# Patient Record
Sex: MALE | Race: WHITE | Employment: FULL TIME | URBAN - METROPOLITAN AREA
[De-identification: names, ages, dates, MRNs, and addresses within clinical notes are randomized per-mention and may not be internally consistent; named-entity substitution may affect disease eponyms.]

---

## 2018-03-22 ENCOUNTER — OFFICE VISIT (OUTPATIENT)
Dept: FAMILY MEDICINE CLINIC | Age: 58
End: 2018-03-22
Payer: MEDICARE

## 2018-03-22 ENCOUNTER — HOSPITAL ENCOUNTER (OUTPATIENT)
Age: 58
Discharge: HOME | End: 2018-03-24
Payer: MEDICARE

## 2018-03-22 VITALS
SYSTOLIC BLOOD PRESSURE: 160 MMHG | TEMPERATURE: 98.7 F | WEIGHT: 186.8 LBS | BODY MASS INDEX: 33.1 KG/M2 | OXYGEN SATURATION: 97 % | HEIGHT: 63 IN | DIASTOLIC BLOOD PRESSURE: 100 MMHG | HEART RATE: 68 BPM

## 2018-03-22 DIAGNOSIS — Z11.4 ENCOUNTER FOR SCREENING FOR HIV: ICD-10-CM

## 2018-03-22 DIAGNOSIS — Z11.59 NEED FOR HEPATITIS C SCREENING TEST: ICD-10-CM

## 2018-03-22 DIAGNOSIS — E66.9 CLASS 2 OBESITY IN ADULT, UNSPECIFIED BMI, UNSPECIFIED OBESITY TYPE, UNSPECIFIED WHETHER SERIOUS COMORBIDITY PRESENT: ICD-10-CM

## 2018-03-22 DIAGNOSIS — R51.9 GENERALIZED HEADACHES: ICD-10-CM

## 2018-03-22 DIAGNOSIS — I10 ESSENTIAL HYPERTENSION: Primary | ICD-10-CM

## 2018-03-22 LAB — HBA1C MFR BLD: 5.5 % (ref 4.8–5.9)

## 2018-03-22 PROCEDURE — 36415 COLL VENOUS BLD VENIPUNCTURE: CPT | Performed by: COUNSELOR

## 2018-03-22 PROCEDURE — G8482 FLU IMMUNIZE ORDER/ADMIN: HCPCS | Performed by: FAMILY MEDICINE

## 2018-03-22 PROCEDURE — G8427 DOCREV CUR MEDS BY ELIG CLIN: HCPCS | Performed by: FAMILY MEDICINE

## 2018-03-22 PROCEDURE — 99213 OFFICE O/P EST LOW 20 MIN: CPT | Performed by: FAMILY MEDICINE

## 2018-03-22 PROCEDURE — 86803 HEPATITIS C AB TEST: CPT

## 2018-03-22 PROCEDURE — 83036 HEMOGLOBIN GLYCOSYLATED A1C: CPT

## 2018-03-22 PROCEDURE — 3017F COLORECTAL CA SCREEN DOC REV: CPT | Performed by: FAMILY MEDICINE

## 2018-03-22 PROCEDURE — G8417 CALC BMI ABV UP PARAM F/U: HCPCS | Performed by: FAMILY MEDICINE

## 2018-03-22 PROCEDURE — 1036F TOBACCO NON-USER: CPT | Performed by: FAMILY MEDICINE

## 2018-03-22 PROCEDURE — 99212 OFFICE O/P EST SF 10 MIN: CPT | Performed by: FAMILY MEDICINE

## 2018-03-22 PROCEDURE — 86703 HIV-1/HIV-2 1 RESULT ANTBDY: CPT

## 2018-03-22 RX ORDER — CARVEDILOL 25 MG/1
25 TABLET ORAL 2 TIMES DAILY
Qty: 60 TABLET | Refills: 1 | Status: SHIPPED | OUTPATIENT
Start: 2018-03-22 | End: 2018-07-20 | Stop reason: SDUPTHER

## 2018-03-22 ASSESSMENT — PATIENT HEALTH QUESTIONNAIRE - PHQ9
SUM OF ALL RESPONSES TO PHQ9 QUESTIONS 1 & 2: 0
SUM OF ALL RESPONSES TO PHQ QUESTIONS 1-9: 0
2. FEELING DOWN, DEPRESSED OR HOPELESS: 0
1. LITTLE INTEREST OR PLEASURE IN DOING THINGS: 0

## 2018-03-22 ASSESSMENT — ENCOUNTER SYMPTOMS
BACK PAIN: 0
VOMITING: 0
CONSTIPATION: 0
NAUSEA: 0
SHORTNESS OF BREATH: 0
ABDOMINAL PAIN: 0
BLURRED VISION: 0
DOUBLE VISION: 0
DIARRHEA: 0
SORE THROAT: 0
COUGH: 0

## 2018-03-22 NOTE — PATIENT INSTRUCTIONS
Patient Education        DASH Diet: Care Instructions  Your Care Instructions    The DASH diet is an eating plan that can help lower your blood pressure. DASH stands for Dietary Approaches to Stop Hypertension. Hypertension is high blood pressure. The DASH diet focuses on eating foods that are high in calcium, potassium, and magnesium. These nutrients can lower blood pressure. The foods that are highest in these nutrients are fruits, vegetables, low-fat dairy products, nuts, seeds, and legumes. But taking calcium, potassium, and magnesium supplements instead of eating foods that are high in those nutrients does not have the same effect. The DASH diet also includes whole grains, fish, and poultry. The DASH diet is one of several lifestyle changes your doctor may recommend to lower your high blood pressure. Your doctor may also want you to decrease the amount of sodium in your diet. Lowering sodium while following the DASH diet can lower blood pressure even further than just the DASH diet alone. Follow-up care is a key part of your treatment and safety. Be sure to make and go to all appointments, and call your doctor if you are having problems. It's also a good idea to know your test results and keep a list of the medicines you take. How can you care for yourself at home? Following the DASH diet  · Eat 4 to 5 servings of fruit each day. A serving is 1 medium-sized piece of fruit, ½ cup chopped or canned fruit, 1/4 cup dried fruit, or 4 ounces (½ cup) of fruit juice. Choose fruit more often than fruit juice. · Eat 4 to 5 servings of vegetables each day. A serving is 1 cup of lettuce or raw leafy vegetables, ½ cup of chopped or cooked vegetables, or 4 ounces (½ cup) of vegetable juice. Choose vegetables more often than vegetable juice. · Get 2 to 3 servings of low-fat and fat-free dairy each day. A serving is 8 ounces of milk, 1 cup of yogurt, or 1 ½ ounces of cheese. · Eat 6 to 8 servings of grains each day.  A using beans and peas. Add garbanzo or kidney beans to salads. Make burritos and tacos with mashed gonzalez beans or black beans. Where can you learn more? Go to https://Tapjoylillianeb.[email or web address]. org and sign in to your [employer] account. Enter S631 in the [place] box to learn more about \"DASH Diet: Care Instructions. \"     If you do not have an account, please click on the \"Sign Up Now\" link. Current as of: September 21, 2016  Content Version: 11.5  © 2068-7344 Healthwise, Incorporated. Care instructions adapted under license by [facility] ([facility]). If you have questions about a medical condition or this instruction, always ask your healthcare professional. [address] any warranty or liability for your use of this information.

## 2018-03-22 NOTE — PROGRESS NOTES
1960    HIV screen  08/14/1976    DTaP/Tdap/Td vaccine (1 - Tdap) 08/14/1980    Shingles Vaccine (1 of 2 - 2 Dose Series) 08/14/2011    A1C test (Diabetic or Prediabetic)  08/15/2017     Reviewed age and gender appropriate health screening exams and vaccinations. Advised patient regarding importance of keeping up with recommended health maintenance and to schedule as soon as possible if overdue, as this is important in assessing for undiagnosed pathology, especially cancer, as well as protecting against potentially harmful/life threatening disease. RTO in 1 week for BP and HA's. No future appointments.          Signed by : Ailyn Joseph M.D., [place] PGY-3    This case was discussed with (s) Katharine Singleton

## 2018-03-23 LAB
HEPATITIS C ANTIBODY INTERPRETATION: NORMAL
HIV-1 AND HIV-2 ANTIBODIES: NORMAL

## 2018-03-29 ENCOUNTER — APPOINTMENT (OUTPATIENT)
Dept: GENERAL RADIOLOGY | Age: 58
End: 2018-03-29
Payer: MEDICARE

## 2018-03-29 ENCOUNTER — HOSPITAL ENCOUNTER (OUTPATIENT)
Age: 58
Discharge: HOME | End: 2018-03-31
Payer: MEDICARE

## 2018-03-29 ENCOUNTER — APPOINTMENT (OUTPATIENT)
Dept: CT IMAGING | Age: 58
End: 2018-03-29
Payer: MEDICARE

## 2018-03-29 ENCOUNTER — NURSE ONLY (OUTPATIENT)
Dept: FAMILY MEDICINE CLINIC | Age: 58
End: 2018-03-29
Payer: MEDICARE

## 2018-03-29 ENCOUNTER — HOSPITAL ENCOUNTER (EMERGENCY)
Age: 58
Discharge: HOME | End: 2018-03-29
Attending: EMERGENCY MEDICINE
Payer: MEDICARE

## 2018-03-29 VITALS
BODY MASS INDEX: 32.96 KG/M2 | RESPIRATION RATE: 19 BRPM | WEIGHT: 186 LBS | HEART RATE: 60 BPM | HEIGHT: 63 IN | SYSTOLIC BLOOD PRESSURE: 176 MMHG | TEMPERATURE: 97.3 F | DIASTOLIC BLOOD PRESSURE: 97 MMHG | OXYGEN SATURATION: 98 %

## 2018-03-29 VITALS — DIASTOLIC BLOOD PRESSURE: 106 MMHG | SYSTOLIC BLOOD PRESSURE: 168 MMHG | HEART RATE: 64 BPM

## 2018-03-29 DIAGNOSIS — I10 ESSENTIAL HYPERTENSION: ICD-10-CM

## 2018-03-29 DIAGNOSIS — I15.9 SECONDARY HYPERTENSION: Primary | ICD-10-CM

## 2018-03-29 DIAGNOSIS — R51.9 NONINTRACTABLE HEADACHE, UNSPECIFIED CHRONICITY PATTERN, UNSPECIFIED HEADACHE TYPE: ICD-10-CM

## 2018-03-29 LAB
ALBUMIN SERPL-MCNC: 4.2 G/DL (ref 3.5–5.2)
ALP BLD-CCNC: 58 U/L (ref 40–129)
ALT SERPL-CCNC: 28 U/L (ref 0–40)
ANION GAP SERPL CALCULATED.3IONS-SCNC: 12 MMOL/L (ref 7–16)
AST SERPL-CCNC: 26 U/L (ref 0–39)
BASOPHILS ABSOLUTE: 0.06 E9/L (ref 0–0.2)
BASOPHILS RELATIVE PERCENT: 0.8 % (ref 0–2)
BILIRUB SERPL-MCNC: 0.4 MG/DL (ref 0–1.2)
BILIRUBIN URINE: NEGATIVE
BLOOD, URINE: NEGATIVE
BUN BLDV-MCNC: 18 MG/DL (ref 6–20)
CALCIUM SERPL-MCNC: 9.1 MG/DL (ref 8.6–10.2)
CHLORIDE BLD-SCNC: 104 MMOL/L (ref 98–107)
CLARITY: CLEAR
CO2: 26 MMOL/L (ref 22–29)
COLOR: YELLOW
CREAT SERPL-MCNC: 0.8 MG/DL (ref 0.7–1.2)
EOSINOPHILS ABSOLUTE: 0.65 E9/L (ref 0.05–0.5)
EOSINOPHILS RELATIVE PERCENT: 8.4 % (ref 0–6)
GFR AFRICAN AMERICAN: >60
GFR NON-AFRICAN AMERICAN: >60 ML/MIN/1.73
GLUCOSE BLD-MCNC: 94 MG/DL (ref 74–109)
GLUCOSE URINE: NEGATIVE MG/DL
HCT VFR BLD CALC: 41.2 % (ref 37–54)
HEMOGLOBIN: 14 G/DL (ref 12.5–16.5)
IMMATURE GRANULOCYTES #: 0.03 E9/L
IMMATURE GRANULOCYTES %: 0.4 % (ref 0–5)
KETONES, URINE: NEGATIVE MG/DL
LEUKOCYTE ESTERASE, URINE: NEGATIVE
LYMPHOCYTES ABSOLUTE: 1.83 E9/L (ref 1.5–4)
LYMPHOCYTES RELATIVE PERCENT: 23.7 % (ref 20–42)
MCH RBC QN AUTO: 28.2 PG (ref 26–35)
MCHC RBC AUTO-ENTMCNC: 34 % (ref 32–34.5)
MCV RBC AUTO: 83.1 FL (ref 80–99.9)
MONOCYTES ABSOLUTE: 0.65 E9/L (ref 0.1–0.95)
MONOCYTES RELATIVE PERCENT: 8.4 % (ref 2–12)
NEUTROPHILS ABSOLUTE: 4.5 E9/L (ref 1.8–7.3)
NEUTROPHILS RELATIVE PERCENT: 58.3 % (ref 43–80)
NITRITE, URINE: NEGATIVE
PDW BLD-RTO: 12.6 FL (ref 11.5–15)
PH UA: 6.5 (ref 5–9)
PLATELET # BLD: 249 E9/L (ref 130–450)
PMV BLD AUTO: 10.2 FL (ref 7–12)
POTASSIUM SERPL-SCNC: 4.1 MMOL/L (ref 3.5–5)
PROTEIN UA: NEGATIVE MG/DL
RBC # BLD: 4.96 E12/L (ref 3.8–5.8)
SODIUM BLD-SCNC: 142 MMOL/L (ref 132–146)
SPECIFIC GRAVITY UA: 1.02 (ref 1–1.03)
TOTAL PROTEIN: 7.1 G/DL (ref 6.4–8.3)
TROPONIN: <0.01 NG/ML (ref 0–0.03)
UROBILINOGEN, URINE: 1 E.U./DL
WBC # BLD: 7.7 E9/L (ref 4.5–11.5)

## 2018-03-29 PROCEDURE — 6360000002 HC RX W HCPCS: Performed by: EMERGENCY MEDICINE

## 2018-03-29 PROCEDURE — 81003 URINALYSIS AUTO W/O SCOPE: CPT

## 2018-03-29 PROCEDURE — 71046 X-RAY EXAM CHEST 2 VIEWS: CPT

## 2018-03-29 PROCEDURE — 99211 OFF/OP EST MAY X REQ PHY/QHP: CPT

## 2018-03-29 PROCEDURE — 94760 N-INVAS EAR/PLS OXIMETRY 1: CPT

## 2018-03-29 PROCEDURE — 80053 COMPREHEN METABOLIC PANEL: CPT

## 2018-03-29 PROCEDURE — 96374 THER/PROPH/DIAG INJ IV PUSH: CPT

## 2018-03-29 PROCEDURE — 70450 CT HEAD/BRAIN W/O DYE: CPT

## 2018-03-29 PROCEDURE — 6370000000 HC RX 637 (ALT 250 FOR IP): Performed by: EMERGENCY MEDICINE

## 2018-03-29 PROCEDURE — 36415 COLL VENOUS BLD VENIPUNCTURE: CPT

## 2018-03-29 PROCEDURE — 84484 ASSAY OF TROPONIN QUANT: CPT

## 2018-03-29 PROCEDURE — 99284 EMERGENCY DEPT VISIT MOD MDM: CPT

## 2018-03-29 PROCEDURE — 85025 COMPLETE CBC W/AUTO DIFF WBC: CPT

## 2018-03-29 PROCEDURE — 93005 ELECTROCARDIOGRAM TRACING: CPT | Performed by: EMERGENCY MEDICINE

## 2018-03-29 RX ORDER — AMLODIPINE BESYLATE 5 MG/1
5 TABLET ORAL DAILY
Qty: 30 TABLET | Refills: 0 | Status: SHIPPED | OUTPATIENT
Start: 2018-03-29 | End: 2018-07-25 | Stop reason: SDUPTHER

## 2018-03-29 RX ORDER — ACETAMINOPHEN 325 MG/1
650 TABLET ORAL ONCE
Status: COMPLETED | OUTPATIENT
Start: 2018-03-29 | End: 2018-03-29

## 2018-03-29 RX ORDER — HYDRALAZINE HYDROCHLORIDE 20 MG/ML
10 INJECTION INTRAMUSCULAR; INTRAVENOUS ONCE
Status: COMPLETED | OUTPATIENT
Start: 2018-03-29 | End: 2018-03-29

## 2018-03-29 RX ORDER — AMLODIPINE BESYLATE 5 MG/1
5 TABLET ORAL ONCE
Status: COMPLETED | OUTPATIENT
Start: 2018-03-29 | End: 2018-03-29

## 2018-03-29 RX ADMIN — AMLODIPINE BESYLATE 5 MG: 5 TABLET ORAL at 17:46

## 2018-03-29 RX ADMIN — HYDRALAZINE HYDROCHLORIDE 10 MG: 20 INJECTION INTRAMUSCULAR; INTRAVENOUS at 15:14

## 2018-03-29 RX ADMIN — ACETAMINOPHEN 650 MG: 325 TABLET, FILM COATED ORAL at 15:11

## 2018-03-29 ASSESSMENT — PAIN SCALES - GENERAL: PAINLEVEL_OUTOF10: 8

## 2018-03-29 ASSESSMENT — PAIN DESCRIPTION - FREQUENCY: FREQUENCY: CONTINUOUS

## 2018-03-29 ASSESSMENT — PAIN DESCRIPTION - DESCRIPTORS: DESCRIPTORS: HEADACHE

## 2018-03-29 ASSESSMENT — PAIN DESCRIPTION - LOCATION: LOCATION: HEAD

## 2018-03-29 NOTE — PROGRESS NOTES
Patient here with continued worsening headaches despite addition of blood pressure meds that have not improved his BP's. He continues in 180's SBP and 's with headaches and numbness feeling bilaterally of his face. He states headaches worsening and he's never had these before. States they are 9/10 and nothing helps them. Says also issues with blurry vision, but no weakness in extremities. Advised he go to ED for CT of Head and r/o intracranial pathology. Spoke to ED attending and they are aware of situation. Spoke to rounding team to keep an eye on patient and possible admit if needed. Exam    Neuro: WNL stroke scale 0  Cardiac: RRR no murmurs  Lungs: CTAB    Hypertensive urgency - send to ED for work up r/o intracranial pathology.     Discussed with     Electronically signed by Barbie Charlton MD on 3/29/2018 at 12:45 PM

## 2018-04-02 LAB
EKG ATRIAL RATE: 60 BPM
EKG P AXIS: 17 DEGREES
EKG P-R INTERVAL: 132 MS
EKG Q-T INTERVAL: 390 MS
EKG QRS DURATION: 90 MS
EKG QTC CALCULATION (BAZETT): 390 MS
EKG R AXIS: 63 DEGREES
EKG T AXIS: 2 DEGREES
EKG VENTRICULAR RATE: 60 BPM

## 2018-07-20 ENCOUNTER — OFFICE VISIT (OUTPATIENT)
Dept: FAMILY MEDICINE CLINIC | Age: 58
End: 2018-07-20
Payer: MEDICARE

## 2018-07-20 VITALS
RESPIRATION RATE: 18 BRPM | HEART RATE: 94 BPM | BODY MASS INDEX: 31.62 KG/M2 | DIASTOLIC BLOOD PRESSURE: 108 MMHG | HEIGHT: 64 IN | TEMPERATURE: 98.4 F | WEIGHT: 185.2 LBS | OXYGEN SATURATION: 99 % | SYSTOLIC BLOOD PRESSURE: 192 MMHG

## 2018-07-20 DIAGNOSIS — M1A.9XX0 CHRONIC GOUT INVOLVING TOE OF LEFT FOOT, UNSPECIFIED CAUSE: ICD-10-CM

## 2018-07-20 DIAGNOSIS — J45.40 MODERATE PERSISTENT ASTHMA WITHOUT COMPLICATION: Primary | ICD-10-CM

## 2018-07-20 DIAGNOSIS — I10 ESSENTIAL HYPERTENSION: ICD-10-CM

## 2018-07-20 PROCEDURE — G8417 CALC BMI ABV UP PARAM F/U: HCPCS | Performed by: STUDENT IN AN ORGANIZED HEALTH CARE EDUCATION/TRAINING PROGRAM

## 2018-07-20 PROCEDURE — G8427 DOCREV CUR MEDS BY ELIG CLIN: HCPCS | Performed by: STUDENT IN AN ORGANIZED HEALTH CARE EDUCATION/TRAINING PROGRAM

## 2018-07-20 PROCEDURE — 3017F COLORECTAL CA SCREEN DOC REV: CPT | Performed by: STUDENT IN AN ORGANIZED HEALTH CARE EDUCATION/TRAINING PROGRAM

## 2018-07-20 PROCEDURE — 94640 AIRWAY INHALATION TREATMENT: CPT | Performed by: STUDENT IN AN ORGANIZED HEALTH CARE EDUCATION/TRAINING PROGRAM

## 2018-07-20 PROCEDURE — 1036F TOBACCO NON-USER: CPT | Performed by: STUDENT IN AN ORGANIZED HEALTH CARE EDUCATION/TRAINING PROGRAM

## 2018-07-20 PROCEDURE — 99213 OFFICE O/P EST LOW 20 MIN: CPT | Performed by: STUDENT IN AN ORGANIZED HEALTH CARE EDUCATION/TRAINING PROGRAM

## 2018-07-20 PROCEDURE — 6360000002 HC RX W HCPCS

## 2018-07-20 RX ORDER — ALBUTEROL SULFATE 90 UG/1
2 AEROSOL, METERED RESPIRATORY (INHALATION) EVERY 6 HOURS PRN
Qty: 1 INHALER | Refills: 2 | Status: SHIPPED | OUTPATIENT
Start: 2018-07-20 | End: 2018-09-26 | Stop reason: SDUPTHER

## 2018-07-20 RX ORDER — ALLOPURINOL 100 MG/1
200 TABLET ORAL 2 TIMES DAILY
Qty: 120 TABLET | Refills: 0 | Status: SHIPPED | OUTPATIENT
Start: 2018-07-20 | End: 2018-09-26 | Stop reason: SDUPTHER

## 2018-07-20 RX ORDER — AMLODIPINE BESYLATE 5 MG/1
5 TABLET ORAL DAILY
Qty: 30 TABLET | Refills: 1 | Status: CANCELLED | OUTPATIENT
Start: 2018-07-20

## 2018-07-20 RX ORDER — LISINOPRIL 40 MG/1
40 TABLET ORAL DAILY
Qty: 30 TABLET | Refills: 2 | Status: SHIPPED | OUTPATIENT
Start: 2018-07-20 | End: 2018-09-26 | Stop reason: SDUPTHER

## 2018-07-20 RX ORDER — ALBUTEROL SULFATE 2.5 MG/3ML
2.5 SOLUTION RESPIRATORY (INHALATION) ONCE
Status: COMPLETED | OUTPATIENT
Start: 2018-07-20 | End: 2018-07-20

## 2018-07-20 RX ORDER — CARVEDILOL 25 MG/1
25 TABLET ORAL 2 TIMES DAILY
Qty: 60 TABLET | Refills: 2 | Status: SHIPPED | OUTPATIENT
Start: 2018-07-20 | End: 2018-09-26 | Stop reason: SDUPTHER

## 2018-07-20 RX ADMIN — Medication 0.5 MG: at 14:55

## 2018-07-20 RX ADMIN — ALBUTEROL SULFATE 2.5 MG: 2.5 SOLUTION RESPIRATORY (INHALATION) at 14:38

## 2018-07-20 NOTE — PROGRESS NOTES
DATE OF VISIT : 2018    Patient : Anai Holloway   Sex : male   Age : 65 y.o.  : 1960   MRN : <[id number]>       Chief Complaint :   Chief Complaint   Patient presents with    Hypertension       Present Illness : Here for med refills. Blood pressure is elevated. Hasn't taken them in a few days. Blood pressure earlier today was 163 over something. No symptoms at this time. Denies headaches, vision changes, chest pain. Not sure about his HTN regimen except for coreg being increased to 25 and lisinopril being increased to 40mg. Doesn't remember if he takes norvasc/amlodipine per patient. SOB also due to not taking his asthma inhalers due to running out of them. No cough or sputum however. Didn't remember to contact us to refill his medications.        Past Medical History:   Diagnosis Date    Asthma     Chronic back pain     COPD (chronic obstructive pulmonary disease) (HCC)     Depression     Gout     Headache     Hypertension     Osteoarthritis        Review of Systems :  Review of Systems   General ROS: negative  Hematological and Lymphatic ROS: negative  Respiratory ROS: positive for - shortness of breath  Cardiovascular ROS: no chest pain or dyspnea on exertion  Gastrointestinal ROS: no abdominal pain, change in bowel habits, or black or bloody stools  Genito-Urinary ROS: no dysuria, trouble voiding, or hematuria  Musculoskeletal ROS: negative    Rest of ROS as per HPI     PMHx: reviewed     Physical Exam :    VITAL SIGNS :   Vitals:    18 1331 18 1333   BP: (!) 189/118 (!) 192/108   Site: Right Arm Right Arm   Position: Sitting Sitting   Cuff Size: Medium Adult Medium Adult   Pulse: 94    Resp: 18    Temp: 98.4 °F (36.9 °C)    TempSrc: Oral    SpO2: 99%    Weight: 185 lb 3.2 oz (84 kg)    Height: 5' 4\" (1.626 m)        General Appearance:  awake, alert, oriented, in no acute distress  Head/face:  NCAT  Lungs:  Breath sounds: bilaterally wheezing and rhonchi Heart: Heart sounds are normal.  Regular rate and rhythm without murmur, gallop or rub. Abdomen:  Soft, non-tender, normal bowel sounds. No bruits, organomegaly or masses. Extremities: Extremities warm to touch, pink, with no edema. Assessment & Plan :    Suze Mckeon was seen today for hypertension and asthma. Gave him a dose of duonebs to give him time to get to the pharmacy for his inhalers. Sounded better after. Instructed to continue his medications as directed. Will follow up next week due to being in hypertensive urgency and likely needing a third agent. Diagnoses and all orders for this visit:    Moderate persistent asthma without complication  -     albuterol sulfate HFA (VENTOLIN HFA) 108 (90 Base) MCG/ACT inhaler; Inhale 2 puffs into the lungs every 6 hours as needed for Wheezing  -     fluticasone propionate (FLOVENT DISKUS) 100 MCG/BLIST AEPB inhaler; Inhale 1 puff into the lungs daily  -     albuterol (PROVENTIL) nebulizer solution 2.5 mg; Take 3 mLs by nebulization once  -     ipratropium (ATROVENT) 0.02 % nebulizer solution 0.5 mg; Take 2.5 mLs by nebulization once    Essential hypertension- hypertensive urgency   -     carvedilol (COREG) 25 MG tablet; Take 1 tablet by mouth 2 times daily  -     lisinopril (PRINIVIL;ZESTRIL) 40 MG tablet; Take 1 tablet by mouth daily    Chronic gout involving toe of left foot, unspecified cause  -     allopurinol (ZYLOPRIM) 100 MG tablet; Take 2 tablets by mouth 2 times daily          Counseled regarding the possible side effects, risks, benefits and alternatives to treatment; patient and/or guardian verbalizes understanding, agrees, feels comfortable with and wishes to proceed with above treatment plan. Advised patient to call with any new medication issues, and read all Rx info from pharmacy to assure aware of all possible risks and side effects of medication before taking. Reviewed age and gender appropriate health screening exams and vaccinations.

## 2018-07-25 ENCOUNTER — OFFICE VISIT (OUTPATIENT)
Dept: FAMILY MEDICINE CLINIC | Age: 58
End: 2018-07-25
Payer: MEDICARE

## 2018-07-25 VITALS
TEMPERATURE: 98.4 F | HEIGHT: 63 IN | HEART RATE: 58 BPM | DIASTOLIC BLOOD PRESSURE: 96 MMHG | RESPIRATION RATE: 18 BRPM | WEIGHT: 185 LBS | SYSTOLIC BLOOD PRESSURE: 157 MMHG | OXYGEN SATURATION: 96 % | BODY MASS INDEX: 32.78 KG/M2

## 2018-07-25 DIAGNOSIS — I10 ESSENTIAL HYPERTENSION: Primary | ICD-10-CM

## 2018-07-25 DIAGNOSIS — J45.40 MODERATE PERSISTENT ASTHMA WITHOUT COMPLICATION: ICD-10-CM

## 2018-07-25 PROCEDURE — G8417 CALC BMI ABV UP PARAM F/U: HCPCS | Performed by: STUDENT IN AN ORGANIZED HEALTH CARE EDUCATION/TRAINING PROGRAM

## 2018-07-25 PROCEDURE — 99212 OFFICE O/P EST SF 10 MIN: CPT | Performed by: STUDENT IN AN ORGANIZED HEALTH CARE EDUCATION/TRAINING PROGRAM

## 2018-07-25 PROCEDURE — G8427 DOCREV CUR MEDS BY ELIG CLIN: HCPCS | Performed by: STUDENT IN AN ORGANIZED HEALTH CARE EDUCATION/TRAINING PROGRAM

## 2018-07-25 PROCEDURE — 99213 OFFICE O/P EST LOW 20 MIN: CPT | Performed by: STUDENT IN AN ORGANIZED HEALTH CARE EDUCATION/TRAINING PROGRAM

## 2018-07-25 PROCEDURE — 1036F TOBACCO NON-USER: CPT | Performed by: STUDENT IN AN ORGANIZED HEALTH CARE EDUCATION/TRAINING PROGRAM

## 2018-07-25 PROCEDURE — 3017F COLORECTAL CA SCREEN DOC REV: CPT | Performed by: STUDENT IN AN ORGANIZED HEALTH CARE EDUCATION/TRAINING PROGRAM

## 2018-07-25 RX ORDER — PREDNISONE 20 MG/1
20 TABLET ORAL 2 TIMES DAILY
Qty: 10 TABLET | Refills: 0 | Status: CANCELLED | OUTPATIENT
Start: 2018-07-25

## 2018-07-25 RX ORDER — AMLODIPINE BESYLATE 5 MG/1
5 TABLET ORAL DAILY
Qty: 30 TABLET | Refills: 0 | Status: SHIPPED | OUTPATIENT
Start: 2018-07-25 | End: 2018-09-26 | Stop reason: SDUPTHER

## 2018-07-25 NOTE — PROGRESS NOTES
normal.  Regular rate and rhythm without murmur, gallop or rub. Assessment & Plan :    Marilee De Anda was seen today for asthma. Diagnoses and all orders for this visit:    Essential hypertension       - restarted his bp meds at last visit, he was unsure of what he takes. Will add on norvasc today as he was previously on it and monitor his bp in a month or two. -     Discussed the risks and complications of hypertension especially if uncontrolled including heart attack and stroke. Recommended close monitoring and lifestyle modification including low sodium diet. Also discussed signs and symptoms of hypertensive urgency or emergency and advised ED immediatly via ambulance if this happens   -     amLODIPine (NORVASC) 5 MG tablet; Take 1 tablet by mouth daily    Moderate persistent asthma without complication       - called pharmacy, flovent is ready for , a mistake happened when picking up his medications, instructed patient to immediately pick this up and use, clinically breath sounds were clear so there was not a concern for an asthma exacerbation, understands symptoms and when to present to ED.   -     fluticasone propionate (FLOVENT DISKUS) 100 MCG/BLIST AEPB inhaler; Inhale 1 puff into the lungs daily      Counseled regarding the possible side effects, risks, benefits and alternatives to treatment; patient and/or guardian verbalizes understanding, agrees, feels comfortable with and wishes to proceed with above treatment plan. Advised patient to call with any new medication issues, and read all Rx info from pharmacy to assure aware of all possible risks and side effects of medication before taking. Reviewed age and gender appropriate health screening exams and vaccinations.   Advised patient regarding importance of keeping up with recommended health maintenance and to schedule as soon as possible if overdue, as this is important in assessing for undiagnosed pathology, especially cancer, as well as protecting against potentially harmful/life threatening disease. Patient and/or guardian verbalizes understanding and agrees with above counseling, assessment and plan. All questions answered. RTO in 1-2 months   ----------------------------------------------------------------  Signed by : Mei Cohen M.D.      Discussed with: Dr. Kailyn Yuan

## 2018-09-25 NOTE — PROGRESS NOTES
DATE OF VISIT : 2018    Patient : Bel Ewing   Sex : male   Age : 63 y.o.  : 1960   MRN : <[id number]>       Chief Complaint   Patient presents with    Other     two month f/u   [facility] Maintenance     declined flu vaccine today     Present Illness : Asthma:  Patient is here to address chronic asthma. This is  controlled. Current medications include flovent for control. He uses a rescue inhaler due to uncontrolled symptoms such as wheezing and shortness of breath. He is  on a controller medication. He is  using the medication as prescribed and tolerating well. Exacerbating factors include weather, and viral illness. Patient does not see a Pulmonologist.  Last PFTs done in [place] at some point and scheduling it has been difficult. He does not have a history of an asthma exacerbation requiring hospitalization and/or ED visits here in the [address]. Patient does not smoke. Hypertension: Patient here for follow-up of elevated blood pressure. He is not exercising and is not adherent to low salt diet. Blood pressure is well controlled at home. Cardiac symptoms none. Patient denies chest pain, chest pressure/discomfort, claudication, dyspnea and exertional chest pressure/discomfort. Cardiovascular risk factors: advanced age (older than 54 for men, 72 for women) and hypertension. Use of agents associated with hypertension: none. History of target organ damage: none. R knee Pain- hx of OA, states it flares up when Fall comes around. It does not feel like gout. It is not warm or swollen. Denies any fevers. States it hurts to ambulate and extend his knee. The naproxen he has been taken has not helped.      Past Medical History:   Diagnosis Date    Asthma     Chronic back pain     COPD (chronic obstructive pulmonary disease) (HCC)     Depression     Gout     Headache     Hypertension     Osteoarthritis      Review of Systems :  Review of Systems   General ROS: negative  Respiratory

## 2018-09-26 ENCOUNTER — OFFICE VISIT (OUTPATIENT)
Dept: FAMILY MEDICINE CLINIC | Age: 58
End: 2018-09-26
Payer: MEDICARE

## 2018-09-26 VITALS
TEMPERATURE: 98.2 F | DIASTOLIC BLOOD PRESSURE: 100 MMHG | HEIGHT: 63 IN | SYSTOLIC BLOOD PRESSURE: 176 MMHG | BODY MASS INDEX: 33.13 KG/M2 | OXYGEN SATURATION: 97 % | HEART RATE: 67 BPM | WEIGHT: 187 LBS

## 2018-09-26 DIAGNOSIS — M17.11 PRIMARY OSTEOARTHRITIS OF RIGHT KNEE: Primary | ICD-10-CM

## 2018-09-26 DIAGNOSIS — J45.40 MODERATE PERSISTENT ASTHMA WITHOUT COMPLICATION: ICD-10-CM

## 2018-09-26 DIAGNOSIS — I10 ESSENTIAL HYPERTENSION: ICD-10-CM

## 2018-09-26 DIAGNOSIS — M1A.9XX0 CHRONIC GOUT INVOLVING TOE OF LEFT FOOT, UNSPECIFIED CAUSE: ICD-10-CM

## 2018-09-26 PROCEDURE — 99212 OFFICE O/P EST SF 10 MIN: CPT | Performed by: STUDENT IN AN ORGANIZED HEALTH CARE EDUCATION/TRAINING PROGRAM

## 2018-09-26 PROCEDURE — 99213 OFFICE O/P EST LOW 20 MIN: CPT | Performed by: STUDENT IN AN ORGANIZED HEALTH CARE EDUCATION/TRAINING PROGRAM

## 2018-09-26 PROCEDURE — G8417 CALC BMI ABV UP PARAM F/U: HCPCS | Performed by: STUDENT IN AN ORGANIZED HEALTH CARE EDUCATION/TRAINING PROGRAM

## 2018-09-26 PROCEDURE — 3017F COLORECTAL CA SCREEN DOC REV: CPT | Performed by: STUDENT IN AN ORGANIZED HEALTH CARE EDUCATION/TRAINING PROGRAM

## 2018-09-26 PROCEDURE — 1036F TOBACCO NON-USER: CPT | Performed by: STUDENT IN AN ORGANIZED HEALTH CARE EDUCATION/TRAINING PROGRAM

## 2018-09-26 PROCEDURE — G8427 DOCREV CUR MEDS BY ELIG CLIN: HCPCS | Performed by: STUDENT IN AN ORGANIZED HEALTH CARE EDUCATION/TRAINING PROGRAM

## 2018-09-26 RX ORDER — CARVEDILOL 25 MG/1
25 TABLET ORAL 2 TIMES DAILY
Qty: 60 TABLET | Refills: 2 | Status: SHIPPED | OUTPATIENT
Start: 2018-09-26 | End: 2018-12-13 | Stop reason: SDUPTHER

## 2018-09-26 RX ORDER — LISINOPRIL 40 MG/1
40 TABLET ORAL DAILY
Qty: 30 TABLET | Refills: 2 | Status: SHIPPED | OUTPATIENT
Start: 2018-09-26 | End: 2018-12-13 | Stop reason: SDUPTHER

## 2018-09-26 RX ORDER — MELOXICAM 7.5 MG/1
7.5 TABLET ORAL DAILY
Qty: 30 TABLET | Refills: 3 | Status: SHIPPED | OUTPATIENT
Start: 2018-09-26 | End: 2019-04-09 | Stop reason: ALTCHOICE

## 2018-09-26 RX ORDER — ALBUTEROL SULFATE 90 UG/1
2 AEROSOL, METERED RESPIRATORY (INHALATION) EVERY 6 HOURS PRN
Qty: 1 INHALER | Refills: 2 | Status: SHIPPED | OUTPATIENT
Start: 2018-09-26 | End: 2018-12-13 | Stop reason: SDUPTHER

## 2018-09-26 RX ORDER — HYDROCHLOROTHIAZIDE 25 MG/1
25 TABLET ORAL DAILY
Qty: 30 TABLET | Refills: 3 | Status: SHIPPED | OUTPATIENT
Start: 2018-09-26 | End: 2018-12-13 | Stop reason: SDUPTHER

## 2018-09-26 RX ORDER — ALLOPURINOL 100 MG/1
200 TABLET ORAL 2 TIMES DAILY
Qty: 120 TABLET | Refills: 0 | Status: SHIPPED | OUTPATIENT
Start: 2018-09-26 | End: 2019-03-13 | Stop reason: SDUPTHER

## 2018-09-26 RX ORDER — AMLODIPINE BESYLATE 10 MG/1
10 TABLET ORAL DAILY
Qty: 30 TABLET | Refills: 1 | Status: SHIPPED | OUTPATIENT
Start: 2018-09-26 | End: 2018-12-13 | Stop reason: SDUPTHER

## 2018-09-26 NOTE — PROGRESS NOTES
S: 63 y.o. male presents today for   Right knee pain, history OA. Pain with season changes. On Naproxen BID. No edema, erythema. XRay 12/17 OA. BP is elevated. No CP, diaphoresis, SOB, palp, HA, visual issues. On norvasc 5 mg, Carvedilol, lisinopril/HCTZ. Asthma, well controlled. Flovent and albuterol. O: VS: BP (!) 176/100   Pulse 67   Temp 98.2 °F (36.8 °C) (Oral)   Ht 5' 3\" (1.6 m)   Wt 187 lb (84.8 kg)   SpO2 97%   BMI 33.13 kg/m²   AAO/NAD, appropriate affect for mood  CV:  RRR, no murmur  Resp: CTAB  Ext- DPP-B, no clubbing, cyanosis, edema, right knee painful extension    Impression/Plan:   1) Knee OA- stop naproxen, use caution with NSAIDs, APAP arthritis  2) HTN- increase amlodipine  3) asthma-  Stable, continue medications as prescribed. Attending Physician Statement  I have discussed the case, including pertinent history and exam findings with the resident. I agree with the documented assessment and plan.       Mireille Glass, DO

## 2018-12-10 ENCOUNTER — TELEPHONE (OUTPATIENT)
Dept: ADMINISTRATIVE | Age: 58
End: 2018-12-10

## 2018-12-13 ENCOUNTER — OFFICE VISIT (OUTPATIENT)
Dept: FAMILY MEDICINE CLINIC | Age: 58
End: 2018-12-13
Payer: MEDICARE

## 2018-12-13 ENCOUNTER — HOSPITAL ENCOUNTER (OUTPATIENT)
Age: 58
Discharge: HOME | End: 2018-12-15
Payer: MEDICARE

## 2018-12-13 VITALS
OXYGEN SATURATION: 96 % | BODY MASS INDEX: 34.02 KG/M2 | TEMPERATURE: 98.2 F | DIASTOLIC BLOOD PRESSURE: 107 MMHG | SYSTOLIC BLOOD PRESSURE: 161 MMHG | WEIGHT: 192 LBS | HEIGHT: 63 IN | HEART RATE: 62 BPM | RESPIRATION RATE: 18 BRPM

## 2018-12-13 DIAGNOSIS — I10 HYPERTENSION, UNSPECIFIED TYPE: Primary | ICD-10-CM

## 2018-12-13 DIAGNOSIS — Z76.0 MEDICATION REFILL: ICD-10-CM

## 2018-12-13 DIAGNOSIS — I10 HYPERTENSION, UNSPECIFIED TYPE: ICD-10-CM

## 2018-12-13 LAB
ALBUMIN SERPL-MCNC: 4.5 G/DL (ref 3.5–5.2)
ALP BLD-CCNC: 57 U/L (ref 40–129)
ALT SERPL-CCNC: 49 U/L (ref 0–40)
ANION GAP SERPL CALCULATED.3IONS-SCNC: 13 MMOL/L (ref 7–16)
AST SERPL-CCNC: 33 U/L (ref 0–39)
BILIRUB SERPL-MCNC: 0.4 MG/DL (ref 0–1.2)
BILIRUBIN URINE: NEGATIVE
BLOOD, URINE: NEGATIVE
BUN BLDV-MCNC: 14 MG/DL (ref 6–20)
CALCIUM SERPL-MCNC: 9.3 MG/DL (ref 8.6–10.2)
CHLORIDE BLD-SCNC: 105 MMOL/L (ref 98–107)
CLARITY: CLEAR
CO2: 22 MMOL/L (ref 22–29)
COLOR: YELLOW
CREAT SERPL-MCNC: 0.9 MG/DL (ref 0.7–1.2)
GFR AFRICAN AMERICAN: >60
GFR NON-AFRICAN AMERICAN: >60 ML/MIN/1.73
GLUCOSE BLD-MCNC: 128 MG/DL (ref 74–99)
GLUCOSE URINE: NEGATIVE MG/DL
KETONES, URINE: NEGATIVE MG/DL
LEUKOCYTE ESTERASE, URINE: NEGATIVE
NITRITE, URINE: NEGATIVE
PH UA: 5.5 (ref 5–9)
POTASSIUM SERPL-SCNC: 4.7 MMOL/L (ref 3.5–5)
PROTEIN UA: NEGATIVE MG/DL
SODIUM BLD-SCNC: 140 MMOL/L (ref 132–146)
SPECIFIC GRAVITY UA: 1.02 (ref 1–1.03)
TOTAL PROTEIN: 7.4 G/DL (ref 6.4–8.3)
TSH SERPL DL<=0.05 MIU/L-ACNC: 1.95 UIU/ML (ref 0.27–4.2)
UROBILINOGEN, URINE: 0.2 E.U./DL

## 2018-12-13 PROCEDURE — 3017F COLORECTAL CA SCREEN DOC REV: CPT | Performed by: STUDENT IN AN ORGANIZED HEALTH CARE EDUCATION/TRAINING PROGRAM

## 2018-12-13 PROCEDURE — 82088 ASSAY OF ALDOSTERONE: CPT

## 2018-12-13 PROCEDURE — 84443 ASSAY THYROID STIM HORMONE: CPT

## 2018-12-13 PROCEDURE — 80053 COMPREHEN METABOLIC PANEL: CPT

## 2018-12-13 PROCEDURE — G8417 CALC BMI ABV UP PARAM F/U: HCPCS | Performed by: STUDENT IN AN ORGANIZED HEALTH CARE EDUCATION/TRAINING PROGRAM

## 2018-12-13 PROCEDURE — 84244 ASSAY OF RENIN: CPT

## 2018-12-13 PROCEDURE — 99212 OFFICE O/P EST SF 10 MIN: CPT | Performed by: STUDENT IN AN ORGANIZED HEALTH CARE EDUCATION/TRAINING PROGRAM

## 2018-12-13 PROCEDURE — G8484 FLU IMMUNIZE NO ADMIN: HCPCS | Performed by: STUDENT IN AN ORGANIZED HEALTH CARE EDUCATION/TRAINING PROGRAM

## 2018-12-13 PROCEDURE — 36415 COLL VENOUS BLD VENIPUNCTURE: CPT | Performed by: FAMILY MEDICINE

## 2018-12-13 PROCEDURE — 36415 COLL VENOUS BLD VENIPUNCTURE: CPT

## 2018-12-13 PROCEDURE — G8427 DOCREV CUR MEDS BY ELIG CLIN: HCPCS | Performed by: STUDENT IN AN ORGANIZED HEALTH CARE EDUCATION/TRAINING PROGRAM

## 2018-12-13 PROCEDURE — 1036F TOBACCO NON-USER: CPT | Performed by: STUDENT IN AN ORGANIZED HEALTH CARE EDUCATION/TRAINING PROGRAM

## 2018-12-13 PROCEDURE — 99213 OFFICE O/P EST LOW 20 MIN: CPT | Performed by: STUDENT IN AN ORGANIZED HEALTH CARE EDUCATION/TRAINING PROGRAM

## 2018-12-13 PROCEDURE — 81003 URINALYSIS AUTO W/O SCOPE: CPT

## 2018-12-13 RX ORDER — CARVEDILOL 25 MG/1
25 TABLET ORAL 2 TIMES DAILY
Qty: 60 TABLET | Refills: 2 | Status: SHIPPED | OUTPATIENT
Start: 2018-12-13 | End: 2019-03-13 | Stop reason: SDUPTHER

## 2018-12-13 RX ORDER — LISINOPRIL 40 MG/1
40 TABLET ORAL DAILY
Qty: 30 TABLET | Refills: 2 | Status: SHIPPED | OUTPATIENT
Start: 2018-12-13 | End: 2019-03-13 | Stop reason: SDUPTHER

## 2018-12-13 RX ORDER — ALBUTEROL SULFATE 90 UG/1
2 AEROSOL, METERED RESPIRATORY (INHALATION) EVERY 6 HOURS PRN
Qty: 1 INHALER | Refills: 2 | Status: SHIPPED | OUTPATIENT
Start: 2018-12-13 | End: 2019-03-13 | Stop reason: SDUPTHER

## 2018-12-13 RX ORDER — SPIRONOLACTONE 25 MG/1
25 TABLET ORAL DAILY
Qty: 30 TABLET | Refills: 3 | Status: SHIPPED | OUTPATIENT
Start: 2018-12-13 | End: 2019-03-13 | Stop reason: SDUPTHER

## 2018-12-13 RX ORDER — AMLODIPINE BESYLATE 10 MG/1
10 TABLET ORAL DAILY
Qty: 30 TABLET | Refills: 1 | Status: SHIPPED | OUTPATIENT
Start: 2018-12-13 | End: 2019-03-13 | Stop reason: SDUPTHER

## 2018-12-13 RX ORDER — MELOXICAM 7.5 MG/1
7.5 TABLET ORAL DAILY
Qty: 30 TABLET | Refills: 3 | Status: CANCELLED | OUTPATIENT
Start: 2018-12-13

## 2018-12-13 RX ORDER — HYDROCHLOROTHIAZIDE 25 MG/1
25 TABLET ORAL DAILY
Qty: 30 TABLET | Refills: 3 | Status: SHIPPED | OUTPATIENT
Start: 2018-12-13 | End: 2019-03-13 | Stop reason: SDUPTHER

## 2018-12-15 LAB — ALDOSTERONE: 4.7 NG/DL

## 2018-12-16 LAB — RENIN ACTIVITY: 0.5 NG/ML/HR

## 2018-12-16 ASSESSMENT — ENCOUNTER SYMPTOMS: SHORTNESS OF BREATH: 0

## 2019-03-13 ENCOUNTER — OFFICE VISIT (OUTPATIENT)
Dept: FAMILY MEDICINE CLINIC | Age: 59
End: 2019-03-13
Payer: MEDICARE

## 2019-03-13 VITALS
WEIGHT: 192 LBS | OXYGEN SATURATION: 97 % | HEIGHT: 63 IN | HEART RATE: 92 BPM | SYSTOLIC BLOOD PRESSURE: 184 MMHG | BODY MASS INDEX: 34.02 KG/M2 | TEMPERATURE: 98.4 F | DIASTOLIC BLOOD PRESSURE: 113 MMHG

## 2019-03-13 DIAGNOSIS — Z76.0 MEDICATION REFILL: ICD-10-CM

## 2019-03-13 DIAGNOSIS — M10.471 ACUTE GOUT DUE TO OTHER SECONDARY CAUSE INVOLVING TOE OF RIGHT FOOT: ICD-10-CM

## 2019-03-13 PROCEDURE — G8427 DOCREV CUR MEDS BY ELIG CLIN: HCPCS | Performed by: STUDENT IN AN ORGANIZED HEALTH CARE EDUCATION/TRAINING PROGRAM

## 2019-03-13 PROCEDURE — 1036F TOBACCO NON-USER: CPT | Performed by: STUDENT IN AN ORGANIZED HEALTH CARE EDUCATION/TRAINING PROGRAM

## 2019-03-13 PROCEDURE — G8484 FLU IMMUNIZE NO ADMIN: HCPCS | Performed by: STUDENT IN AN ORGANIZED HEALTH CARE EDUCATION/TRAINING PROGRAM

## 2019-03-13 PROCEDURE — 3017F COLORECTAL CA SCREEN DOC REV: CPT | Performed by: STUDENT IN AN ORGANIZED HEALTH CARE EDUCATION/TRAINING PROGRAM

## 2019-03-13 PROCEDURE — 99213 OFFICE O/P EST LOW 20 MIN: CPT | Performed by: STUDENT IN AN ORGANIZED HEALTH CARE EDUCATION/TRAINING PROGRAM

## 2019-03-13 PROCEDURE — 99212 OFFICE O/P EST SF 10 MIN: CPT | Performed by: STUDENT IN AN ORGANIZED HEALTH CARE EDUCATION/TRAINING PROGRAM

## 2019-03-13 PROCEDURE — G8417 CALC BMI ABV UP PARAM F/U: HCPCS | Performed by: STUDENT IN AN ORGANIZED HEALTH CARE EDUCATION/TRAINING PROGRAM

## 2019-03-13 RX ORDER — AMLODIPINE BESYLATE 10 MG/1
10 TABLET ORAL DAILY
Qty: 30 TABLET | Refills: 1 | Status: SHIPPED | OUTPATIENT
Start: 2019-03-13 | End: 2019-11-04 | Stop reason: SDUPTHER

## 2019-03-13 RX ORDER — CARVEDILOL 25 MG/1
25 TABLET ORAL 2 TIMES DAILY
Qty: 60 TABLET | Refills: 2 | Status: SHIPPED | OUTPATIENT
Start: 2019-03-13 | End: 2019-08-12 | Stop reason: SDUPTHER

## 2019-03-13 RX ORDER — HYDROCHLOROTHIAZIDE 25 MG/1
25 TABLET ORAL DAILY
Qty: 30 TABLET | Refills: 3 | Status: SHIPPED | OUTPATIENT
Start: 2019-03-13 | End: 2019-09-25 | Stop reason: SDUPTHER

## 2019-03-13 RX ORDER — ALBUTEROL SULFATE 90 UG/1
2 AEROSOL, METERED RESPIRATORY (INHALATION) EVERY 6 HOURS PRN
Qty: 1 INHALER | Refills: 2 | Status: SHIPPED | OUTPATIENT
Start: 2019-03-13 | End: 2019-11-04 | Stop reason: SDUPTHER

## 2019-03-13 RX ORDER — SPIRONOLACTONE 25 MG/1
25 TABLET ORAL DAILY
Qty: 30 TABLET | Refills: 3 | Status: SHIPPED | OUTPATIENT
Start: 2019-03-13 | End: 2019-11-04 | Stop reason: SDUPTHER

## 2019-03-13 RX ORDER — NAPROXEN 375 MG/1
375 TABLET ORAL 2 TIMES DAILY WITH MEALS
Qty: 60 TABLET | Refills: 0 | Status: SHIPPED | OUTPATIENT
Start: 2019-03-13 | End: 2019-04-09 | Stop reason: ALTCHOICE

## 2019-03-13 RX ORDER — LISINOPRIL 40 MG/1
40 TABLET ORAL DAILY
Qty: 30 TABLET | Refills: 2 | Status: SHIPPED | OUTPATIENT
Start: 2019-03-13 | End: 2019-08-12 | Stop reason: SDUPTHER

## 2019-03-13 RX ORDER — ALLOPURINOL 100 MG/1
200 TABLET ORAL 2 TIMES DAILY
Qty: 120 TABLET | Refills: 0 | Status: SHIPPED | OUTPATIENT
Start: 2019-03-13 | End: 2019-04-09 | Stop reason: SDUPTHER

## 2019-03-13 ASSESSMENT — PATIENT HEALTH QUESTIONNAIRE - PHQ9
SUM OF ALL RESPONSES TO PHQ QUESTIONS 1-9: 0
2. FEELING DOWN, DEPRESSED OR HOPELESS: 0
SUM OF ALL RESPONSES TO PHQ9 QUESTIONS 1 & 2: 0
1. LITTLE INTEREST OR PLEASURE IN DOING THINGS: 0
SUM OF ALL RESPONSES TO PHQ QUESTIONS 1-9: 0

## 2019-03-19 ASSESSMENT — ENCOUNTER SYMPTOMS
BACK PAIN: 0
COLOR CHANGE: 1

## 2019-04-09 ENCOUNTER — HOSPITAL ENCOUNTER (OUTPATIENT)
Age: 59
Discharge: HOME | End: 2019-04-11
Payer: MEDICARE

## 2019-04-09 ENCOUNTER — OFFICE VISIT (OUTPATIENT)
Dept: FAMILY MEDICINE CLINIC | Age: 59
End: 2019-04-09
Payer: MEDICARE

## 2019-04-09 VITALS
WEIGHT: 191 LBS | TEMPERATURE: 98.3 F | HEART RATE: 91 BPM | OXYGEN SATURATION: 94 % | BODY MASS INDEX: 33.84 KG/M2 | HEIGHT: 63 IN | DIASTOLIC BLOOD PRESSURE: 92 MMHG | SYSTOLIC BLOOD PRESSURE: 159 MMHG

## 2019-04-09 DIAGNOSIS — M1A.0710 CHRONIC GOUT OF RIGHT FOOT, UNSPECIFIED CAUSE: ICD-10-CM

## 2019-04-09 DIAGNOSIS — J45.40 MODERATE PERSISTENT ASTHMA WITHOUT COMPLICATION: ICD-10-CM

## 2019-04-09 DIAGNOSIS — M1A.0710 CHRONIC GOUT OF RIGHT FOOT, UNSPECIFIED CAUSE: Primary | ICD-10-CM

## 2019-04-09 DIAGNOSIS — I10 ESSENTIAL HYPERTENSION: ICD-10-CM

## 2019-04-09 DIAGNOSIS — M17.11 OSTEOARTHRITIS OF RIGHT KNEE, UNSPECIFIED OSTEOARTHRITIS TYPE: ICD-10-CM

## 2019-04-09 LAB
ANION GAP SERPL CALCULATED.3IONS-SCNC: 12 MMOL/L (ref 7–16)
BUN BLDV-MCNC: 15 MG/DL (ref 6–20)
CALCIUM SERPL-MCNC: 9.6 MG/DL (ref 8.6–10.2)
CHLORIDE BLD-SCNC: 104 MMOL/L (ref 98–107)
CO2: 24 MMOL/L (ref 22–29)
CREAT SERPL-MCNC: 0.7 MG/DL (ref 0.7–1.2)
GFR AFRICAN AMERICAN: >60
GFR NON-AFRICAN AMERICAN: >60 ML/MIN/1.73
GLUCOSE BLD-MCNC: 104 MG/DL (ref 74–99)
POTASSIUM SERPL-SCNC: 4 MMOL/L (ref 3.5–5)
SODIUM BLD-SCNC: 140 MMOL/L (ref 132–146)
URIC ACID, SERUM: 6.7 MG/DL (ref 3.4–7)

## 2019-04-09 PROCEDURE — G8417 CALC BMI ABV UP PARAM F/U: HCPCS | Performed by: STUDENT IN AN ORGANIZED HEALTH CARE EDUCATION/TRAINING PROGRAM

## 2019-04-09 PROCEDURE — 1036F TOBACCO NON-USER: CPT | Performed by: STUDENT IN AN ORGANIZED HEALTH CARE EDUCATION/TRAINING PROGRAM

## 2019-04-09 PROCEDURE — 84550 ASSAY OF BLOOD/URIC ACID: CPT

## 2019-04-09 PROCEDURE — 36415 COLL VENOUS BLD VENIPUNCTURE: CPT

## 2019-04-09 PROCEDURE — 99212 OFFICE O/P EST SF 10 MIN: CPT | Performed by: STUDENT IN AN ORGANIZED HEALTH CARE EDUCATION/TRAINING PROGRAM

## 2019-04-09 PROCEDURE — 80048 BASIC METABOLIC PNL TOTAL CA: CPT

## 2019-04-09 PROCEDURE — 3017F COLORECTAL CA SCREEN DOC REV: CPT | Performed by: STUDENT IN AN ORGANIZED HEALTH CARE EDUCATION/TRAINING PROGRAM

## 2019-04-09 PROCEDURE — 36415 COLL VENOUS BLD VENIPUNCTURE: CPT | Performed by: FAMILY MEDICINE

## 2019-04-09 PROCEDURE — 99213 OFFICE O/P EST LOW 20 MIN: CPT | Performed by: STUDENT IN AN ORGANIZED HEALTH CARE EDUCATION/TRAINING PROGRAM

## 2019-04-09 PROCEDURE — G8427 DOCREV CUR MEDS BY ELIG CLIN: HCPCS | Performed by: STUDENT IN AN ORGANIZED HEALTH CARE EDUCATION/TRAINING PROGRAM

## 2019-04-09 RX ORDER — ALLOPURINOL 300 MG/1
300 TABLET ORAL 2 TIMES DAILY
Qty: 60 TABLET | Refills: 0 | Status: SHIPPED | OUTPATIENT
Start: 2019-04-09 | End: 2019-11-04 | Stop reason: SDUPTHER

## 2019-04-09 NOTE — PROGRESS NOTES
Attending Physician Statement    S:   Chief Complaint   Patient presents with    Knee Pain     right, swelling    1 Month Follow-Up      Gout improved on Naprosyn. Has chronic right knee pain   Asthma stable and controlled. BP w/u not revealing. O: Blood pressure (!) 159/92, pulse 91, temperature 98.3 °F (36.8 °C), temperature source Oral, height 5' 3\" (1.6 m), weight 191 lb (86.6 kg), SpO2 94 %. Exam:   Heart - RRR   Lungs - clear   Knee - no erythema or swelling. Diffuse tenderness, but worse over lateral patella  A: Knee pain, HTN, asthma  P:  Will refer to nephrology and obtain uric acid. Consider increasing allopurinol   Follow-up as ordered    I have discussed the case, including pertinent history and exam findings with the resident. I agree with the documented assessment and plan.
Pulmonary/Chest: Effort normal and breath sounds normal. No respiratory distress. Musculoskeletal: He exhibits tenderness (L outer knee ttp, some crepitus but full active and passive ROM, no joint line tenderness). He exhibits no edema. Nursing note and vitals reviewed. Assessment & Plan :    Bashir Mckeon was seen today for knee pain and 1 month follow-up. Diagnoses and all orders for this visit:    Chronic gout of right foot, unspecified cause  -uric acid level today still >5, will increase to 300mg BID, the R knee pain might be a tophi or gout as well but for now will retry to get voltaren gel after failing multiple NSAIDs   -     URIC ACID; Future  -     BASIC METABOLIC PANEL; Future  -     allopurinol (ZYLOPRIM) 300 MG tablet; Take 1 tablet by mouth 2 times daily    Moderate persistent asthma without complication  -stable, continue current regimen, no changes     Osteoarthritis of right knee, unspecified osteoarthritis type  -will schedule a knee injection   -     BASIC METABOLIC PANEL; Future  -     diclofenac sodium 1 % GEL; Apply 2 g topically 4 times daily    Essential hypertension  -uncontrolled, pt has had issues in the past with compliance and becoming confused so will defer starting hydralazine to nephrology and further workup is necessary, pt again denies any noncompliance   -     [facility] - Sarita Peoples MD, Nephrology, [facility] regarding the possible side effects, risks, benefits and alternatives to treatment; patient and/or guardian verbalizes understanding, agrees, feels comfortable with and wishes to proceed with above treatment plan. Advised patient to call with any new medication issues, and read all Rx info from pharmacy to assure aware of all possible risks and side effects of medication before taking. Patient and/or guardian verbalizes understanding and agrees with above counseling, assessment and plan. All questions answered.     Call or go to ED immediately if symptoms

## 2019-04-10 ASSESSMENT — ENCOUNTER SYMPTOMS
SHORTNESS OF BREATH: 0
COUGH: 0

## 2019-05-06 RX ORDER — ALBUTEROL SULFATE 90 UG/1
AEROSOL, METERED RESPIRATORY (INHALATION)
Qty: 18 G | Refills: 0 | Status: SHIPPED | OUTPATIENT
Start: 2019-05-06 | End: 2019-10-28 | Stop reason: SDUPTHER

## 2019-08-12 DIAGNOSIS — Z76.0 MEDICATION REFILL: ICD-10-CM

## 2019-08-12 RX ORDER — LISINOPRIL 40 MG/1
40 TABLET ORAL DAILY
Qty: 30 TABLET | Refills: 2 | Status: SHIPPED | OUTPATIENT
Start: 2019-08-12 | End: 2019-09-25 | Stop reason: SDUPTHER

## 2019-08-12 RX ORDER — CARVEDILOL 25 MG/1
25 TABLET ORAL 2 TIMES DAILY
Qty: 60 TABLET | Refills: 2 | Status: SHIPPED | OUTPATIENT
Start: 2019-08-12 | End: 2019-11-04 | Stop reason: SDUPTHER

## 2019-09-25 DIAGNOSIS — Z76.0 MEDICATION REFILL: ICD-10-CM

## 2019-09-25 RX ORDER — HYDROCHLOROTHIAZIDE 25 MG/1
25 TABLET ORAL DAILY
Qty: 30 TABLET | Refills: 3 | Status: SHIPPED | OUTPATIENT
Start: 2019-09-25 | End: 2019-12-03 | Stop reason: SDUPTHER

## 2019-09-25 RX ORDER — LISINOPRIL 40 MG/1
40 TABLET ORAL DAILY
Qty: 30 TABLET | Refills: 2 | Status: SHIPPED | OUTPATIENT
Start: 2019-09-25 | End: 2019-11-04 | Stop reason: SDUPTHER

## 2019-10-29 RX ORDER — ALBUTEROL SULFATE 90 UG/1
AEROSOL, METERED RESPIRATORY (INHALATION)
Qty: 18 G | Refills: 0 | Status: SHIPPED | OUTPATIENT
Start: 2019-10-29 | End: 2019-11-04 | Stop reason: SDUPTHER

## 2019-11-04 ENCOUNTER — OFFICE VISIT (OUTPATIENT)
Dept: FAMILY MEDICINE CLINIC | Age: 59
End: 2019-11-04
Payer: MEDICARE

## 2019-11-04 VITALS
BODY MASS INDEX: 33.66 KG/M2 | TEMPERATURE: 98.3 F | HEIGHT: 63 IN | OXYGEN SATURATION: 96 % | SYSTOLIC BLOOD PRESSURE: 161 MMHG | WEIGHT: 190 LBS | HEART RATE: 76 BPM | DIASTOLIC BLOOD PRESSURE: 99 MMHG

## 2019-11-04 DIAGNOSIS — M17.11 OSTEOARTHRITIS OF RIGHT KNEE, UNSPECIFIED OSTEOARTHRITIS TYPE: ICD-10-CM

## 2019-11-04 DIAGNOSIS — J45.40 MODERATE PERSISTENT ASTHMA WITHOUT COMPLICATION: ICD-10-CM

## 2019-11-04 DIAGNOSIS — Z76.0 MEDICATION REFILL: ICD-10-CM

## 2019-11-04 DIAGNOSIS — M1A.0710 CHRONIC GOUT OF RIGHT FOOT, UNSPECIFIED CAUSE: ICD-10-CM

## 2019-11-04 DIAGNOSIS — I10 ESSENTIAL HYPERTENSION: Primary | ICD-10-CM

## 2019-11-04 PROCEDURE — G8427 DOCREV CUR MEDS BY ELIG CLIN: HCPCS | Performed by: STUDENT IN AN ORGANIZED HEALTH CARE EDUCATION/TRAINING PROGRAM

## 2019-11-04 PROCEDURE — G8417 CALC BMI ABV UP PARAM F/U: HCPCS | Performed by: STUDENT IN AN ORGANIZED HEALTH CARE EDUCATION/TRAINING PROGRAM

## 2019-11-04 PROCEDURE — 99213 OFFICE O/P EST LOW 20 MIN: CPT | Performed by: STUDENT IN AN ORGANIZED HEALTH CARE EDUCATION/TRAINING PROGRAM

## 2019-11-04 PROCEDURE — 1036F TOBACCO NON-USER: CPT | Performed by: STUDENT IN AN ORGANIZED HEALTH CARE EDUCATION/TRAINING PROGRAM

## 2019-11-04 PROCEDURE — 99212 OFFICE O/P EST SF 10 MIN: CPT | Performed by: STUDENT IN AN ORGANIZED HEALTH CARE EDUCATION/TRAINING PROGRAM

## 2019-11-04 PROCEDURE — G8484 FLU IMMUNIZE NO ADMIN: HCPCS | Performed by: STUDENT IN AN ORGANIZED HEALTH CARE EDUCATION/TRAINING PROGRAM

## 2019-11-04 PROCEDURE — 3017F COLORECTAL CA SCREEN DOC REV: CPT | Performed by: STUDENT IN AN ORGANIZED HEALTH CARE EDUCATION/TRAINING PROGRAM

## 2019-11-04 RX ORDER — CARVEDILOL 25 MG/1
25 TABLET ORAL 2 TIMES DAILY
Qty: 60 TABLET | Refills: 2 | Status: SHIPPED
Start: 2019-11-04 | End: 2020-03-18 | Stop reason: SDUPTHER

## 2019-11-04 RX ORDER — ALBUTEROL SULFATE 90 UG/1
2 AEROSOL, METERED RESPIRATORY (INHALATION) EVERY 6 HOURS PRN
Qty: 1 INHALER | Refills: 2 | Status: SHIPPED
Start: 2019-11-04 | End: 2020-03-18 | Stop reason: SDUPTHER

## 2019-11-04 RX ORDER — HYDRALAZINE HYDROCHLORIDE 25 MG/1
25 TABLET, FILM COATED ORAL 3 TIMES DAILY
Qty: 90 TABLET | Refills: 3 | Status: SHIPPED
Start: 2019-11-04 | End: 2020-03-18 | Stop reason: SDUPTHER

## 2019-11-04 RX ORDER — LISINOPRIL 40 MG/1
40 TABLET ORAL DAILY
Qty: 30 TABLET | Refills: 2 | Status: SHIPPED
Start: 2019-11-04 | End: 2020-03-18 | Stop reason: SDUPTHER

## 2019-11-04 RX ORDER — ALLOPURINOL 300 MG/1
300 TABLET ORAL 2 TIMES DAILY
Qty: 60 TABLET | Refills: 0 | Status: SHIPPED
Start: 2019-11-04 | End: 2020-10-20 | Stop reason: SDUPTHER

## 2019-11-04 RX ORDER — HYDRALAZINE HYDROCHLORIDE 25 MG/1
25 TABLET, FILM COATED ORAL 3 TIMES DAILY
COMMUNITY
End: 2019-11-04 | Stop reason: ALTCHOICE

## 2019-11-04 RX ORDER — ALBUTEROL SULFATE 90 UG/1
AEROSOL, METERED RESPIRATORY (INHALATION)
Qty: 18 G | Refills: 2 | Status: SHIPPED | OUTPATIENT
Start: 2019-11-04 | End: 2019-12-03 | Stop reason: SDUPTHER

## 2019-11-04 RX ORDER — DOXAZOSIN MESYLATE 4 MG/1
4 TABLET ORAL NIGHTLY
Refills: 3 | COMMUNITY
Start: 2019-10-23 | End: 2019-12-03 | Stop reason: SDUPTHER

## 2019-11-04 RX ORDER — SPIRONOLACTONE 25 MG/1
25 TABLET ORAL DAILY
Qty: 30 TABLET | Refills: 1 | Status: SHIPPED | OUTPATIENT
Start: 2019-11-04 | End: 2019-12-03

## 2019-11-04 RX ORDER — AMLODIPINE BESYLATE 10 MG/1
10 TABLET ORAL DAILY
Qty: 30 TABLET | Refills: 1 | Status: SHIPPED
Start: 2019-11-04 | End: 2020-03-18 | Stop reason: SDUPTHER

## 2019-11-04 ASSESSMENT — ENCOUNTER SYMPTOMS
ABDOMINAL PAIN: 0
ABDOMINAL DISTENTION: 0
DIARRHEA: 0
SHORTNESS OF BREATH: 0
VOMITING: 0
COUGH: 0
NAUSEA: 0

## 2019-11-14 ENCOUNTER — OFFICE VISIT (OUTPATIENT)
Dept: FAMILY MEDICINE CLINIC | Age: 59
End: 2019-11-14
Payer: MEDICARE

## 2019-11-14 VITALS
WEIGHT: 194 LBS | SYSTOLIC BLOOD PRESSURE: 136 MMHG | HEIGHT: 63 IN | BODY MASS INDEX: 34.38 KG/M2 | DIASTOLIC BLOOD PRESSURE: 72 MMHG | OXYGEN SATURATION: 98 % | HEART RATE: 71 BPM | TEMPERATURE: 98.7 F | RESPIRATION RATE: 18 BRPM

## 2019-11-14 DIAGNOSIS — Z23 NEEDS FLU SHOT: ICD-10-CM

## 2019-11-14 DIAGNOSIS — I10 ESSENTIAL HYPERTENSION: Primary | ICD-10-CM

## 2019-11-14 PROCEDURE — 90686 IIV4 VACC NO PRSV 0.5 ML IM: CPT

## 2019-11-14 PROCEDURE — G0008 ADMIN INFLUENZA VIRUS VAC: HCPCS

## 2019-11-14 PROCEDURE — G8417 CALC BMI ABV UP PARAM F/U: HCPCS | Performed by: STUDENT IN AN ORGANIZED HEALTH CARE EDUCATION/TRAINING PROGRAM

## 2019-11-14 PROCEDURE — 99213 OFFICE O/P EST LOW 20 MIN: CPT | Performed by: STUDENT IN AN ORGANIZED HEALTH CARE EDUCATION/TRAINING PROGRAM

## 2019-11-14 PROCEDURE — G8427 DOCREV CUR MEDS BY ELIG CLIN: HCPCS | Performed by: STUDENT IN AN ORGANIZED HEALTH CARE EDUCATION/TRAINING PROGRAM

## 2019-11-14 PROCEDURE — 6360000002 HC RX W HCPCS

## 2019-11-14 PROCEDURE — 99212 OFFICE O/P EST SF 10 MIN: CPT | Performed by: STUDENT IN AN ORGANIZED HEALTH CARE EDUCATION/TRAINING PROGRAM

## 2019-11-14 PROCEDURE — 1036F TOBACCO NON-USER: CPT | Performed by: STUDENT IN AN ORGANIZED HEALTH CARE EDUCATION/TRAINING PROGRAM

## 2019-11-14 PROCEDURE — G8482 FLU IMMUNIZE ORDER/ADMIN: HCPCS | Performed by: STUDENT IN AN ORGANIZED HEALTH CARE EDUCATION/TRAINING PROGRAM

## 2019-11-14 PROCEDURE — 3017F COLORECTAL CA SCREEN DOC REV: CPT | Performed by: STUDENT IN AN ORGANIZED HEALTH CARE EDUCATION/TRAINING PROGRAM

## 2019-11-14 ASSESSMENT — ENCOUNTER SYMPTOMS
DIARRHEA: 0
ANAL BLEEDING: 0
COUGH: 0
NAUSEA: 0
VOMITING: 0
SHORTNESS OF BREATH: 0
ABDOMINAL PAIN: 0

## 2019-12-03 ENCOUNTER — HOSPITAL ENCOUNTER (OUTPATIENT)
Age: 59
Discharge: HOME | End: 2019-12-05
Payer: MEDICARE

## 2019-12-03 ENCOUNTER — OFFICE VISIT (OUTPATIENT)
Dept: FAMILY MEDICINE CLINIC | Age: 59
End: 2019-12-03
Payer: MEDICARE

## 2019-12-03 VITALS
BODY MASS INDEX: 33.84 KG/M2 | HEART RATE: 72 BPM | HEIGHT: 63 IN | OXYGEN SATURATION: 96 % | DIASTOLIC BLOOD PRESSURE: 81 MMHG | RESPIRATION RATE: 16 BRPM | SYSTOLIC BLOOD PRESSURE: 137 MMHG | TEMPERATURE: 98.7 F | WEIGHT: 191 LBS

## 2019-12-03 DIAGNOSIS — J45.40 MODERATE PERSISTENT ASTHMA WITHOUT COMPLICATION: ICD-10-CM

## 2019-12-03 DIAGNOSIS — I10 ESSENTIAL HYPERTENSION: ICD-10-CM

## 2019-12-03 DIAGNOSIS — I10 ESSENTIAL HYPERTENSION: Primary | ICD-10-CM

## 2019-12-03 DIAGNOSIS — Z76.0 MEDICATION REFILL: ICD-10-CM

## 2019-12-03 LAB
ANION GAP SERPL CALCULATED.3IONS-SCNC: 13 MMOL/L (ref 7–16)
BUN BLDV-MCNC: 17 MG/DL (ref 6–20)
CALCIUM SERPL-MCNC: 9.5 MG/DL (ref 8.6–10.2)
CHLORIDE BLD-SCNC: 105 MMOL/L (ref 98–107)
CO2: 23 MMOL/L (ref 22–29)
CREAT SERPL-MCNC: 0.7 MG/DL (ref 0.7–1.2)
GFR AFRICAN AMERICAN: >60
GFR NON-AFRICAN AMERICAN: >60 ML/MIN/1.73
GLUCOSE BLD-MCNC: 140 MG/DL (ref 74–99)
POTASSIUM SERPL-SCNC: 4.4 MMOL/L (ref 3.5–5)
SODIUM BLD-SCNC: 141 MMOL/L (ref 132–146)

## 2019-12-03 PROCEDURE — 1036F TOBACCO NON-USER: CPT | Performed by: STUDENT IN AN ORGANIZED HEALTH CARE EDUCATION/TRAINING PROGRAM

## 2019-12-03 PROCEDURE — G8482 FLU IMMUNIZE ORDER/ADMIN: HCPCS | Performed by: STUDENT IN AN ORGANIZED HEALTH CARE EDUCATION/TRAINING PROGRAM

## 2019-12-03 PROCEDURE — 99212 OFFICE O/P EST SF 10 MIN: CPT | Performed by: STUDENT IN AN ORGANIZED HEALTH CARE EDUCATION/TRAINING PROGRAM

## 2019-12-03 PROCEDURE — 36415 COLL VENOUS BLD VENIPUNCTURE: CPT | Performed by: FAMILY MEDICINE

## 2019-12-03 PROCEDURE — 36415 COLL VENOUS BLD VENIPUNCTURE: CPT

## 2019-12-03 PROCEDURE — 99213 OFFICE O/P EST LOW 20 MIN: CPT | Performed by: STUDENT IN AN ORGANIZED HEALTH CARE EDUCATION/TRAINING PROGRAM

## 2019-12-03 PROCEDURE — 80048 BASIC METABOLIC PNL TOTAL CA: CPT

## 2019-12-03 PROCEDURE — 3017F COLORECTAL CA SCREEN DOC REV: CPT | Performed by: STUDENT IN AN ORGANIZED HEALTH CARE EDUCATION/TRAINING PROGRAM

## 2019-12-03 PROCEDURE — G8417 CALC BMI ABV UP PARAM F/U: HCPCS | Performed by: STUDENT IN AN ORGANIZED HEALTH CARE EDUCATION/TRAINING PROGRAM

## 2019-12-03 PROCEDURE — G8427 DOCREV CUR MEDS BY ELIG CLIN: HCPCS | Performed by: STUDENT IN AN ORGANIZED HEALTH CARE EDUCATION/TRAINING PROGRAM

## 2019-12-03 RX ORDER — HYDROCHLOROTHIAZIDE 25 MG/1
25 TABLET ORAL DAILY
Qty: 30 TABLET | Refills: 0 | Status: SHIPPED
Start: 2019-12-03 | End: 2020-03-18 | Stop reason: SDUPTHER

## 2019-12-03 RX ORDER — ALBUTEROL SULFATE 90 UG/1
2 AEROSOL, METERED RESPIRATORY (INHALATION) EVERY 6 HOURS PRN
Qty: 1 INHALER | Refills: 2 | Status: CANCELLED | OUTPATIENT
Start: 2019-12-03

## 2019-12-03 RX ORDER — DOXAZOSIN MESYLATE 4 MG/1
4 TABLET ORAL NIGHTLY
Qty: 30 TABLET | Refills: 3 | Status: SHIPPED
Start: 2019-12-03 | End: 2020-03-18 | Stop reason: SDUPTHER

## 2019-12-03 RX ORDER — SPIRONOLACTONE 25 MG/1
25 TABLET ORAL DAILY
Qty: 30 TABLET | Refills: 1 | Status: CANCELLED | OUTPATIENT
Start: 2019-12-03

## 2019-12-03 RX ORDER — ALBUTEROL SULFATE 90 UG/1
2 AEROSOL, METERED RESPIRATORY (INHALATION) EVERY 6 HOURS PRN
Qty: 1 INHALER | Refills: 3 | Status: SHIPPED
Start: 2019-12-03 | End: 2020-04-21 | Stop reason: SDUPTHER

## 2019-12-03 ASSESSMENT — ENCOUNTER SYMPTOMS
VOMITING: 0
NAUSEA: 0
COUGH: 0
SHORTNESS OF BREATH: 0
CONSTIPATION: 0
DIARRHEA: 0
ABDOMINAL PAIN: 0
ABDOMINAL DISTENTION: 0

## 2019-12-14 ENCOUNTER — HOSPITAL ENCOUNTER (OUTPATIENT)
Dept: SLEEP CENTER | Age: 59
Discharge: HOME | End: 2019-12-14
Payer: MEDICARE

## 2019-12-14 DIAGNOSIS — J45.40 MODERATE PERSISTENT ASTHMA WITHOUT COMPLICATION: ICD-10-CM

## 2019-12-14 DIAGNOSIS — I10 ESSENTIAL HYPERTENSION: ICD-10-CM

## 2019-12-14 PROCEDURE — 95810 POLYSOM 6/> YRS 4/> PARAM: CPT

## 2019-12-15 VITALS
WEIGHT: 190 LBS | SYSTOLIC BLOOD PRESSURE: 155 MMHG | OXYGEN SATURATION: 94 % | BODY MASS INDEX: 33.66 KG/M2 | HEIGHT: 63 IN | DIASTOLIC BLOOD PRESSURE: 98 MMHG | HEART RATE: 66 BPM

## 2019-12-15 ASSESSMENT — SLEEP AND FATIGUE QUESTIONNAIRES
HOW LIKELY ARE YOU TO NOD OFF OR FALL ASLEEP IN A CAR, WHILE STOPPED FOR A FEW MINUTES IN TRAFFIC: 2
HOW LIKELY ARE YOU TO NOD OFF OR FALL ASLEEP WHILE SITTING AND READING: 1
HOW LIKELY ARE YOU TO NOD OFF OR FALL ASLEEP WHILE LYING DOWN TO REST IN THE AFTERNOON WHEN CIRCUMSTANCES PERMIT: 3
HOW LIKELY ARE YOU TO NOD OFF OR FALL ASLEEP WHILE WATCHING TV: 3
HOW LIKELY ARE YOU TO NOD OFF OR FALL ASLEEP WHILE SITTING INACTIVE IN A PUBLIC PLACE: 0
HOW LIKELY ARE YOU TO NOD OFF OR FALL ASLEEP WHILE SITTING AND TALKING TO SOMEONE: 0
ESS TOTAL SCORE: 13
HOW LIKELY ARE YOU TO NOD OFF OR FALL ASLEEP WHEN YOU ARE A PASSENGER IN A CAR FOR AN HOUR WITHOUT A BREAK: 2
HOW LIKELY ARE YOU TO NOD OFF OR FALL ASLEEP WHILE SITTING QUIETLY AFTER LUNCH WITHOUT ALCOHOL: 2

## 2019-12-17 ENCOUNTER — HOSPITAL ENCOUNTER (OUTPATIENT)
Age: 59
Discharge: HOME | End: 2019-12-19
Payer: MEDICARE

## 2019-12-17 ENCOUNTER — OFFICE VISIT (OUTPATIENT)
Dept: FAMILY MEDICINE CLINIC | Age: 59
End: 2019-12-17
Payer: MEDICARE

## 2019-12-17 VITALS
WEIGHT: 190 LBS | RESPIRATION RATE: 18 BRPM | SYSTOLIC BLOOD PRESSURE: 128 MMHG | TEMPERATURE: 98.3 F | HEIGHT: 63 IN | DIASTOLIC BLOOD PRESSURE: 73 MMHG | OXYGEN SATURATION: 95 % | HEART RATE: 65 BPM | BODY MASS INDEX: 33.66 KG/M2

## 2019-12-17 DIAGNOSIS — I10 ESSENTIAL HYPERTENSION: Primary | ICD-10-CM

## 2019-12-17 DIAGNOSIS — G47.33 OSA (OBSTRUCTIVE SLEEP APNEA): ICD-10-CM

## 2019-12-17 LAB
ANION GAP SERPL CALCULATED.3IONS-SCNC: 10 MMOL/L (ref 7–16)
BUN BLDV-MCNC: 15 MG/DL (ref 6–20)
CALCIUM SERPL-MCNC: 9.4 MG/DL (ref 8.6–10.2)
CHLORIDE BLD-SCNC: 103 MMOL/L (ref 98–107)
CO2: 25 MMOL/L (ref 22–29)
CREAT SERPL-MCNC: 0.8 MG/DL (ref 0.7–1.2)
GFR AFRICAN AMERICAN: >60
GFR NON-AFRICAN AMERICAN: >60 ML/MIN/1.73
GLUCOSE BLD-MCNC: 146 MG/DL (ref 74–99)
POTASSIUM SERPL-SCNC: 4.4 MMOL/L (ref 3.5–5)
SODIUM BLD-SCNC: 138 MMOL/L (ref 132–146)

## 2019-12-17 PROCEDURE — 99212 OFFICE O/P EST SF 10 MIN: CPT | Performed by: STUDENT IN AN ORGANIZED HEALTH CARE EDUCATION/TRAINING PROGRAM

## 2019-12-17 PROCEDURE — 1036F TOBACCO NON-USER: CPT | Performed by: FAMILY MEDICINE

## 2019-12-17 PROCEDURE — G8482 FLU IMMUNIZE ORDER/ADMIN: HCPCS | Performed by: FAMILY MEDICINE

## 2019-12-17 PROCEDURE — 36415 COLL VENOUS BLD VENIPUNCTURE: CPT

## 2019-12-17 PROCEDURE — G8417 CALC BMI ABV UP PARAM F/U: HCPCS | Performed by: FAMILY MEDICINE

## 2019-12-17 PROCEDURE — 99213 OFFICE O/P EST LOW 20 MIN: CPT | Performed by: STUDENT IN AN ORGANIZED HEALTH CARE EDUCATION/TRAINING PROGRAM

## 2019-12-17 PROCEDURE — 3017F COLORECTAL CA SCREEN DOC REV: CPT | Performed by: FAMILY MEDICINE

## 2019-12-17 PROCEDURE — 80048 BASIC METABOLIC PNL TOTAL CA: CPT

## 2019-12-17 PROCEDURE — G8427 DOCREV CUR MEDS BY ELIG CLIN: HCPCS | Performed by: FAMILY MEDICINE

## 2019-12-17 ASSESSMENT — ENCOUNTER SYMPTOMS
ABDOMINAL PAIN: 0
VOMITING: 0
NAUSEA: 0
ABDOMINAL DISTENTION: 0
COUGH: 0
CONSTIPATION: 0
STRIDOR: 0
SHORTNESS OF BREATH: 0
WHEEZING: 0
DIARRHEA: 0

## 2020-03-11 ENCOUNTER — HOSPITAL ENCOUNTER (OUTPATIENT)
Dept: SLEEP CENTER | Age: 60
Discharge: HOME | End: 2020-03-11
Payer: MEDICARE

## 2020-03-11 PROCEDURE — 95811 POLYSOM 6/>YRS CPAP 4/> PARM: CPT

## 2020-03-12 VITALS
SYSTOLIC BLOOD PRESSURE: 188 MMHG | WEIGHT: 190 LBS | HEART RATE: 56 BPM | BODY MASS INDEX: 33.66 KG/M2 | HEIGHT: 63 IN | OXYGEN SATURATION: 98 % | DIASTOLIC BLOOD PRESSURE: 101 MMHG

## 2020-03-18 ENCOUNTER — TELEPHONE (OUTPATIENT)
Dept: FAMILY MEDICINE CLINIC | Age: 60
End: 2020-03-18

## 2020-03-18 ENCOUNTER — CLINICAL DOCUMENTATION (OUTPATIENT)
Dept: FAMILY MEDICINE CLINIC | Age: 60
End: 2020-03-18

## 2020-03-18 RX ORDER — FLUTICASONE PROPIONATE 100 MCG
1 BLISTER, WITH INHALATION DEVICE INHALATION DAILY
Qty: 1 EACH | Refills: 3 | Status: SHIPPED
Start: 2020-03-18 | End: 2020-07-14

## 2020-03-18 RX ORDER — LISINOPRIL 40 MG/1
40 TABLET ORAL DAILY
Qty: 30 TABLET | Refills: 2 | Status: SHIPPED
Start: 2020-03-18 | End: 2020-10-20 | Stop reason: SDUPTHER

## 2020-03-18 RX ORDER — ALBUTEROL SULFATE 90 UG/1
2 AEROSOL, METERED RESPIRATORY (INHALATION) EVERY 6 HOURS PRN
Qty: 1 INHALER | Refills: 2 | Status: SHIPPED
Start: 2020-03-18 | End: 2020-10-20 | Stop reason: SDUPTHER

## 2020-03-18 RX ORDER — HYDROCHLOROTHIAZIDE 25 MG/1
25 TABLET ORAL DAILY
Qty: 30 TABLET | Refills: 0 | Status: SHIPPED
Start: 2020-03-18 | End: 2020-04-22

## 2020-03-18 RX ORDER — AMLODIPINE BESYLATE 10 MG/1
10 TABLET ORAL DAILY
Qty: 30 TABLET | Refills: 1 | Status: SHIPPED
Start: 2020-03-18 | End: 2020-05-19

## 2020-03-18 RX ORDER — HYDRALAZINE HYDROCHLORIDE 25 MG/1
25 TABLET, FILM COATED ORAL 3 TIMES DAILY
Qty: 90 TABLET | Refills: 3 | Status: SHIPPED
Start: 2020-03-18 | End: 2020-07-14

## 2020-03-18 RX ORDER — DOXAZOSIN MESYLATE 4 MG/1
4 TABLET ORAL NIGHTLY
Qty: 30 TABLET | Refills: 3 | Status: SHIPPED
Start: 2020-03-18 | End: 2020-07-14

## 2020-03-18 RX ORDER — CARVEDILOL 25 MG/1
25 TABLET ORAL 2 TIMES DAILY
Qty: 60 TABLET | Refills: 2 | Status: SHIPPED
Start: 2020-03-18 | End: 2020-06-15

## 2020-03-18 NOTE — PROGRESS NOTES
Reviewed/discussed plan w/ Resident.      Electronically signed by Edgardo Bettencourt MD on 3/18/2020 at 2:14 PM

## 2020-04-21 RX ORDER — ALBUTEROL SULFATE 90 UG/1
2 AEROSOL, METERED RESPIRATORY (INHALATION) EVERY 6 HOURS PRN
Qty: 1 INHALER | Refills: 3 | Status: SHIPPED
Start: 2020-04-21 | End: 2020-10-08

## 2020-04-22 RX ORDER — HYDROCHLOROTHIAZIDE 25 MG/1
TABLET ORAL
Qty: 30 TABLET | Refills: 0 | Status: SHIPPED
Start: 2020-04-22 | End: 2020-05-19

## 2020-05-19 RX ORDER — AMLODIPINE BESYLATE 10 MG/1
TABLET ORAL
Qty: 30 TABLET | Refills: 2 | Status: SHIPPED
Start: 2020-05-19 | End: 2020-07-16

## 2020-05-19 RX ORDER — HYDROCHLOROTHIAZIDE 25 MG/1
TABLET ORAL
Qty: 30 TABLET | Refills: 3 | Status: SHIPPED
Start: 2020-05-19 | End: 2020-09-10

## 2020-06-15 RX ORDER — CARVEDILOL 25 MG/1
TABLET ORAL
Qty: 60 TABLET | Refills: 2 | Status: SHIPPED
Start: 2020-06-15 | End: 2020-09-10

## 2020-08-12 RX ORDER — HYDRALAZINE HYDROCHLORIDE 25 MG/1
TABLET, FILM COATED ORAL
Qty: 90 TABLET | Refills: 0 | Status: SHIPPED
Start: 2020-08-12 | End: 2020-09-08

## 2020-08-12 RX ORDER — DOXAZOSIN MESYLATE 4 MG/1
TABLET ORAL
Qty: 30 TABLET | Refills: 0 | Status: SHIPPED
Start: 2020-08-12 | End: 2020-09-08

## 2020-09-08 RX ORDER — DOXAZOSIN MESYLATE 4 MG/1
TABLET ORAL
Qty: 30 TABLET | Refills: 0 | Status: SHIPPED
Start: 2020-09-08 | End: 2020-10-05

## 2020-09-08 RX ORDER — HYDRALAZINE HYDROCHLORIDE 25 MG/1
TABLET, FILM COATED ORAL
Qty: 90 TABLET | Refills: 0 | Status: SHIPPED
Start: 2020-09-08 | End: 2020-10-05

## 2020-09-10 RX ORDER — HYDROCHLOROTHIAZIDE 25 MG/1
TABLET ORAL
Qty: 30 TABLET | Refills: 0 | Status: SHIPPED
Start: 2020-09-10 | End: 2020-10-20 | Stop reason: SDUPTHER

## 2020-09-10 RX ORDER — CARVEDILOL 25 MG/1
TABLET ORAL
Qty: 60 TABLET | Refills: 0 | Status: SHIPPED
Start: 2020-09-10 | End: 2020-10-20 | Stop reason: SDUPTHER

## 2020-09-11 RX ORDER — FLUTICASONE PROPIONATE 100 MCG
BLISTER, WITH INHALATION DEVICE INHALATION
Qty: 60 EACH | Refills: 0 | Status: SHIPPED
Start: 2020-09-11 | End: 2020-10-20 | Stop reason: SDUPTHER

## 2020-09-11 NOTE — TELEPHONE ENCOUNTER
Last Appointment:  Visit date not found  Future Appointments   Date Time Provider Oma Vizcaino   10/20/2020  9:00 AM MD Kylie Conway [facility]

## 2020-10-08 RX ORDER — ALBUTEROL SULFATE 90 UG/1
2 AEROSOL, METERED RESPIRATORY (INHALATION) EVERY 6 HOURS PRN
Qty: 18 G | Refills: 3 | Status: SHIPPED
Start: 2020-10-08 | End: 2021-01-19 | Stop reason: ALTCHOICE

## 2020-10-20 ENCOUNTER — OFFICE VISIT (OUTPATIENT)
Dept: FAMILY MEDICINE CLINIC | Age: 60
End: 2020-10-20
Payer: MEDICARE

## 2020-10-20 VITALS
DIASTOLIC BLOOD PRESSURE: 95 MMHG | WEIGHT: 187 LBS | SYSTOLIC BLOOD PRESSURE: 155 MMHG | HEIGHT: 63 IN | HEART RATE: 81 BPM | OXYGEN SATURATION: 97 % | BODY MASS INDEX: 33.13 KG/M2 | TEMPERATURE: 97.5 F

## 2020-10-20 PROBLEM — I1A.0 RESISTANT HYPERTENSION: Status: ACTIVE | Noted: 2020-10-20

## 2020-10-20 PROBLEM — I10 RESISTANT HYPERTENSION: Status: ACTIVE | Noted: 2020-10-20

## 2020-10-20 PROBLEM — G47.33 OBSTRUCTIVE SLEEP APNEA SYNDROME: Status: ACTIVE | Noted: 2020-10-20

## 2020-10-20 PROCEDURE — G8417 CALC BMI ABV UP PARAM F/U: HCPCS | Performed by: FAMILY MEDICINE

## 2020-10-20 PROCEDURE — 99212 OFFICE O/P EST SF 10 MIN: CPT | Performed by: FAMILY MEDICINE

## 2020-10-20 PROCEDURE — G8427 DOCREV CUR MEDS BY ELIG CLIN: HCPCS | Performed by: FAMILY MEDICINE

## 2020-10-20 PROCEDURE — 90686 IIV4 VACC NO PRSV 0.5 ML IM: CPT

## 2020-10-20 PROCEDURE — G0008 ADMIN INFLUENZA VIRUS VAC: HCPCS

## 2020-10-20 PROCEDURE — 90715 TDAP VACCINE 7 YRS/> IM: CPT

## 2020-10-20 PROCEDURE — 99213 OFFICE O/P EST LOW 20 MIN: CPT | Performed by: FAMILY MEDICINE

## 2020-10-20 PROCEDURE — 3017F COLORECTAL CA SCREEN DOC REV: CPT | Performed by: FAMILY MEDICINE

## 2020-10-20 PROCEDURE — 1036F TOBACCO NON-USER: CPT | Performed by: FAMILY MEDICINE

## 2020-10-20 PROCEDURE — 6360000002 HC RX W HCPCS

## 2020-10-20 PROCEDURE — 90471 IMMUNIZATION ADMIN: CPT

## 2020-10-20 PROCEDURE — G8482 FLU IMMUNIZE ORDER/ADMIN: HCPCS | Performed by: FAMILY MEDICINE

## 2020-10-20 RX ORDER — LISINOPRIL 40 MG/1
40 TABLET ORAL DAILY
Qty: 30 TABLET | Refills: 2 | Status: SHIPPED
Start: 2020-10-20 | End: 2021-01-11

## 2020-10-20 RX ORDER — CARVEDILOL 25 MG/1
TABLET ORAL
Qty: 60 TABLET | Refills: 3 | Status: SHIPPED
Start: 2020-10-20 | End: 2021-02-01 | Stop reason: SDUPTHER

## 2020-10-20 RX ORDER — ALLOPURINOL 300 MG/1
300 TABLET ORAL 2 TIMES DAILY
Qty: 60 TABLET | Refills: 0 | Status: SHIPPED
Start: 2020-10-20 | End: 2020-11-16

## 2020-10-20 RX ORDER — HYDRALAZINE HYDROCHLORIDE 25 MG/1
TABLET, FILM COATED ORAL
Qty: 90 TABLET | Refills: 3 | Status: SHIPPED
Start: 2020-10-20 | End: 2021-02-01 | Stop reason: SDUPTHER

## 2020-10-20 RX ORDER — FLUTICASONE PROPIONATE 100 MCG
BLISTER, WITH INHALATION DEVICE INHALATION
Qty: 60 EACH | Refills: 3 | Status: SHIPPED
Start: 2020-10-20 | End: 2021-02-11

## 2020-10-20 RX ORDER — AMLODIPINE BESYLATE 10 MG/1
TABLET ORAL
Qty: 30 TABLET | Refills: 3 | Status: SHIPPED
Start: 2020-10-20 | End: 2021-02-11

## 2020-10-20 RX ORDER — DOXAZOSIN MESYLATE 4 MG/1
TABLET ORAL
Qty: 30 TABLET | Refills: 3 | Status: SHIPPED
Start: 2020-10-20 | End: 2021-02-22

## 2020-10-20 RX ORDER — ALBUTEROL SULFATE 90 UG/1
2 AEROSOL, METERED RESPIRATORY (INHALATION) EVERY 6 HOURS PRN
Qty: 1 INHALER | Refills: 2 | Status: SHIPPED
Start: 2020-10-20 | End: 2021-01-04

## 2020-10-20 RX ORDER — HYDROCHLOROTHIAZIDE 25 MG/1
TABLET ORAL
Qty: 30 TABLET | Refills: 3 | Status: SHIPPED
Start: 2020-10-20 | End: 2021-02-11

## 2020-10-20 ASSESSMENT — ENCOUNTER SYMPTOMS
DIARRHEA: 0
SHORTNESS OF BREATH: 0
NAUSEA: 0
CONSTIPATION: 0
WHEEZING: 0
ABDOMINAL PAIN: 0
COUGH: 0

## 2020-10-20 NOTE — PROGRESS NOTES
Attending Physician Statement    S:   Chief Complaint   Patient presents with    Check-Up    Hypertension     F/U      Patient is a 62year old male with COPD, shilo, htn . Hypertension - not controlled. Frequently in 040 systolic. Takes coreg, cardura, hydralazine, hctz, amlodipine, lisinopril. Only takes hydralazine and coreg once a day. Was seeing Dr. Stalin Arteaga. Had sleep study that showed sleep apnea. Had titration. Was never able to get CPAP machine. Has asthma . Uses flovent when he has it. Uses albuterol daily but does not always need to use it. O: Blood pressure (!) 155/95, pulse 81, temperature 97.5 °F (36.4 °C), temperature source Temporal, height 5' 3\" (1.6 m), weight 187 lb (84.8 kg), SpO2 97 %. Exam:   Heart - RRR   Lungs - clear   Ext- no peripheral edema   A: htn, shilo . Asthma   P:  Work on getting CPAP    Educated on appropriate blood pressure meds use   Continue current meds and titrate as needed once cpap has been ordered    [employer]    Follow-up as ordered    Attending Attestation   I have discussed the case, including pertinent history and exam findings with the resident. I agree with the documented assessment and plan.
allopurinol (ZYLOPRIM) 300 MG tablet; Take 1 tablet by mouth 2 times daily  Dispense: 60 tablet; Refill: 0    4. Obstructive sleep apnea syndrome  -Patient has sleep study done as well as CPAP titration exam.  -Was told to wait for his machine to be sent to his house but never received it.  -Per patient start pulmonary rehab tried to contact patient multiple times in order to explain how to use a CPAP machine that would be delivered but did not get a hold of him therefore machine was not delivered.  -Case discussed with the pulmonary rehab sleep nurse who will set the patient up with a CPAP machine. Additional plan and future considerations:   RTO in 1 month for BP follow-up and asthma follow-up    Return to Office: Return in about 4 weeks (around 11/17/2020) for BP and asthma.     Donta Jalloh MD   Case discussed with Dr. Rodrick Perez

## 2020-11-16 RX ORDER — ALLOPURINOL 300 MG/1
TABLET ORAL
Qty: 60 TABLET | Refills: 3 | Status: SHIPPED
Start: 2020-11-16 | End: 2021-03-01 | Stop reason: SDUPTHER

## 2020-11-16 NOTE — TELEPHONE ENCOUNTER
Last Appointment:  10/20/2020  Future Appointments   Date Time Provider Oma Vizcaino   11/25/2020  1:40 PM MD Sandra Fry [facility]

## 2021-01-04 RX ORDER — ALBUTEROL SULFATE 90 UG/1
2 AEROSOL, METERED RESPIRATORY (INHALATION) EVERY 6 HOURS PRN
Qty: 18 G | Refills: 2 | Status: SHIPPED
Start: 2021-01-04 | End: 2021-03-01 | Stop reason: SDUPTHER

## 2021-01-11 RX ORDER — LISINOPRIL 40 MG/1
40 TABLET ORAL DAILY
Qty: 30 TABLET | Refills: 2 | Status: SHIPPED
Start: 2021-01-11 | End: 2021-03-01 | Stop reason: SDUPTHER

## 2021-01-29 NOTE — PROGRESS NOTES
[facility]  FAMILY MEDICINE RESIDENCY PROGRAM  DATE OF VISIT : 2021    Patient : Landen Laureano   Age : 62 y.o.  : 1960   MRN : [id number]   ______________________________________________________________________    Chief Complaint :   Chief Complaint   Patient presents with    Hypertension     elevated bp today        HPI : Landen Laureano is 62 y.o. male who presented to the clinic today for office visit. HTN: on [id number] TID, Cardura 4mg Daily, HCTZ 25mg, Coreg 25mg, lisinopril 40mg and [id number]. Denies CP, SOB, palpitations, headache or dizziness. I reviewed the patient's past medications, allergies and past medical history during this visit.     Past Medical History :        Past Medical History:   Diagnosis Date    Asthma     Chronic back pain     COPD (chronic obstructive pulmonary disease) (HCC)     Depression     Gout     Headache     Hypertension     Osteoarthritis      Past Surgical History:   Procedure Laterality Date    COLONOSCOPY      [place] for BRBPR    COLONOSCOPY  2017       Social History :  Social History     Tobacco History     Smoking Status  Never Smoker    Smokeless Tobacco Use  Never Used          Alcohol History     Alcohol Use Status  No          Drug Use     Drug Use Status  No          Sexual Activity     Sexually Active  Yes                 Allergies :   No Known Allergies    Medication List :    Current Outpatient Medications   Medication Sig Dispense Refill    hydrALAZINE (APRESOLINE) 25 MG tablet Take 1 tablet by mouth 3 times daily TAKE 1 TABLET BY MOUTH THREE TIMES DAILY 150 tablet 3    carvedilol (COREG) 25 MG tablet TAKE 1 TABLET BY MOUTH TWICE DAILY 60 tablet 3    lisinopril (PRINIVIL;ZESTRIL) 40 MG tablet TAKE 1 TABLET BY MOUTH DAILY 30 tablet 2    albuterol sulfate  (90 Base) MCG/ACT inhaler INHALE 2 PUFFS INTO THE LUNGS EVERY 6 HOURS AS NEEDED FOR WHEEZING 18 g 2  allopurinol (ZYLOPRIM) 300 MG tablet TAKE 1 TABLET BY MOUTH TWICE DAILY 60 tablet 3    hydroCHLOROthiazide (HYDRODIURIL) 25 MG tablet TAKE 1 TABLET BY MOUTH EVERY DAY 30 tablet 3    amLODIPine (NORVASC) 10 MG tablet TAKE 1 TABLET BY MOUTH EVERY DAY 30 tablet 3    fluticasone propionate (FLOVENT DISKUS) 100 MCG/BLIST AEPB inhaler INHALE 1 PUFF BY MOUTH INTO THE LUNGS EVERY DAY 60 each 3    doxazosin (CARDURA) 4 MG tablet TAKE 1 TABLET BY MOUTH EVERY DAY 30 tablet 3    Magic Mouthwash (MIRACLE MOUTHWASH) Swish and spit 5 mLs 4 times daily as needed for Irritation 240 mL 0     No current facility-administered medications for this visit. Review of Systems :  Review of Systems   Constitutional: Negative for chills, diaphoresis and fever. Respiratory: Negative for cough, shortness of breath and wheezing. Cardiovascular: Negative for chest pain, palpitations and leg swelling. Gastrointestinal: Negative for abdominal pain, nausea and vomiting. Neurological: Negative for dizziness, weakness, light-headedness, numbness and headaches.     ______________________________________________________________________    Physical Exam :    Vitals: BP (!) 142/82   Pulse 83   Temp 98.8 °F (37.1 °C) (Temporal)   Resp 16   Ht 5' 3\" (1.6 m)   Wt 198 lb (89.8 kg)   SpO2 98%   BMI 35.07 kg/m²   Physical Exam  Constitutional:       Appearance: Normal appearance. Cardiovascular:      Rate and Rhythm: Normal rate and regular rhythm. Heart sounds: Normal heart sounds. No murmur. Pulmonary:      Effort: Pulmonary effort is normal. No respiratory distress. Breath sounds: Normal breath sounds. No wheezing. Abdominal:      General: Bowel sounds are normal. There is no distension. Palpations: Abdomen is soft. Tenderness: There is no abdominal tenderness. Musculoskeletal:      Right lower leg: No edema. Left lower leg: No edema. Neurological:      Mental Status: He is alert. ___________________    Assessment & Plan :    1. Resistant hypertension  - Increase Coreg to 25mg BID as originally prescribed  - COMPREHENSIVE METABOLIC PANEL; Future  - LIPID PANEL; Future  - carvedilol (COREG) 25 MG tablet; TAKE 1 TABLET BY MOUTH TWICE DAILY  Dispense: 60 tablet; Refill: 3  - hydrALAZINE (APRESOLINE) 25 MG tablet; Take 1 tablet by mouth 3 times daily TAKE 1 TABLET BY MOUTH THREE TIMES DAILY  Dispense: 150 tablet; Refill: 3        Additional plan and future considerations:   RTO in 1mos for HTN FU    Return to Office: Return in about 4 weeks (around 3/1/2021) for HTN FU.     Venkatesh Cardenas MD   Case discussed with Dr. Fei Goldstein

## 2021-02-01 ENCOUNTER — OFFICE VISIT (OUTPATIENT)
Dept: FAMILY MEDICINE CLINIC | Age: 61
End: 2021-02-01
Payer: MEDICARE

## 2021-02-01 VITALS
RESPIRATION RATE: 16 BRPM | HEART RATE: 83 BPM | TEMPERATURE: 98.8 F | DIASTOLIC BLOOD PRESSURE: 82 MMHG | BODY MASS INDEX: 35.08 KG/M2 | SYSTOLIC BLOOD PRESSURE: 142 MMHG | OXYGEN SATURATION: 98 % | WEIGHT: 198 LBS | HEIGHT: 63 IN

## 2021-02-01 DIAGNOSIS — I10 ESSENTIAL HYPERTENSION: ICD-10-CM

## 2021-02-01 DIAGNOSIS — I10 RESISTANT HYPERTENSION: Primary | ICD-10-CM

## 2021-02-01 DIAGNOSIS — I1A.0 RESISTANT HYPERTENSION: ICD-10-CM

## 2021-02-01 PROCEDURE — 1036F TOBACCO NON-USER: CPT | Performed by: FAMILY MEDICINE

## 2021-02-01 PROCEDURE — G8482 FLU IMMUNIZE ORDER/ADMIN: HCPCS | Performed by: FAMILY MEDICINE

## 2021-02-01 PROCEDURE — 3017F COLORECTAL CA SCREEN DOC REV: CPT | Performed by: FAMILY MEDICINE

## 2021-02-01 PROCEDURE — 99212 OFFICE O/P EST SF 10 MIN: CPT | Performed by: FAMILY MEDICINE

## 2021-02-01 PROCEDURE — 99213 OFFICE O/P EST LOW 20 MIN: CPT | Performed by: FAMILY MEDICINE

## 2021-02-01 PROCEDURE — G8427 DOCREV CUR MEDS BY ELIG CLIN: HCPCS | Performed by: FAMILY MEDICINE

## 2021-02-01 PROCEDURE — G8417 CALC BMI ABV UP PARAM F/U: HCPCS | Performed by: FAMILY MEDICINE

## 2021-02-01 RX ORDER — HYDRALAZINE HYDROCHLORIDE 10 MG/1
30 TABLET, FILM COATED ORAL 3 TIMES DAILY
Qty: 270 TABLET | Refills: 3 | Status: SHIPPED
Start: 2021-02-01 | End: 2021-02-01

## 2021-02-01 RX ORDER — CARVEDILOL 25 MG/1
TABLET ORAL
Qty: 60 TABLET | Refills: 3 | Status: SHIPPED
Start: 2021-02-01 | End: 2021-03-01 | Stop reason: SDUPTHER

## 2021-02-01 RX ORDER — HYDRALAZINE HYDROCHLORIDE 25 MG/1
25 TABLET, FILM COATED ORAL 3 TIMES DAILY
Qty: 150 TABLET | Refills: 3 | Status: SHIPPED
Start: 2021-02-01 | End: 2021-02-22

## 2021-02-01 ASSESSMENT — PATIENT HEALTH QUESTIONNAIRE - PHQ9
2. FEELING DOWN, DEPRESSED OR HOPELESS: 0
SUM OF ALL RESPONSES TO PHQ QUESTIONS 1-9: 0

## 2021-02-01 NOTE — PROGRESS NOTES
S: 62 y.o. male presents today for Hypertension (elevated bp today )    HTN f/u: on lisinopril and coreg (only taking this daily) and cardura, norvasc, HCTZ, hydralazine; follows with nephro as well    KAUSHIK: on cpap; BP improved with cpap usage    O: VS: BP (!) 142/82   Pulse 83   Temp 98.8 °F (37.1 °C) (Temporal)   Resp 16   Ht 5' 3\" (1.6 m)   Wt 198 lb (89.8 kg)   SpO2 98%   BMI 35.07 kg/m²   AAO/NAD, appropriate affect for mood  CV:  RRR, no murmur  Resp: CTAB  Abdomen: SNTND  Ext: no edema    Assessment/Plan:   1) HTN - coreg 25mg BID  2) KAUSHIK - continue CPAP  3) HM - labs as ordered  RTO: 4 weeks BP check    Attending Physician Statement  I have discussed the case, including pertinent history and exam findings with the resident. I agree with the documented assessment and plan.       Electronically signed by Angela Yu MD on 2/2/2021 at 5:21 PM

## 2021-02-02 LAB
ALBUMIN SERPL-MCNC: 4.4 G/DL (ref 3.5–5.2)
ALP BLD-CCNC: 58 U/L (ref 40–129)
ALT SERPL-CCNC: 36 U/L (ref 0–40)
ANION GAP SERPL CALCULATED.3IONS-SCNC: 16 MMOL/L (ref 7–16)
AST SERPL-CCNC: 27 U/L (ref 0–39)
BILIRUB SERPL-MCNC: 0.3 MG/DL (ref 0–1.2)
BUN BLDV-MCNC: 14 MG/DL (ref 6–20)
CALCIUM SERPL-MCNC: 9.3 MG/DL (ref 8.6–10.2)
CHLORIDE BLD-SCNC: 102 MMOL/L (ref 98–107)
CHOLESTEROL, TOTAL: 148 MG/DL (ref 0–199)
CO2: 21 MMOL/L (ref 22–29)
CREAT SERPL-MCNC: 0.7 MG/DL (ref 0.7–1.2)
GFR AFRICAN AMERICAN: >60
GFR NON-AFRICAN AMERICAN: >60 ML/MIN/1.73
GLUCOSE BLD-MCNC: 188 MG/DL (ref 74–99)
HDLC SERPL-MCNC: 34 MG/DL
LDL CHOLESTEROL CALCULATED: 38 MG/DL (ref 0–99)
POTASSIUM SERPL-SCNC: 4.3 MMOL/L (ref 3.5–5)
SODIUM BLD-SCNC: 139 MMOL/L (ref 132–146)
TOTAL PROTEIN: 7.3 G/DL (ref 6.4–8.3)
TRIGL SERPL-MCNC: 382 MG/DL (ref 0–149)
VLDLC SERPL CALC-MCNC: 76 MG/DL

## 2021-02-02 ASSESSMENT — ENCOUNTER SYMPTOMS
SHORTNESS OF BREATH: 0
VOMITING: 0
COUGH: 0
WHEEZING: 0
NAUSEA: 0
ABDOMINAL PAIN: 0

## 2021-02-09 NOTE — TELEPHONE ENCOUNTER
Last Appointment:  2/1/2021  Future Appointments   Date Time Provider Oma Vizcaino   3/1/2021 10:40 AM Tom Sour, MD [facility]

## 2021-02-11 RX ORDER — HYDROCHLOROTHIAZIDE 25 MG/1
TABLET ORAL
Qty: 30 TABLET | Refills: 3 | Status: SHIPPED
Start: 2021-02-11 | End: 2021-03-01 | Stop reason: SDUPTHER

## 2021-02-11 RX ORDER — AMLODIPINE BESYLATE 10 MG/1
TABLET ORAL
Qty: 30 TABLET | Refills: 3 | Status: SHIPPED
Start: 2021-02-11 | End: 2021-03-01 | Stop reason: SDUPTHER

## 2021-02-11 RX ORDER — FLUTICASONE PROPIONATE 100 MCG
BLISTER, WITH INHALATION DEVICE INHALATION
Qty: 60 EACH | Refills: 3 | Status: SHIPPED
Start: 2021-02-11 | End: 2021-03-01 | Stop reason: SDUPTHER

## 2021-02-11 NOTE — TELEPHONE ENCOUNTER
Last Appointment:  2/1/2021  Future Appointments   Date Time Provider Oma Vizcaino   3/1/2021 10:40 AM Reji Reyes MD [facility]

## 2021-02-21 DIAGNOSIS — I10 RESISTANT HYPERTENSION: ICD-10-CM

## 2021-02-22 DIAGNOSIS — I10 ESSENTIAL HYPERTENSION: ICD-10-CM

## 2021-02-22 RX ORDER — HYDRALAZINE HYDROCHLORIDE 25 MG/1
TABLET, FILM COATED ORAL
Qty: 90 TABLET | Refills: 3 | Status: SHIPPED
Start: 2021-02-22 | End: 2021-03-01 | Stop reason: SDUPTHER

## 2021-02-22 RX ORDER — DOXAZOSIN MESYLATE 4 MG/1
TABLET ORAL
Qty: 30 TABLET | Refills: 3 | Status: SHIPPED
Start: 2021-02-22 | End: 2021-03-01 | Stop reason: SDUPTHER

## 2021-03-01 ENCOUNTER — OFFICE VISIT (OUTPATIENT)
Dept: FAMILY MEDICINE CLINIC | Age: 61
End: 2021-03-01
Payer: MEDICARE

## 2021-03-01 VITALS
RESPIRATION RATE: 16 BRPM | DIASTOLIC BLOOD PRESSURE: 76 MMHG | WEIGHT: 191 LBS | HEIGHT: 63 IN | TEMPERATURE: 98.9 F | SYSTOLIC BLOOD PRESSURE: 132 MMHG | HEART RATE: 72 BPM | OXYGEN SATURATION: 97 % | BODY MASS INDEX: 33.84 KG/M2

## 2021-03-01 DIAGNOSIS — I10 RESISTANT HYPERTENSION: Primary | ICD-10-CM

## 2021-03-01 DIAGNOSIS — R73.03 PREDIABETES: ICD-10-CM

## 2021-03-01 DIAGNOSIS — J45.40 MODERATE PERSISTENT ASTHMA WITHOUT COMPLICATION: ICD-10-CM

## 2021-03-01 DIAGNOSIS — M1A.0710 CHRONIC GOUT OF RIGHT FOOT, UNSPECIFIED CAUSE: ICD-10-CM

## 2021-03-01 LAB — HBA1C MFR BLD: 603 %

## 2021-03-01 PROCEDURE — 99213 OFFICE O/P EST LOW 20 MIN: CPT | Performed by: FAMILY MEDICINE

## 2021-03-01 PROCEDURE — 83036 HEMOGLOBIN GLYCOSYLATED A1C: CPT | Performed by: FAMILY MEDICINE

## 2021-03-01 PROCEDURE — 1036F TOBACCO NON-USER: CPT | Performed by: FAMILY MEDICINE

## 2021-03-01 PROCEDURE — G8427 DOCREV CUR MEDS BY ELIG CLIN: HCPCS | Performed by: FAMILY MEDICINE

## 2021-03-01 PROCEDURE — G8482 FLU IMMUNIZE ORDER/ADMIN: HCPCS | Performed by: FAMILY MEDICINE

## 2021-03-01 PROCEDURE — 3017F COLORECTAL CA SCREEN DOC REV: CPT | Performed by: FAMILY MEDICINE

## 2021-03-01 PROCEDURE — G8417 CALC BMI ABV UP PARAM F/U: HCPCS | Performed by: FAMILY MEDICINE

## 2021-03-01 RX ORDER — DOXAZOSIN MESYLATE 4 MG/1
4 TABLET ORAL NIGHTLY
Qty: 30 TABLET | Refills: 3 | Status: SHIPPED
Start: 2021-03-01 | End: 2021-05-24

## 2021-03-01 RX ORDER — CARVEDILOL 25 MG/1
TABLET ORAL
Qty: 60 TABLET | Refills: 3 | Status: SHIPPED
Start: 2021-03-01 | End: 2021-06-22 | Stop reason: SDUPTHER

## 2021-03-01 RX ORDER — ALBUTEROL SULFATE 90 UG/1
2 AEROSOL, METERED RESPIRATORY (INHALATION) EVERY 6 HOURS PRN
Qty: 18 G | Refills: 2 | Status: SHIPPED
Start: 2021-03-01 | End: 2021-06-22 | Stop reason: SDUPTHER

## 2021-03-01 RX ORDER — LISINOPRIL 40 MG/1
40 TABLET ORAL DAILY
Qty: 30 TABLET | Refills: 2 | Status: SHIPPED
Start: 2021-03-01 | End: 2021-06-02 | Stop reason: SDUPTHER

## 2021-03-01 RX ORDER — HYDROCHLOROTHIAZIDE 25 MG/1
25 TABLET ORAL DAILY
Qty: 30 TABLET | Refills: 3 | Status: SHIPPED
Start: 2021-03-01 | End: 2021-06-22 | Stop reason: SDUPTHER

## 2021-03-01 RX ORDER — FLUTICASONE PROPIONATE 100 MCG
BLISTER, WITH INHALATION DEVICE INHALATION
Qty: 60 EACH | Refills: 3 | Status: SHIPPED
Start: 2021-03-01 | End: 2021-06-22 | Stop reason: SDUPTHER

## 2021-03-01 RX ORDER — ALLOPURINOL 100 MG/1
TABLET ORAL
Qty: 30 TABLET | Refills: 3 | Status: SHIPPED
Start: 2021-03-01 | End: 2021-04-23

## 2021-03-01 RX ORDER — HYDRALAZINE HYDROCHLORIDE 25 MG/1
TABLET, FILM COATED ORAL
Qty: 90 TABLET | Refills: 3 | Status: SHIPPED
Start: 2021-03-01 | End: 2021-09-07

## 2021-03-01 RX ORDER — AMLODIPINE BESYLATE 10 MG/1
10 TABLET ORAL DAILY
Qty: 30 TABLET | Refills: 3 | Status: SHIPPED
Start: 2021-03-01 | End: 2021-06-22 | Stop reason: SDUPTHER

## 2021-03-01 ASSESSMENT — ENCOUNTER SYMPTOMS
DIARRHEA: 0
WHEEZING: 0
VOMITING: 0
CONSTIPATION: 0
COUGH: 0
NAUSEA: 0
ABDOMINAL PAIN: 0
SHORTNESS OF BREATH: 0

## 2021-03-01 NOTE — PROGRESS NOTES
S: 62 y.o. male here for follow up of htn, gout, asthma, and questionable abnormal blood glucose. Recently bp was elevated and he was only taking coreg daily, now taking bid. He stopped drinking soda and reduced sugar intake. He decreased his allopurinol to 100 mg. He has not had any gout flare-ups since. Asthma is well-controlled. Uses albuterol less than 1x per week. O: VS: /76 (Site: Left Upper Arm, Position: Sitting, Cuff Size: Large Adult)   Pulse 72   Temp 98.9 °F (37.2 °C)   Resp 16   Ht 5' 3\" (1.6 m)   Wt 191 lb (86.6 kg)   SpO2 97%   BMI 33.83 kg/m²    General: NAD   CV:  RRR, no gallops, rubs, or murmurs   Resp: CTAB no R/R/W   Abd:  Soft, nontender, no masses    Ext:  no C/C/E  Impression/Plan:   1. HTN-controlled today, had secondary htn work up  2. Gout-controlled  3. Asthma-controlled  4. Abnormal glucose-a1c today, weightloss, dietary modification      rto in 1-3 months    Attending Physician Statement  I have discussed the case, including pertinent history and exam findings with the resident. I agree with the documented assessment and plan.         Derick Escalona MD

## 2021-03-01 NOTE — PATIENT INSTRUCTIONS
Patient Education        Aprenda acerca de las pautas alimentarias para diabetes  Learning About Diabetes Food Guidelines  Instrucciones de cuidado    La planificación de las comidas es importante para el manejo de la diabetes. Ayuda a mantener el azúcar en la amador en el nivel ideal (que usted fija con ayers médico). Usted no tiene que comer alimentos especiales. Puede comer lo mismo que ayers rere, incluso dulces de vez en cuando. Mary debe prestar atención a la cantidad y la frecuencia con que come ciertos alimentos. Todd vez desee colaborar con un dietista o educador de diabetes certificado (CDE, por thanh siglas en inglés) para que le ayuden a planificar las comidas y los refrigerios. Un dietista o CDE también puede ayudarle a bajar de peso si star es jaimee de thanh objetivos. ¿Qué debería saber acerca de ingerir carbohidratos? Manejar la cantidad de carbohidratos que ingiere es lety parte importante de la alimentación saludable cuando tiene diabetes. Los carbohidratos se encuentran en muchos alimentos. · Sepa qué alimentos contienen carbohidratos. Y aprenda qué cantidad de carbohidratos contienen los diferentes alimentos. ? El pan, los cereales, la pasta y el arroz tienen aproximadamente 15 gramos de carbohidratos por porción. Lety porción equivale a 1 rebanada de pan (1 onza o 28 g), ½ taza de cereal cocido o 1/3 de taza de pasta o arroz cocidos. ? Las frutas tienen 15 gramos de carbohidratos por porción. Mertie Grate porción es 1 fruta fresca pequeña, ashanti lety [place] o lety naranja; ½ banana (plátano); ½ taza de fruta cocida o enlatada; ½ taza de jugo de fruta; 1 taza de melón o frambuesas; o 2 cucharadas de frutas secas. ? Claribel Birkenhead y el yogur sin azúcar agregado tienen 15 gramos de carbohidratos por porción. Mertie Grate porción es 1 taza de [place] o 2/3 taza de yogur sin azúcar agregado. ? Las verduras con almidón tienen 15 gramos de carbohidratos por porción. Lety porción es ½ taza de puré de papa o camote (batata, boniato); 1 taza de calabacín; ½ papa horneada pequeña; ½ taza de frijoles cocidos; o ½ taza de maíz (elote) o arvejas (chícharos) cocidos. · Aprenda cuántos carbohidratos debe consumir cada día y en cada comida. Un dietista o CDE le puede enseñar cómo llevar la cuenta de los carbohidratos que consume. A esto se le llama recuento de carbohidratos. · Si no está seguro de cómo [place] gramos de carbohidratos, utilice el Método [place] para planificar las comidas. Es lety Michelle Apo y rápida de asegurarse de que consuma comidas equilibradas. También le ayuda a distribuir los carbohidratos du el día. ? Divida el plato por tipo de alimento. Llene medio plato con verduras sin almidón, ponga carne u otras proteínas en lety cuarta parte del plato y granos o verduras con almidón en el último cuarto del plato. A esto puede agregarle un pequeño pedazo de fruta y 3 taza de [place] o yogur, según la cantidad de carbohidratos que deba consumir en lety comida. · Trate de comer aproximadamente la misma cantidad de carbohidratos en cada comida. No \"reserve\" ayers cantidad diaria de carbohidratos para consumirlos en lety steven comida. · Las proteínas contienen muy pocos o nada de carbohidratos por porción. Los ejemplos de proteínas incluyen carne de res, [place] geronimo, [place], [place], [place], tofu, Barbara-barre, requesón (\"cottage cheese\") y la mantequilla de cacahuate T[place]). Lety porción de carne son 3 onzas (85 g), lo cual es aproximadamente del tamaño de lety baraja de naipes. Los ejemplos de porciones de sustitutos de la carne (equivalente a 1 onza o 28 g de carne) son 1/4 de taza de requesón, 1 huevo, 1 cucharada de [place] de cacahuate y ½ taza de tofu. ¿Cómo puede comer fuera y aún así comer de modo saludable? · Aprenda a calcular los tamaños de las porciones de alimentos que contienen carbohidratos. Si mide la comida en casa, será más fácil calcular la cantidad en lety porción de comida de restaurante. · Si el platillo que pide contiene demasiados carbohidratos (ashanti mitchell, maíz o frijoles al horno), pida un alimento bajo en carbohidratos en haque lugar. Pida lety ensalada o verduras. · Si [place] insulina, revise haque azúcar en la amador antes y después de comer fuera para ayudarle a planear cuánto comer en el futuro. · Si usted come más carbohidratos de lo planeado en lety comida, dé un paseo o minnie otro tipo de ejercicio. [place] ayudará a reducir el azúcar en la amador. ¿Qué más debería saber? · Limite las grasas saturadas, ashanti la grasa de la carne y productos lácteos. Esta es lety opción saludable, porque las personas que tienen diabetes tienen un mayor riesgo de enfermedades del corazón. Así que elija castillo magros de carne y productos lácteos descremados o semidescremados. Utilice aceite de almendarez o de canola en lugar de mantequilla o manteca al cocinar. · No se salte comidas. Haque nivel de azúcar en la amador puede bajar demasiado si usted se salta comidas y kait insulina o ciertos medicamentos para la diabetes. · Consulte con haque médico antes de beber alcohol. El alcohol puede hacer que haque azúcar en la amador baje demasiado. El alcohol también puede causar lety reacción adversa si usted kait ciertos medicamentos para la diabetes. La atención de seguimiento es lety parte clave de haque tratamiento y seguridad. Asegúrese de hacer y acudir a todas las citas, y llame a haque médico si está teniendo problemas. También es lety buena idea saber los resultados de thanh exámenes y mantener lety lista de los medicamentos que kait. ¿Dónde puede encontrar más información en inglés? Devorah Sprague a https://chpepiceweb.healthData Driven Delivery System. org e ingrese a ayers cuenta de MyChart. Michael Linda R159 en el Carlee [employer]ighbours \"Search Health Information\" para más información (en inglés) sobre \"Aprenda acerca de las pautas alimentarias para diabetes. \"     Si no tiene lety cuenta, minnie clic en el enlace \"Sign Up Now\". Revisado: 20 javan, 9455               ROEPBNJ del contenido: 12.6  © 9137-9838 Healthwise, Incorporated. Las instrucciones de cuidado fueron adaptadas bajo licencia por [facility] ([facility]). Si usted tiene [place] afección médica o sobre estas instrucciones, siempre pregunte a ayers profesional de ruma. Healthwise, Incorporated niega toda garantía o responsabilidad por ayers uso de esta información.

## 2021-03-01 NOTE — PROGRESS NOTES
[facility]  FAMILY MEDICINE RESIDENCY PROGRAM  DATE OF VISIT : 3/1/2021    Patient : José Contreras   Age : 62 y.o.  : 1960   MRN : [id number]   ______________________________________________________________________    Chief Complaint :   Chief Complaint   Patient presents with    1 Month Follow-Up    Diabetes    Hyperlipidemia       HPI : José Contreras is 62 y.o. male who presented to the clinic today for HTN follow-up. Patient seen 21 at the time BP was uncontrolled. Patient had been takign Coreg 25mg Daily instead of BID as prescribed. Discuss with patient appropriate dosing. Patient has been taking Coreg BID now and BP is improved. Asymptomatic. DM vs Pre-DM?: CMP noted a BG of 188. Previous BMPs show elevated BG as well, last A1C 3/22/18 5.5. During visit Lipid panel was ordered with showed mildly elevated triglycerides. Lab results discussed with patient. Gout: controlled. Taking 100mg instead of 300mg for over a month and has not noticed any difference, would like to continue lower dose if possible. Asthma: Controlled     I reviewed the patient's past medications, allergies and past medical history during this visit.     Past Medical History :        Past Medical History:   Diagnosis Date    Asthma     Chronic back pain     COPD (chronic obstructive pulmonary disease) (HCC)     Depression     Gout     Headache     Hypertension     Osteoarthritis      Past Surgical History:   Procedure Laterality Date    COLONOSCOPY      [place] for BRBPR    COLONOSCOPY  2017       Social History :  Social History     Tobacco History     Smoking Status  Never Smoker    Smokeless Tobacco Use  Never Used          Alcohol History     Alcohol Use Status  No          Drug Use     Drug Use Status  No          Sexual Activity     Sexually Active  Yes                 Allergies :   No Known Allergies    Medication List : Current Outpatient Medications   Medication Sig Dispense Refill    allopurinol (ZYLOPRIM) 100 MG tablet TAKE 1 TABLET BY MOUTH TWICE DAILY 30 tablet 3    amLODIPine (NORVASC) 10 MG tablet Take 1 tablet by mouth daily 30 tablet 3    carvedilol (COREG) 25 MG tablet TAKE 1 TABLET BY MOUTH TWICE DAILY 60 tablet 3    doxazosin (CARDURA) 4 MG tablet Take 1 tablet by mouth nightly 30 tablet 3    fluticasone propionate (FLOVENT DISKUS) 100 MCG/BLIST AEPB inhaler INHALE 1 PUFF INTO THE LUNGS EVERY DAY 60 each 3    hydrALAZINE (APRESOLINE) 25 MG tablet TAKE 1 TABLET BY MOUTH THREE TIMES DAILY 90 tablet 3    hydroCHLOROthiazide (HYDRODIURIL) 25 MG tablet Take 1 tablet by mouth daily 30 tablet 3    albuterol sulfate  (90 Base) MCG/ACT inhaler Inhale 2 puffs into the lungs every 6 hours as needed for Wheezing 18 g 2    lisinopril (PRINIVIL;ZESTRIL) 40 MG tablet Take 1 tablet by mouth daily 30 tablet 2    Magic Mouthwash (MIRACLE MOUTHWASH) Swish and spit 5 mLs 4 times daily as needed for Irritation 240 mL 0     No current facility-administered medications for this visit. Review of Systems :  Review of Systems   Constitutional: Negative for fatigue and fever. Respiratory: Negative for cough, shortness of breath and wheezing. Cardiovascular: Negative for chest pain, palpitations and leg swelling. Gastrointestinal: Negative for abdominal pain, constipation, diarrhea, nausea and vomiting. Endocrine: Negative for polydipsia and polyuria. Neurological: Negative for dizziness, weakness, light-headedness and numbness. ______________________________________________________________________    Physical Exam :    Vitals: /76 (Site: Left Upper Arm, Position: Sitting, Cuff Size: Large Adult)   Pulse 72   Temp 98.9 °F (37.2 °C)   Resp 16   Ht 5' 3\" (1.6 m)   Wt 191 lb (86.6 kg)   SpO2 97%   BMI 33.83 kg/m²   Physical Exam  Constitutional:       General: He is not in acute distress. - albuterol sulfate  (90 Base) MCG/ACT inhaler; Inhale 2 puffs into the lungs every 6 hours as needed for Wheezing  Dispense: 18 g; Refill: 2    4. Chronic gout of right foot, unspecified cause  -stable  - allopurinol (ZYLOPRIM) 100 MG tablet; TAKE 1 TABLET BY MOUTH TWICE DAILY  Dispense: 30 tablet;  Refill: 3        Additional plan and future considerations:   RTO in 3mos for prediabetes fu    Return to Office: Return in about 3 months (around 6/1/2021) for PDM fu.    Nesha Palacio MD   Case discussed with Dr. Weber Daily

## 2021-03-09 ENCOUNTER — TELEPHONE (OUTPATIENT)
Dept: FAMILY MEDICINE CLINIC | Age: 61
End: 2021-03-09

## 2021-03-09 NOTE — TELEPHONE ENCOUNTER
Pt pharmacy called in requesting what kind of combination would be good for pt for the magic mouthwash? They recommend maalox, lidocaine and benadryl. If that is ok please advise and thank you. If not please specify with new script or contact pt pharmacy.

## 2021-03-31 ENCOUNTER — HOSPITAL ENCOUNTER (EMERGENCY)
Age: 61
Discharge: HOME | End: 2021-03-31
Payer: MEDICARE

## 2021-03-31 VITALS
HEIGHT: 63 IN | DIASTOLIC BLOOD PRESSURE: 107 MMHG | OXYGEN SATURATION: 96 % | WEIGHT: 191 LBS | SYSTOLIC BLOOD PRESSURE: 197 MMHG | HEART RATE: 60 BPM | TEMPERATURE: 98 F | BODY MASS INDEX: 33.84 KG/M2 | RESPIRATION RATE: 16 BRPM

## 2021-03-31 DIAGNOSIS — I10 HYPERTENSION, UNSPECIFIED TYPE: ICD-10-CM

## 2021-03-31 DIAGNOSIS — R22.0 FACIAL SWELLING: ICD-10-CM

## 2021-03-31 DIAGNOSIS — K08.89 PAIN, DENTAL: Primary | ICD-10-CM

## 2021-03-31 PROCEDURE — 99283 EMERGENCY DEPT VISIT LOW MDM: CPT

## 2021-03-31 PROCEDURE — 6370000000 HC RX 637 (ALT 250 FOR IP): Performed by: PHYSICIAN ASSISTANT

## 2021-03-31 RX ORDER — AMOXICILLIN AND CLAVULANATE POTASSIUM 500; 125 MG/1; MG/1
1 TABLET, FILM COATED ORAL EVERY 8 HOURS SCHEDULED
Status: DISCONTINUED | OUTPATIENT
Start: 2021-03-31 | End: 2021-03-31

## 2021-03-31 RX ORDER — CLONIDINE HYDROCHLORIDE 0.1 MG/1
0.1 TABLET ORAL ONCE
Status: COMPLETED | OUTPATIENT
Start: 2021-03-31 | End: 2021-03-31

## 2021-03-31 RX ORDER — AMOXICILLIN AND CLAVULANATE POTASSIUM 500; 125 MG/1; MG/1
1 TABLET, FILM COATED ORAL 3 TIMES DAILY
Qty: 30 TABLET | Refills: 0 | Status: SHIPPED | OUTPATIENT
Start: 2021-03-31 | End: 2021-04-10

## 2021-03-31 RX ORDER — AMOXICILLIN AND CLAVULANATE POTASSIUM 500; 125 MG/1; MG/1
1 TABLET, FILM COATED ORAL ONCE
Status: COMPLETED | OUTPATIENT
Start: 2021-03-31 | End: 2021-03-31

## 2021-03-31 RX ADMIN — AMOXICILLIN AND CLAVULANATE POTASSIUM 1 TABLET: 500; 125 TABLET, FILM COATED ORAL at 20:44

## 2021-03-31 RX ADMIN — CLONIDINE HYDROCHLORIDE 0.1 MG: 0.1 TABLET ORAL at 20:44

## 2021-04-01 NOTE — ED PROVIDER NOTES
[facility]  HPI:  3/31/21, Time: 8:30 PM EDT         Cedric Graham is a 62 y.o. male presenting to the ED for right lower facial swelling , beginning prior to arrival .  The complaint has been persistent, mild in severity, and worsened by nothing. Patient comes in with complaint of right lower jaw swelling. He denies any pain. He states he woke up with the swelling present. No difficulty swallowing or shortness of breath. Patient is noted to have elevated blood pressure on his has a history of hypertension states he did take his meds this morning. He denies any headache lightheadedness dizziness blurred vision loss of vision. No chest pain palpitations diaphoresis. Review of Systems:   A complete review of systems was performed and pertinent positives and negatives are stated within HPI, all other systems reviewed and are negative.          --------------------------------------------- PAST HISTORY ---------------------------------------------  Past Medical History:  has a past medical history of Asthma, Chronic back pain, COPD (chronic obstructive pulmonary disease) ([facility] [address] Depression, Gout, Headache, Hypertension, and Osteoarthritis. Past Surgical History:  has a past surgical history that includes Colonoscopy (2000) and Colonoscopy (04/13/2017). Social History:  reports that he has never smoked. He has never used smokeless tobacco. He reports that he does not drink alcohol or use drugs. Family History: family history includes Arthritis in his brother, father, maternal grandfather, maternal grandmother, mother, and sister; Asthma in his brother and sister; Cancer in his maternal grandfather and maternal grandmother; Cancer (age of onset: 80) in his paternal grandmother; Diabetes in his brother, mother, and sister; High Blood Pressure in his brother, father, maternal grandmother, mother, and sister; High Cholesterol in his brother, father, maternal grandmother, and sister.      The patients home medications have been reviewed. Allergies: Patient has no known allergies. -------------------------------------------------- RESULTS -------------------------------------------------  All laboratory and radiology results have been personally reviewed by myself   LABS:  No results found for this visit on 03/31/21. RADIOLOGY:  Interpreted by Radiologist.  No orders to display       ------------------------- NURSING NOTES AND VITALS REVIEWED ---------------------------   The nursing notes within the ED encounter and vital signs as below have been reviewed. BP (!) 197/107   Pulse 60   Temp 98 °F (36.7 °C)   Resp 16   Ht 5' 3\" (1.6 m)   Wt 191 lb (86.6 kg)   SpO2 96%   BMI 33.83 kg/m²   Oxygen Saturation Interpretation: abnormal       ---------------------------------------------------PHYSICAL EXAM--------------------------------------      Constitutional/General: Alert and oriented x3, well appearing, non toxic in NAD  Head: Normocephalic and atraumatic  Eyes: PERRL, EOMI  Mouth: Oropharynx clear, handling secretions, no trismus poor dentition. There is swelling surrounding tooth #28. Swelling of the right lower jaw present no adenopathy  Neck: Supple, full ROM, no adenopathy  Pulmonary: Lungs clear to auscultation bilaterally, no wheezes, rales, or rhonchi. Not in respiratory distress  Cardiovascular:  Regular rate and rhythm, no murmurs, gallops, or rubs. 2+ distal pulses  Abdomen: Soft, non tender, non distended,   Extremities: Moves all extremities x 4.  Warm and well perfused  Skin: warm and dry without rash  Neurologic: GCS 15,  Psych: Normal Affect      ------------------------------ ED COURSE/MEDICAL DECISION MAKING----------------------  Medications   amoxicillin-clavulanate (AUGMENTIN) 500-125 MG per tablet 1 tablet (1 tablet Oral Given 3/31/21 2044)   cloNIDine (CATAPRES) tablet 0.1 mg (0.1 mg Oral Given 3/31/21 2044)         ED COURSE:       Medical Decision Making: Patient comes in with swelling of the right lower jaw patient has poor dentition. No abscess noted. He was placed on Augmentin given referral to the dental clinic in the a.m. He was noted to have elevated blood pressure but was asymptomatic has history of hypertension was given a dose of clonidine. Counseling: The emergency provider has spoken with the patient and discussed todays results, in addition to providing specific details for the plan of care and counseling regarding the diagnosis and prognosis. Questions are answered at this time and they are agreeable with the plan.      --------------------------------- IMPRESSION AND DISPOSITION ---------------------------------    IMPRESSION  1. Pain, dental    2. Facial swelling    3. Hypertension, unspecified type        DISPOSITION  Disposition: Discharge to home  Patient condition is good      NOTE: This report was transcribed using voice recognition software.  Every effort was made to ensure accuracy; however, inadvertent computerized transcription errors may be present      Dia Lazar [place]  03/31/21 2220

## 2021-04-23 DIAGNOSIS — M1A.0710 CHRONIC GOUT OF RIGHT FOOT, UNSPECIFIED CAUSE: ICD-10-CM

## 2021-04-23 RX ORDER — ALLOPURINOL 100 MG/1
TABLET ORAL
Qty: 30 TABLET | Refills: 3 | Status: SHIPPED
Start: 2021-04-23 | End: 2021-12-08 | Stop reason: SDUPTHER

## 2021-05-22 DIAGNOSIS — I10 RESISTANT HYPERTENSION: ICD-10-CM

## 2021-05-24 RX ORDER — DOXAZOSIN MESYLATE 4 MG/1
TABLET ORAL
Qty: 30 TABLET | Refills: 3 | Status: SHIPPED
Start: 2021-05-24 | Stop reason: SDUPTHER

## 2021-06-22 ENCOUNTER — OFFICE VISIT (OUTPATIENT)
Dept: FAMILY MEDICINE CLINIC | Age: 61
End: 2021-06-22
Payer: MEDICARE

## 2021-06-22 VITALS
WEIGHT: 186 LBS | HEIGHT: 63 IN | BODY MASS INDEX: 32.96 KG/M2 | TEMPERATURE: 99 F | SYSTOLIC BLOOD PRESSURE: 143 MMHG | HEART RATE: 79 BPM | DIASTOLIC BLOOD PRESSURE: 85 MMHG | OXYGEN SATURATION: 98 %

## 2021-06-22 DIAGNOSIS — R73.03 PREDIABETES: Primary | ICD-10-CM

## 2021-06-22 DIAGNOSIS — J45.40 MODERATE PERSISTENT ASTHMA WITHOUT COMPLICATION: ICD-10-CM

## 2021-06-22 DIAGNOSIS — I10 RESISTANT HYPERTENSION: ICD-10-CM

## 2021-06-22 LAB — HBA1C MFR BLD: 6 %

## 2021-06-22 PROCEDURE — 3017F COLORECTAL CA SCREEN DOC REV: CPT | Performed by: FAMILY MEDICINE

## 2021-06-22 PROCEDURE — 1036F TOBACCO NON-USER: CPT | Performed by: FAMILY MEDICINE

## 2021-06-22 PROCEDURE — G8417 CALC BMI ABV UP PARAM F/U: HCPCS | Performed by: FAMILY MEDICINE

## 2021-06-22 PROCEDURE — 99212 OFFICE O/P EST SF 10 MIN: CPT | Performed by: FAMILY MEDICINE

## 2021-06-22 PROCEDURE — 99213 OFFICE O/P EST LOW 20 MIN: CPT | Performed by: FAMILY MEDICINE

## 2021-06-22 PROCEDURE — 83036 HEMOGLOBIN GLYCOSYLATED A1C: CPT | Performed by: FAMILY MEDICINE

## 2021-06-22 PROCEDURE — G8427 DOCREV CUR MEDS BY ELIG CLIN: HCPCS | Performed by: FAMILY MEDICINE

## 2021-06-22 RX ORDER — HYDROCHLOROTHIAZIDE 25 MG/1
25 TABLET ORAL DAILY
Qty: 90 TABLET | Refills: 1 | Status: SHIPPED
Start: 2021-06-22 | End: 2021-12-08 | Stop reason: SDUPTHER

## 2021-06-22 RX ORDER — AMLODIPINE BESYLATE 10 MG/1
10 TABLET ORAL DAILY
Qty: 90 TABLET | Refills: 1 | Status: SHIPPED
Start: 2021-06-22 | End: 2021-12-08 | Stop reason: SDUPTHER

## 2021-06-22 RX ORDER — ALBUTEROL SULFATE 90 UG/1
2 AEROSOL, METERED RESPIRATORY (INHALATION) EVERY 6 HOURS PRN
Qty: 18 G | Refills: 2 | Status: SHIPPED
Start: 2021-06-22 | End: 2021-10-13

## 2021-06-22 RX ORDER — LISINOPRIL 40 MG/1
40 TABLET ORAL DAILY
Qty: 90 TABLET | Refills: 1 | Status: SHIPPED
Start: 2021-06-22 | End: 2021-12-08 | Stop reason: SDUPTHER

## 2021-06-22 RX ORDER — CARVEDILOL 25 MG/1
TABLET ORAL
Qty: 90 TABLET | Refills: 1 | Status: SHIPPED
Start: 2021-06-22 | End: 2021-09-20 | Stop reason: SDUPTHER

## 2021-06-22 RX ORDER — FLUTICASONE PROPIONATE 100 MCG
BLISTER, WITH INHALATION DEVICE INHALATION
Qty: 60 EACH | Refills: 3 | Status: SHIPPED
Start: 2021-06-22 | End: 2021-10-26

## 2021-06-22 SDOH — ECONOMIC STABILITY: FOOD INSECURITY: WITHIN THE PAST 12 MONTHS, YOU WORRIED THAT YOUR FOOD WOULD RUN OUT BEFORE YOU GOT MONEY TO BUY MORE.: NEVER TRUE

## 2021-06-22 SDOH — ECONOMIC STABILITY: FOOD INSECURITY: WITHIN THE PAST 12 MONTHS, THE FOOD YOU BOUGHT JUST DIDN'T LAST AND YOU DIDN'T HAVE MONEY TO GET MORE.: NEVER TRUE

## 2021-06-22 ASSESSMENT — ENCOUNTER SYMPTOMS
ABDOMINAL PAIN: 0
NAUSEA: 0
WHEEZING: 0
VOMITING: 0
COUGH: 0
SHORTNESS OF BREATH: 0
DIARRHEA: 0
CONSTIPATION: 0

## 2021-06-22 ASSESSMENT — SOCIAL DETERMINANTS OF HEALTH (SDOH): HOW HARD IS IT FOR YOU TO PAY FOR THE VERY BASICS LIKE FOOD, HOUSING, MEDICAL CARE, AND HEATING?: NOT VERY HARD

## 2021-06-22 NOTE — PROGRESS NOTES
[facility]  FAMILY MEDICINE RESIDENCY PROGRAM  DATE OF VISIT : 2021    Patient : Sona Ryan   Age : 62 y.o.  : 1960   MRN : [id number]   ______________________________________________________________________    Chief Complaint :   Chief Complaint   Patient presents with    Check-Up       HPI : Sona Ryan is 62 y.o. male who presented to the clinic today for office visit. HTN: Norvasc 10mg daily, Coreg 25mg BID, HCTZ 25mg daily, Lisinopril 40mg daily, Cardura 4mg daily, Hydralazine 25mg TID. Per patient BP at home is usually 130s/70s. Patient previously worked-up for secondary HTN by Dr. Elina Gregg. Pre-diabetes: Last A1C 6.3. Patient not checking BG at home. Currently on no meds. I reviewed the patient's past medications, allergies and past medical history during this visit.     Past Medical History :        Past Medical History:   Diagnosis Date    Asthma     Chronic back pain     COPD (chronic obstructive pulmonary disease) (HCC)     Depression     Gout     Headache     Hypertension     Osteoarthritis      Past Surgical History:   Procedure Laterality Date    COLONOSCOPY      [place] for BRBPR    COLONOSCOPY  2017       Social History :  Social History     Tobacco History     Smoking Status  Never Smoker    Smokeless Tobacco Use  Never Used          Alcohol History     Alcohol Use Status  No          Drug Use     Drug Use Status  No          Sexual Activity     Sexually Active  Yes                 Allergies :   No Known Allergies    Medication List :    Current Outpatient Medications   Medication Sig Dispense Refill    lisinopril (PRINIVIL;ZESTRIL) 40 MG tablet Take 1 tablet by mouth daily 90 tablet 1    amLODIPine (NORVASC) 10 MG tablet Take 1 tablet by mouth daily 90 tablet 1    carvedilol (COREG) 25 MG tablet TAKE 1 TABLET BY MOUTH TWICE DAILY 90 tablet 1    fluticasone propionate (FLOVENT DISKUS) 100 MCG/BLIST AEPB inhaler INHALE 1 PUFF INTO THE LUNGS EVERY DAY 60 each 3    hydroCHLOROthiazide (HYDRODIURIL) 25 MG tablet Take 1 tablet by mouth daily 90 tablet 1    albuterol sulfate  (90 Base) MCG/ACT inhaler Inhale 2 puffs into the lungs every 6 hours as needed for Wheezing 18 g 2    doxazosin (CARDURA) 4 MG tablet TAKE 1 TABLET BY MOUTH EVERY DAY 30 tablet 3    allopurinol (ZYLOPRIM) 100 MG tablet TAKE 1 TABLET BY MOUTH TWICE DAILY 30 tablet 3    hydrALAZINE (APRESOLINE) 25 MG tablet TAKE 1 TABLET BY MOUTH THREE TIMES DAILY 90 tablet 3    Magic Mouthwash (MIRACLE MOUTHWASH) Swish and spit 5 mLs 4 times daily as needed for Irritation 240 mL 0     No current facility-administered medications for this visit. Review of Systems :  Review of Systems   Constitutional: Negative for chills, fatigue and fever. Respiratory: Negative for cough, shortness of breath and wheezing. Cardiovascular: Negative for chest pain, palpitations and leg swelling. Gastrointestinal: Negative for abdominal pain, constipation, diarrhea, nausea and vomiting. Endocrine: Negative for polydipsia and polyuria. Neurological: Negative for dizziness, weakness, numbness and headaches.     ______________________________________________________________________    Physical Exam :    Vitals: BP (!) 143/85 (Site: Left Upper Arm)   Pulse 79   Temp 99 °F (37.2 °C) (Temporal)   Ht 5' 3\" (1.6 m)   Wt 186 lb (84.4 kg)   SpO2 98%   BMI 32.95 kg/m²   Physical Exam  Constitutional:       Appearance: Normal appearance. Cardiovascular:      Rate and Rhythm: Normal rate and regular rhythm. Pulses: Normal pulses. Heart sounds: Normal heart sounds. No murmur heard. Pulmonary:      Effort: Pulmonary effort is normal. No respiratory distress. Breath sounds: Normal breath sounds. Abdominal:      General: Bowel sounds are normal. There is no distension. Palpations: Abdomen is soft. Tenderness:  There is no abdominal tenderness. Musculoskeletal:      Right lower leg: No edema. Left lower leg: No edema. Neurological:      Mental Status: He is alert.         ___________________    Assessment & Plan :    1. Resistant hypertension  - follows with nephor  - Bps at home 130a/70s per patient  - keep bp log for 5 days and call numbers in on Monday   - patient to call nephro  To schedule follow-up appt  - lisinopril (PRINIVIL;ZESTRIL) 40 MG tablet; Take 1 tablet by mouth daily  Dispense: 90 tablet; Refill: 1  - amLODIPine (NORVASC) 10 MG tablet; Take 1 tablet by mouth daily  Dispense: 90 tablet; Refill: 1  - carvedilol (COREG) 25 MG tablet; TAKE 1 TABLET BY MOUTH TWICE DAILY  Dispense: 90 tablet; Refill: 1  - hydroCHLOROthiazide (HYDRODIURIL) 25 MG tablet; Take 1 tablet by mouth daily  Dispense: 90 tablet; Refill: 1    2. Moderate persistent asthma without complication  -stable  - fluticasone propionate (FLOVENT DISKUS) 100 MCG/BLIST AEPB inhaler; INHALE 1 PUFF INTO THE LUNGS EVERY DAY  Dispense: 60 each; Refill: 3  - albuterol sulfate  (90 Base) MCG/ACT inhaler; Inhale 2 puffs into the lungs every 6 hours as needed for Wheezing  Dispense: 18 g; Refill: 2    3. Prediabetes  - A1C today 6.0  - continue current management with dietary and lifestyle modificatiosn  - POCT glycosylated hemoglobin (Hb A1C)        Additional plan and future considerations:   RTO In 6mos if Bps stable at home    Return to Office: Return in about 6 months (around 12/22/2021) for Pre-DM and HTN.     Elio Lobo MD   Case discussed with Dr. Deangelo Romero

## 2021-06-22 NOTE — PROGRESS NOTES
S: 62 y.o. male here for htn and predm follow up. He follows with Dr. Jose Gtz for htn and a secondary htn work up. Reports bp at home is 130s/80. His last a1c was 6.3.     O: VS: BP (!) 143/85 (Site: Left Upper Arm)   Pulse 79   Temp 99 °F (37.2 °C) (Temporal)   Ht 5' 3\" (1.6 m)   Wt 186 lb (84.4 kg)   SpO2 98%   BMI 32.95 kg/m²    General: NAD   CV:  RRR, no gallops, rubs, or murmurs   Resp: CTAB no R/R/W   Abd:  Soft, nontender, no masses    Ext:  no C/C/E  Impression/Plan:   1. HTN-better controlled now. Follow up with Dr. Jose Gtz. 2. Predm-a1c 6.0. Continue lifestyle modification. rto in 3 months for copd/htn follow up    Attending Physician Statement  I have discussed the case, including pertinent history and exam findings with the resident. I agree with the documented assessment and plan.         Teofilo Huerta MD

## 2021-09-05 DIAGNOSIS — I10 RESISTANT HYPERTENSION: ICD-10-CM

## 2021-09-07 RX ORDER — HYDRALAZINE HYDROCHLORIDE 25 MG/1
TABLET, FILM COATED ORAL
Qty: 150 TABLET | Refills: 1 | Status: SHIPPED
Start: 2021-09-07 | End: 2021-12-15

## 2021-09-18 DIAGNOSIS — I10 RESISTANT HYPERTENSION: ICD-10-CM

## 2021-09-20 RX ORDER — DOXAZOSIN MESYLATE 4 MG/1
TABLET ORAL
Qty: 90 TABLET | Refills: 1 | Status: SHIPPED | OUTPATIENT
Start: 2021-09-20

## 2021-09-20 RX ORDER — CARVEDILOL 25 MG/1
TABLET ORAL
Qty: 180 TABLET | Refills: 1 | Status: SHIPPED
Start: 2021-09-20 | End: 2022-03-23

## 2021-10-13 DIAGNOSIS — J45.40 MODERATE PERSISTENT ASTHMA WITHOUT COMPLICATION: ICD-10-CM

## 2021-10-13 RX ORDER — ALBUTEROL SULFATE 90 UG/1
2 AEROSOL, METERED RESPIRATORY (INHALATION) EVERY 6 HOURS PRN
Qty: 18 G | Refills: 2 | Status: SHIPPED
Start: 2021-10-13 | End: 2021-12-08 | Stop reason: SDUPTHER

## 2021-10-26 DIAGNOSIS — J45.40 MODERATE PERSISTENT ASTHMA WITHOUT COMPLICATION: ICD-10-CM

## 2021-10-26 RX ORDER — FLUTICASONE PROPIONATE 100 MCG
BLISTER, WITH INHALATION DEVICE INHALATION
Qty: 60 EACH | Refills: 3 | Status: SHIPPED
Start: 2021-10-26 | End: 2022-02-23

## 2021-12-08 ENCOUNTER — OFFICE VISIT (OUTPATIENT)
Dept: FAMILY MEDICINE CLINIC | Age: 61
End: 2021-12-08
Payer: MEDICARE

## 2021-12-08 VITALS
SYSTOLIC BLOOD PRESSURE: 125 MMHG | DIASTOLIC BLOOD PRESSURE: 67 MMHG | HEIGHT: 63 IN | WEIGHT: 192 LBS | HEART RATE: 80 BPM | BODY MASS INDEX: 34.02 KG/M2 | TEMPERATURE: 99.3 F

## 2021-12-08 DIAGNOSIS — I10 RESISTANT HYPERTENSION: ICD-10-CM

## 2021-12-08 DIAGNOSIS — M1A.0710 CHRONIC GOUT OF RIGHT FOOT, UNSPECIFIED CAUSE: ICD-10-CM

## 2021-12-08 DIAGNOSIS — R73.09 ELEVATED GLUCOSE LEVEL: Primary | ICD-10-CM

## 2021-12-08 DIAGNOSIS — J45.40 MODERATE PERSISTENT ASTHMA WITHOUT COMPLICATION: ICD-10-CM

## 2021-12-08 LAB — HBA1C MFR BLD: 6.3 %

## 2021-12-08 PROCEDURE — G8484 FLU IMMUNIZE NO ADMIN: HCPCS | Performed by: FAMILY MEDICINE

## 2021-12-08 PROCEDURE — G8427 DOCREV CUR MEDS BY ELIG CLIN: HCPCS | Performed by: FAMILY MEDICINE

## 2021-12-08 PROCEDURE — G8417 CALC BMI ABV UP PARAM F/U: HCPCS | Performed by: FAMILY MEDICINE

## 2021-12-08 PROCEDURE — 99212 OFFICE O/P EST SF 10 MIN: CPT | Performed by: FAMILY MEDICINE

## 2021-12-08 PROCEDURE — 99213 OFFICE O/P EST LOW 20 MIN: CPT | Performed by: FAMILY MEDICINE

## 2021-12-08 PROCEDURE — 3017F COLORECTAL CA SCREEN DOC REV: CPT | Performed by: FAMILY MEDICINE

## 2021-12-08 PROCEDURE — 83036 HEMOGLOBIN GLYCOSYLATED A1C: CPT | Performed by: FAMILY MEDICINE

## 2021-12-08 PROCEDURE — 1036F TOBACCO NON-USER: CPT | Performed by: FAMILY MEDICINE

## 2021-12-08 RX ORDER — HYDROCHLOROTHIAZIDE 25 MG/1
25 TABLET ORAL DAILY
Qty: 90 TABLET | Refills: 1 | Status: SHIPPED
Start: 2021-12-08 | End: 2022-06-14

## 2021-12-08 RX ORDER — AMLODIPINE BESYLATE 10 MG/1
10 TABLET ORAL DAILY
Qty: 90 TABLET | Refills: 1 | Status: SHIPPED
Start: 2021-12-08 | End: 2022-06-14

## 2021-12-08 RX ORDER — ALLOPURINOL 100 MG/1
100 TABLET ORAL 2 TIMES DAILY
Qty: 180 TABLET | Refills: 1 | Status: SHIPPED
Start: 2021-12-08 | End: 2022-06-13

## 2021-12-08 RX ORDER — LISINOPRIL 40 MG/1
40 TABLET ORAL DAILY
Qty: 90 TABLET | Refills: 1 | Status: SHIPPED
Start: 2021-12-08 | End: 2022-05-31

## 2021-12-08 RX ORDER — ALBUTEROL SULFATE 90 UG/1
2 AEROSOL, METERED RESPIRATORY (INHALATION) EVERY 6 HOURS PRN
Qty: 18 G | Refills: 2 | Status: SHIPPED
Start: 2021-12-08 | End: 2022-03-04

## 2021-12-08 NOTE — PROGRESS NOTES
[address] [facility]  FAMILY MEDICINE RESIDENCY PROGRAM  DATE OF VISIT : 2021    Patient : Louis Talavera   Age : 62 y.o.  : 1960   MRN : [id number]   ______________________________________________________________________    Chief Complaint :   Chief Complaint   Patient presents with    Hypertension    Diabetes       HPI : Louis Talavera is 62 y.o. male who presented to the clinic today for office visit. HTN: Norvasc 10mg daily, Coreg 25mg BID, HCTZ 25mg daily, Lisinopril 40mg daily, Cardura 4mg daily, Hydralazine 25mg TID. Per patient BP at home is usually 120s/60s. Patient previously worked-up for secondary HTN by Dr. Sebastien Villalpando.      Pre-diabetes: Last A1C 6.0 (). Patient not checking BG at home. Currently on no meds. Has been consistent with his dietary habits. Gout: stable on allopurinol BID. I reviewed the patient's past medications, allergies and past medical history during this visit.     Past Medical History :        Past Medical History:   Diagnosis Date    Asthma     Chronic back pain     COPD (chronic obstructive pulmonary disease) (HCC)     Depression     Gout     Headache     Hypertension     Osteoarthritis      Past Surgical History:   Procedure Laterality Date    [place]ONOSCOPY      [place] for [facility]    COLONOSCOPY  2017       Social History :  Social History     Tobacco History     Smoking Status  Never Smoker    Smokeless Tobacco Use  Never Used          Alcohol History     Alcohol Use Status  No          Drug Use     Drug Use Status  No          Sexual Activity     Sexually Active  Yes                 Allergies :   No Known Allergies    Medication List :    Current Outpatient Medications   Medication Sig Dispense Refill    albuterol sulfate  (90 Base) MCG/ACT inhaler Inhale 2 puffs into the lungs every 6 hours as needed for Wheezing 18 g 2    lisinopril (PRINIVIL;ZESTRIL) 40 MG tablet Take 1 tablet by mouth daily 90 tablet 1    amLODIPine (NORVASC) 10 MG tablet Take 1 tablet by mouth daily 90 tablet 1    hydroCHLOROthiazide (HYDRODIURIL) 25 MG tablet Take 1 tablet by mouth daily 90 tablet 1    allopurinol (ZYLOPRIM) 100 MG tablet Take 1 tablet by mouth 2 times daily 180 tablet 1    Magic Mouthwash (MIRACLE MOUTHWASH) Swish and spit 5 mLs 4 times daily as needed for Irritation 240 mL 0    FLOVENT DISKUS 100 MCG/BLIST AEPB inhaler INHALE 1 PUFF INTO THE LUNGS EVERY DAY 60 each 3    carvedilol (COREG) 25 MG tablet TAKE 1 TABLET BY MOUTH TWICE DAILY 180 tablet 1    doxazosin (CARDURA) 4 MG tablet TAKE 1 TABLET BY MOUTH EVERY NIGHT 90 tablet 1    hydrALAZINE (APRESOLINE) 25 MG tablet TAKE 1 TABLET BY MOUTH THREE TIMES DAILY 150 tablet 1     No current facility-administered medications for this visit. Review of Systems :  Review of Systems  ______________________________________________________________________    Physical Exam :    Vitals: /67   Pulse 80   Temp 99.3 °F (37.4 °C) (Temporal)   Ht 5' 3\" (1.6 m)   Wt 192 lb (87.1 kg)   BMI 34.01 kg/m²   Physical Exam    ___________________    Assessment & Plan :    1. Resistant hypertension  - stbale  - continue present mgmt  - lisinopril (PRINIVIL;ZESTRIL) 40 MG tablet; Take 1 tablet by mouth daily  Dispense: 90 tablet; Refill: 1  - amLODIPine (NORVASC) 10 MG tablet; Take 1 tablet by mouth daily  Dispense: 90 tablet; Refill: 1  - hydroCHLOROthiazide (HYDRODIURIL) 25 MG tablet; Take 1 tablet by mouth daily  Dispense: 90 tablet; Refill: 1    2. Chronic gout of right foot, unspecified cause  - stable  - allopurinol (ZYLOPRIM) 100 MG tablet; Take 1 tablet by mouth 2 times daily  Dispense: 180 tablet; Refill: 1    3.  Pre-diabetes  - stable  - A1C today 6.3  - POCT glycosylated hemoglobin (Hb A1C)      Educational materials and/or home exercises printed for patient's review and were included in patient instructions on his/her After Visit Summary and given to patient at the end of visit. Counseled regarding above diagnosis, including possible risks and complications,  especially if left uncontrolled. Counseled regarding the possible side effects, risks, benefits and alternatives to treatment; patient and/or guardian verbalizes understanding, agrees, feels comfortable with and wishes to proceed with above treatment plan. Advised patient to call with any new medication issues, and read all Rx info from pharmacy to assure aware of all possible risks and side effects of medication before taking. Reviewed age and gender appropriate health screening exams and vaccinations. Advised patient regarding importance of keeping up with recommended health maintenance and to schedule as soon as possible if overdue, as this is important in assessing for undiagnosed pathology, especially cancer, as well as protecting against potentially harmful/life threatening disease. Patient and/or guardian verbalizes understanding and agrees with above counseling, assessment and plan. All questions answered    Additional plan and future considerations:   RTO in 6mos    Return to Office: Return in about 6 months (around 6/8/2022).     Myrtle Oneill MD   Case discussed with Dr. Monster Leo

## 2021-12-08 NOTE — PROGRESS NOTES
S: 62 y.o. male presents today for:   HTN- on several meds, well controlled. Did see Nephrology, extensive workup  Prediabetes- A1C is 6.3, no meds, not checking sugars. UTD HM    O: VS: /67   Pulse 80   Temp 99.3 °F (37.4 °C) (Temporal)   Ht 5' 3\" (1.6 m)   Wt 192 lb (87.1 kg)   BMI 34.01 kg/m²   AAO/NAD, appropriate affect for mood  CV:  RRR, no murmur  Resp: CTAB  Ext- DPP-B, no clubbing, cyanosis, edema    Impression/Plan:   1) HTN- stable  2) prediabetes- stable  3) HM- UTD      Attending Physician Statement  I have discussed the case, including pertinent history and exam findings with the resident. I agree with the documented assessment and plan.       Mireille Glass, DO

## 2022-02-23 DIAGNOSIS — J45.40 MODERATE PERSISTENT ASTHMA WITHOUT COMPLICATION: ICD-10-CM

## 2022-02-23 RX ORDER — FLUTICASONE PROPIONATE 100 MCG
BLISTER, WITH INHALATION DEVICE INHALATION
Qty: 60 EACH | Refills: 3 | Status: SHIPPED
Start: 2022-02-23 | End: 2022-06-21 | Stop reason: SDUPTHER

## 2022-03-04 DIAGNOSIS — J45.40 MODERATE PERSISTENT ASTHMA WITHOUT COMPLICATION: ICD-10-CM

## 2022-03-09 DIAGNOSIS — I10 RESISTANT HYPERTENSION: ICD-10-CM

## 2022-03-09 RX ORDER — HYDRALAZINE HYDROCHLORIDE 25 MG/1
TABLET, FILM COATED ORAL
Qty: 90 TABLET | OUTPATIENT
Start: 2022-03-09

## 2022-03-23 DIAGNOSIS — I10 RESISTANT HYPERTENSION: ICD-10-CM

## 2022-03-23 RX ORDER — CARVEDILOL 25 MG/1
TABLET ORAL
Qty: 180 TABLET | Refills: 1 | Status: SHIPPED
Start: 2022-03-23 | End: 2022-08-24 | Stop reason: SDUPTHER

## 2022-05-16 DIAGNOSIS — J45.40 MODERATE PERSISTENT ASTHMA WITHOUT COMPLICATION: ICD-10-CM

## 2022-05-31 DIAGNOSIS — I10 RESISTANT HYPERTENSION: ICD-10-CM

## 2022-05-31 RX ORDER — LISINOPRIL 40 MG/1
40 TABLET ORAL DAILY
Qty: 30 TABLET | Refills: 0 | Status: SHIPPED
Start: 2022-05-31 | End: 2022-06-21 | Stop reason: SDUPTHER

## 2022-06-11 DIAGNOSIS — I10 RESISTANT HYPERTENSION: ICD-10-CM

## 2022-06-13 DIAGNOSIS — M1A.0710 CHRONIC GOUT OF RIGHT FOOT, UNSPECIFIED CAUSE: ICD-10-CM

## 2022-06-13 RX ORDER — ALLOPURINOL 100 MG/1
TABLET ORAL
Qty: 180 TABLET | Refills: 1 | Status: SHIPPED
Start: 2022-06-13 | End: 2022-06-21 | Stop reason: SDUPTHER

## 2022-06-13 NOTE — TELEPHONE ENCOUNTER
Last Appointment:  12/8/2021  Future Appointments   Date Time Provider Oma Vizcaino   6/21/2022  1:30 PM Neville Galeazzi, MD Hildegarde Ida [facility]

## 2022-06-13 NOTE — TELEPHONE ENCOUNTER
Last Appointment:  12/8/2021  Future Appointments   Date Time Provider Oma Vizcaino   6/21/2022  1:30 PM MD Suresh Acosta [facility]

## 2022-06-14 RX ORDER — HYDROCHLOROTHIAZIDE 25 MG/1
25 TABLET ORAL DAILY
Qty: 7 TABLET | Refills: 0 | Status: SHIPPED
Start: 2022-06-14 | End: 2022-06-21 | Stop reason: SDUPTHER

## 2022-06-14 RX ORDER — AMLODIPINE BESYLATE 10 MG/1
10 TABLET ORAL DAILY
Qty: 7 TABLET | Refills: 0 | Status: SHIPPED
Start: 2022-06-14 | End: 2022-06-21 | Stop reason: SDUPTHER

## 2022-06-16 ENCOUNTER — HOSPITAL ENCOUNTER (EMERGENCY)
Age: 62
Discharge: HOME | End: 2022-06-16
Payer: MEDICARE

## 2022-06-16 VITALS
OXYGEN SATURATION: 98 % | TEMPERATURE: 98.3 F | HEART RATE: 73 BPM | DIASTOLIC BLOOD PRESSURE: 71 MMHG | SYSTOLIC BLOOD PRESSURE: 117 MMHG | RESPIRATION RATE: 16 BRPM

## 2022-06-16 DIAGNOSIS — K08.89 PAIN, DENTAL: ICD-10-CM

## 2022-06-16 DIAGNOSIS — K04.7 DENTAL INFECTION: Primary | ICD-10-CM

## 2022-06-16 PROCEDURE — 6360000002 HC RX W HCPCS: Performed by: PHYSICIAN ASSISTANT

## 2022-06-16 PROCEDURE — 99284 EMERGENCY DEPT VISIT MOD MDM: CPT

## 2022-06-16 PROCEDURE — 96372 THER/PROPH/DIAG INJ SC/IM: CPT

## 2022-06-16 PROCEDURE — 6370000000 HC RX 637 (ALT 250 FOR IP): Performed by: PHYSICIAN ASSISTANT

## 2022-06-16 RX ORDER — PREDNISONE 20 MG/1
20 TABLET ORAL 2 TIMES DAILY
Qty: 10 TABLET | Refills: 0 | Status: SHIPPED | OUTPATIENT
Start: 2022-06-16 | End: 2022-06-21

## 2022-06-16 RX ORDER — KETOROLAC TROMETHAMINE 30 MG/ML
30 INJECTION, SOLUTION INTRAMUSCULAR; INTRAVENOUS ONCE
Status: COMPLETED | OUTPATIENT
Start: 2022-06-16 | End: 2022-06-16

## 2022-06-16 RX ORDER — CLINDAMYCIN HYDROCHLORIDE 300 MG/1
CAPSULE ORAL
COMMUNITY
Start: 2022-06-15 | End: 2022-10-17

## 2022-06-16 RX ORDER — OXYCODONE HYDROCHLORIDE AND ACETAMINOPHEN 5; 325 MG/1; MG/1
1 TABLET ORAL EVERY 8 HOURS PRN
Qty: 6 TABLET | Refills: 0 | Status: SHIPPED | OUTPATIENT
Start: 2022-06-16 | End: 2022-06-18

## 2022-06-16 RX ORDER — OXYCODONE HYDROCHLORIDE AND ACETAMINOPHEN 5; 325 MG/1; MG/1
1 TABLET ORAL ONCE
Status: COMPLETED | OUTPATIENT
Start: 2022-06-16 | End: 2022-06-16

## 2022-06-16 RX ORDER — PREDNISONE 20 MG/1
60 TABLET ORAL ONCE
Status: COMPLETED | OUTPATIENT
Start: 2022-06-16 | End: 2022-06-16

## 2022-06-16 RX ADMIN — OXYCODONE AND ACETAMINOPHEN 1 TABLET: 5; 325 TABLET ORAL at 17:09

## 2022-06-16 RX ADMIN — KETOROLAC TROMETHAMINE 30 MG: 30 INJECTION, SOLUTION INTRAMUSCULAR; INTRAVENOUS at 17:09

## 2022-06-16 RX ADMIN — PREDNISONE 60 MG: 20 TABLET ORAL at 17:09

## 2022-06-16 NOTE — ED PROVIDER NOTES
[address]  Department of Emergency Medicine   ED  Encounter Note  Admit Date/RoomTime: 2022  5:05 PM  ED Room: [place] [address]    NAME: Eric Pastor  : 1960  MRN: [id number]     Chief Complaint:  Dental Pain (lower back tooth pain on going for 7 days, hurts when he swallows, went to dentist yesterday being treated with antibiotics )    History of Present Illness        Celestino Ceballos is a 62 y.o. old male who presents to the emergency department by private vehicle, for non-traumatic left lower last molar pain, which occured 7 day(s) prior to arrival.  Since onset the symptoms have been persistent and mild in severity. Worsened by  cold liquids and chewing and improved by heat. Associated Signs & Symptoms:  facial pain left. Patient states that he called 7 days ago was finally able to get into the dentist yesterday. Patient states he started clindamycin has been using the swish and spit as well as Tylenol alternated with ibuprofen. Patient states he cannot eat because of the pain and is just here for a little bit more relief and something for the swelling. Onset:       Spontaneous:   yes. Following Trauma:   no.     Previous Caries:   yes. Recent Dental Procedure:   no.     ROS   Pertinent positives and negatives are stated within HPI, all other systems reviewed and are negative. Past Medical History:  has a past medical history of Asthma, Chronic back pain, COPD (chronic obstructive pulmonary disease) ([facility] [address] Depression, Gout, Headache, Hypertension, and Osteoarthritis. Surgical History:  has a past surgical history that includes Colonoscopy () and Colonoscopy (2017). Social History:  reports that he has never smoked. He has never used smokeless tobacco. He reports that he does not drink alcohol and does not use drugs.     Family History: family history includes Arthritis in his brother, father, maternal grandfather, maternal grandmother, mother, and sister; Asthma in his brother and sister; Cancer in his maternal grandfather and maternal grandmother; Cancer (age of onset: 80) in his paternal grandmother; Diabetes in his brother, mother, and sister; High Blood Pressure in his brother, father, maternal grandmother, mother, and sister; High Cholesterol in his brother, father, maternal grandmother, and sister. Allergies: Patient has no known allergies. Physical Exam   Oxygen Saturation Interpretation: Normal.        ED Triage Vitals   BP Temp Temp src Heart Rate Resp SpO2 Height Weight   06/16/22 1703 06/16/22 1655 -- 06/16/22 1706 06/16/22 1703 06/16/22 1655 -- --   117/71 98.3 °F (36.8 °C)  73 16 98 %           · Constitutional:  Alert, development consistent with age. · HEENT:  NC/NT. Airway patent. · Neck:  Supple. Normal ROM. · Lips:  upper and lower normal.  · Mouth:  normal tongue and buccal mucosa. · Dental:  Tooth 17 painful to touch mild swelling noted no evidence of apical abscess. No evidence of Man's angina. Patient can open and close mouth with ease. No evidence of retropharyngeal or peritonsillar abscess. No evidence of mastoiditis or parotiditis. Trismus: No.         Drooling: No.           Airway stridor: No.  · Facial skin: mild left swelling. · Respiratory:  Clear to auscultation and breath sounds equal.    · CV: Regular rate and rhythm, normal heart sounds, without pathological murmurs, ectopy, gallops, or rubs. · Skin:  No rashes, erythema or lesions present, unless noted elsewhere. .  · Lymphatics: No lymphangitis or adenopathy noted. · Neurological:  Oriented. Motor functions intact. Lab / Imaging Results   (All laboratory and radiology results have been personally reviewed by myself)  Labs:  No results found for this visit on 06/16/22. Imaging: All Radiology results interpreted by Radiologist unless otherwise noted.   No orders to display     ED Course / Medical Decision Making     Medications   ketorolac (TORADOL) injection 30 mg (30 mg IntraMUSCular Given 6/16/22 1709)   oxyCODONE-acetaminophen (PERCOCET) 5-325 MG per tablet 1 tablet (1 tablet Oral Given 6/16/22 1709)   predniSONE (DELTASONE) tablet 60 mg (60 mg Oral Given 6/16/22 1709)        Consult(s):   Dental Resident was not consulted to see patient regarding complaint. Procedure(s):   None    MDM: Patient is a 61-year-old male that is presenting for dental pain. Patient states that he did see a dentist yesterday and is been placed on clindamycin as well as a swish and spit. Patient states the pain is persisted and he knows is only been 1 day but is having difficulty time eating and has some swelling. Patient had thorough head, ears, nose examinations. Airway is completely patent. There is no evidence of a retropharyngeal or peritonsillar abscess. Patient can open and close mouth with ease and can still talk. No evidence of Man's angina, trismus, mastoiditis or parotiditis. Patient was given Toradol, Percocet and some steroids here. Patient's prescriptions were sent to the pharmacy. Patient was told that he will be given a few Percocet pills to take as needed and he cannot drive on this medication. He was explained it causes constipation. Patient was told that he needs to follow-up with a dentist on Monday as scheduled and continue his antibiotics to have improvement. Patient was advised of worsening signs and symptoms and when to return back to the emergency department. Patient voiced understanding and agreed with the plan management is vitally stable and airways patent and  was used the entire time and patient fully understands plan and management. Patient was explicitly instructed on specific signs and symptoms on which to return to the emergency room for. Patient was instructed to return to the ER for any new or worsening symptoms.  Additional discharge instructions were given verbally. All questions were answered. Patient is comfortable and agreeable with discharge plan. Patient in no acute distress and non-toxic in appearance. Plan of Care/Counseling:  Fela Browning PA-C reviewed today's visit with the patient in addition to providing specific details for the plan of care and counseling regarding the diagnosis and prognosis. Questions are answered at this time and are agreeable with the plan . Assessment      1. Dental infection    2. Pain, dental      Plan   Discharged home. Patient condition is stable    New Medications     New Prescriptions    OXYCODONE-ACETAMINOPHEN (PERCOCET) 5-325 MG PER TABLET    Take 1 tablet by mouth every 8 hours as needed for Pain for up to 2 days. Intended supply: 3 days. Take lowest dose possible to manage pain    PREDNISONE (DELTASONE) 20 MG TABLET    Take 1 tablet by mouth 2 times daily for 5 days     Electronically signed by Fela Browning PA-C   DD: 6/16/22  **This report was transcribed using voice recognition software. Every effort was made to ensure accuracy; however, inadvertent computerized transcription errors may be present.   END OF ED PROVIDER NOTE        Fela Browning PA-C  06/16/22 [id number] [address]  06/16/22 [id number]

## 2022-06-16 NOTE — Clinical Note
Celestino Oconnell was seen and treated in our emergency department on 6/16/2022. He may return to work on 06/18/2022. If you have any questions or concerns, please don't hesitate to call.       Melissa Fuchs PA-C

## 2022-06-19 ENCOUNTER — APPOINTMENT (OUTPATIENT)
Dept: CT IMAGING | Age: 62
End: 2022-06-19
Payer: MEDICARE

## 2022-06-19 ENCOUNTER — APPOINTMENT (OUTPATIENT)
Dept: GENERAL RADIOLOGY | Age: 62
End: 2022-06-19
Payer: MEDICARE

## 2022-06-19 DIAGNOSIS — I10 RESISTANT HYPERTENSION: ICD-10-CM

## 2022-06-19 LAB
ALBUMIN SERPL-MCNC: 4 G/DL (ref 3.5–5.2)
ALP BLD-CCNC: 73 U/L (ref 40–129)
ALT SERPL-CCNC: 27 U/L (ref 0–40)
ANION GAP SERPL CALCULATED.3IONS-SCNC: 13 MMOL/L (ref 7–16)
AST SERPL-CCNC: 28 U/L (ref 0–39)
BACTERIA: ABNORMAL /HPF
BASOPHILS ABSOLUTE: 0.02 E9/L (ref 0–0.2)
BASOPHILS RELATIVE PERCENT: 0.1 % (ref 0–2)
BILIRUB SERPL-MCNC: 0.8 MG/DL (ref 0–1.2)
BILIRUBIN URINE: NEGATIVE
BLOOD, URINE: ABNORMAL
BUN BLDV-MCNC: 23 MG/DL (ref 6–23)
CALCIUM SERPL-MCNC: 8.6 MG/DL (ref 8.6–10.2)
CHLORIDE BLD-SCNC: 99 MMOL/L (ref 98–107)
CLARITY: ABNORMAL
CO2: 24 MMOL/L (ref 22–29)
COLOR: YELLOW
CREAT SERPL-MCNC: 0.8 MG/DL (ref 0.7–1.2)
EOSINOPHILS ABSOLUTE: 0.05 E9/L (ref 0.05–0.5)
EOSINOPHILS RELATIVE PERCENT: 0.3 % (ref 0–6)
EPITHELIAL CELLS, UA: ABNORMAL /HPF
GFR AFRICAN AMERICAN: >60
GFR NON-AFRICAN AMERICAN: >60 ML/MIN/1.73
GLUCOSE BLD-MCNC: 153 MG/DL (ref 74–99)
GLUCOSE URINE: NEGATIVE MG/DL
HCT VFR BLD CALC: 40 % (ref 37–54)
HEMOGLOBIN: 13.4 G/DL (ref 12.5–16.5)
IMMATURE GRANULOCYTES #: 0.1 E9/L
IMMATURE GRANULOCYTES %: 0.6 % (ref 0–5)
INFLUENZA A BY PCR: NOT DETECTED
INFLUENZA B BY PCR: NOT DETECTED
KETONES, URINE: NEGATIVE MG/DL
LACTIC ACID: 0.9 MMOL/L (ref 0.5–2.2)
LEUKOCYTE ESTERASE, URINE: ABNORMAL
LIPASE: 20 U/L (ref 13–60)
LYMPHOCYTES ABSOLUTE: 1.8 E9/L (ref 1.5–4)
LYMPHOCYTES RELATIVE PERCENT: 10.6 % (ref 20–42)
MCH RBC QN AUTO: 28.9 PG (ref 26–35)
MCHC RBC AUTO-ENTMCNC: 33.5 % (ref 32–34.5)
MCV RBC AUTO: 86.4 FL (ref 80–99.9)
MONOCYTES ABSOLUTE: 1.18 E9/L (ref 0.1–0.95)
MONOCYTES RELATIVE PERCENT: 6.9 % (ref 2–12)
NEUTROPHILS ABSOLUTE: 13.83 E9/L (ref 1.8–7.3)
NEUTROPHILS RELATIVE PERCENT: 81.5 % (ref 43–80)
NITRITE, URINE: POSITIVE
PDW BLD-RTO: 12.4 FL (ref 11.5–15)
PH UA: 6 (ref 5–9)
PLATELET # BLD: 243 E9/L (ref 130–450)
PMV BLD AUTO: 9.6 FL (ref 7–12)
POTASSIUM REFLEX MAGNESIUM: 3.7 MMOL/L (ref 3.5–5)
PROTEIN UA: 30 MG/DL
RBC # BLD: 4.63 E12/L (ref 3.8–5.8)
RBC UA: >20 /HPF (ref 0–2)
SARS-COV-2, NAAT: NOT DETECTED
SODIUM BLD-SCNC: 136 MMOL/L (ref 132–146)
SPECIFIC GRAVITY UA: >=1.03 (ref 1–1.03)
TOTAL PROTEIN: 7.2 G/DL (ref 6.4–8.3)
UROBILINOGEN, URINE: 1 E.U./DL
WBC # BLD: 17 E9/L (ref 4.5–11.5)
WBC UA: ABNORMAL /HPF (ref 0–5)

## 2022-06-19 PROCEDURE — 83605 ASSAY OF LACTIC ACID: CPT

## 2022-06-19 PROCEDURE — 87186 SC STD MICRODIL/AGAR DIL: CPT

## 2022-06-19 PROCEDURE — 71046 X-RAY EXAM CHEST 2 VIEWS: CPT

## 2022-06-19 PROCEDURE — 6370000000 HC RX 637 (ALT 250 FOR IP): Performed by: PHYSICIAN ASSISTANT

## 2022-06-19 PROCEDURE — 80053 COMPREHEN METABOLIC PANEL: CPT

## 2022-06-19 PROCEDURE — 99284 EMERGENCY DEPT VISIT MOD MDM: CPT

## 2022-06-19 PROCEDURE — 85025 COMPLETE CBC W/AUTO DIFF WBC: CPT

## 2022-06-19 PROCEDURE — 87635 SARS-COV-2 COVID-19 AMP PRB: CPT

## 2022-06-19 PROCEDURE — 83690 ASSAY OF LIPASE: CPT

## 2022-06-19 PROCEDURE — 87502 INFLUENZA DNA AMP PROBE: CPT

## 2022-06-19 PROCEDURE — 87088 URINE BACTERIA CULTURE: CPT

## 2022-06-19 PROCEDURE — 81001 URINALYSIS AUTO W/SCOPE: CPT

## 2022-06-19 PROCEDURE — 74176 CT ABD & PELVIS W/O CONTRAST: CPT

## 2022-06-19 RX ORDER — ACETAMINOPHEN 325 MG/1
650 TABLET ORAL ONCE
Status: COMPLETED | OUTPATIENT
Start: 2022-06-19 | End: 2022-06-19

## 2022-06-19 RX ORDER — OXYCODONE AND ACETAMINOPHEN 10; 325 MG/1; MG/1
1 TABLET ORAL ONCE
Status: DISCONTINUED | OUTPATIENT
Start: 2022-06-19 | End: 2022-06-19

## 2022-06-19 RX ORDER — 0.9 % SODIUM CHLORIDE 0.9 %
1000 INTRAVENOUS SOLUTION INTRAVENOUS ONCE
Status: DISCONTINUED | OUTPATIENT
Start: 2022-06-19 | End: 2022-06-19

## 2022-06-19 RX ADMIN — ACETAMINOPHEN 650 MG: 325 TABLET, FILM COATED ORAL at 23:00

## 2022-06-20 ENCOUNTER — HOSPITAL ENCOUNTER (EMERGENCY)
Age: 62
Discharge: HOME | End: 2022-06-20
Attending: EMERGENCY MEDICINE
Payer: MEDICARE

## 2022-06-20 VITALS
RESPIRATION RATE: 16 BRPM | WEIGHT: 160 LBS | TEMPERATURE: 98.8 F | OXYGEN SATURATION: 96 % | BODY MASS INDEX: 28.35 KG/M2 | SYSTOLIC BLOOD PRESSURE: 120 MMHG | HEART RATE: 69 BPM | HEIGHT: 63 IN | DIASTOLIC BLOOD PRESSURE: 70 MMHG

## 2022-06-20 DIAGNOSIS — N30.01 ACUTE CYSTITIS WITH HEMATURIA: Primary | ICD-10-CM

## 2022-06-20 PROCEDURE — 6370000000 HC RX 637 (ALT 250 FOR IP): Performed by: EMERGENCY MEDICINE

## 2022-06-20 RX ORDER — AMLODIPINE BESYLATE 10 MG/1
10 TABLET ORAL DAILY
Qty: 7 TABLET | Refills: 0 | OUTPATIENT
Start: 2022-06-20

## 2022-06-20 RX ORDER — HYDROCHLOROTHIAZIDE 25 MG/1
TABLET ORAL
Qty: 7 TABLET | Refills: 0 | OUTPATIENT
Start: 2022-06-20

## 2022-06-20 RX ORDER — CEFDINIR 300 MG/1
300 CAPSULE ORAL 2 TIMES DAILY
Qty: 20 CAPSULE | Refills: 0 | Status: SHIPPED | OUTPATIENT
Start: 2022-06-20 | End: 2022-06-30

## 2022-06-20 RX ORDER — CEFDINIR 300 MG/1
300 CAPSULE ORAL ONCE
Status: COMPLETED | OUTPATIENT
Start: 2022-06-20 | End: 2022-06-20

## 2022-06-20 RX ADMIN — CEFDINIR 300 MG: 300 CAPSULE ORAL at 01:22

## 2022-06-20 ASSESSMENT — PAIN - FUNCTIONAL ASSESSMENT: PAIN_FUNCTIONAL_ASSESSMENT: NONE - DENIES PAIN

## 2022-06-20 NOTE — ED PROVIDER NOTES
Department of Emergency Medicine   ED  Provider Note  Admit Date/RoomTime: 6/20/2022 12:44 AM  [facility]          History of Present Illness:  6/20/22, Time: 1:35 AM EDT  Chief Complaint   Patient presents with    Fever    Headache      since yesterday                Dona Sheikh is a 62 y.o. male presenting to the ED for dysuria. Patient states he is having dysuria for since yesterday. He states he has a burning sensation when he urinates, nothing makes it better or worse, he tried nothing at home for relief. Initial triage note states the patient's had fevers and headaches. He states he does not have a headache, he says he is only here for his urinary symptoms. He does mention he was recently seen for a dental infection, he was started on prednisone along with clindamycin, although he does no longer taking the clindamycin. He states his dental infection has cleared up, and is still on prednisone. Is only concerned about his dysuria at this time. Denies any neck pain or stiffness. Denies any nausea, vomit, paresthesias, photophobia, confusion, lethargy, or any other symptoms or complaints. Review of Systems:   Pertinent positives and negatives are stated within HPI, all other systems reviewed and are negative.        --------------------------------------------- PAST HISTORY ---------------------------------------------  Past Medical History:  has a past medical history of Asthma, Chronic back pain, COPD (chronic obstructive pulmonary disease) ([facility] [address] Depression, Gout, Headache, Hypertension, and Osteoarthritis. Past Surgical History:  has a past surgical history that includes Colonoscopy (2000) and Colonoscopy (04/13/2017). Social History:  reports that he has never smoked. He has never used smokeless tobacco. He reports that he does not drink alcohol and does not use drugs.     Family History: family history includes Arthritis in his brother, father, maternal grandfather, maternal grandmother, mother, and sister; Asthma in his brother and sister; Cancer in his maternal grandfather and maternal grandmother; Cancer (age of onset: 80) in his paternal grandmother; Diabetes in his brother, mother, and sister; High Blood Pressure in his brother, father, maternal grandmother, mother, and sister; High Cholesterol in his brother, father, maternal grandmother, and sister. . Unless otherwise noted, family history is non contributory    The patients home medications have been reviewed. Allergies: Patient has no known allergies. ---------------------------------------------------PHYSICAL EXAM--------------------------------------    Constitutional/General: Alert and oriented x3  Head: Normocephalic and atraumatic  Eyes: PERRL, EOMI, sclera non icteric  Mouth: Oropharynx clear, handling secretions, no trismus, no asymmetry of the posterior oropharynx or uvular edema  Neck: Supple, full ROM, no stridor, no meningeal signs, no nuchal rigidity  Respiratory: Lungs clear to auscultation bilaterally, no wheezes, rales, or rhonchi. Not in respiratory distress  Cardiovascular:  Regular rate. Regular rhythm. 2+ distal pulses. Equal extremity pulses. Chest: No chest wall tenderness  GI:  Abdomen Soft, Non tender, Non distended. No rebound, guarding, or rigidity. No pulsatile masses. Nonacute abdomen  Musculoskeletal: Moves all extremities x 4. Warm and well perfused, no clubbing, cyanosis, or edema. Capillary refill <3 seconds  Integument: skin warm and dry. No rashes. Neurologic: GCS 15, no focal deficits, symmetric strength 5/5 in the upper and lower extremities bilaterally, NIH is 0  Psychiatric: Normal Affect          -------------------------------------------------- RESULTS -------------------------------------------------  I have personally reviewed all laboratory and imaging results for this patient. Results are listed below.      LABS: (Lab results interpreted by me)  Results for orders placed or performed during the hospital encounter of 06/20/22   COVID-19, Rapid    Specimen: Nasopharyngeal Swab   Result Value Ref Range    SARS-CoV-2, NAAT Not Detected Not Detected   Rapid influenza A/B antigens    Specimen: Nasopharyngeal   Result Value Ref Range    Influenza A by PCR Not Detected Not Detected    Influenza B by PCR Not Detected Not Detected   CBC with Auto Differential   Result Value Ref Range    WBC 17.0 (H) 4.5 - 11.5 E9/L    RBC 4.63 3.80 - 5.80 E12/L    Hemoglobin 13.4 12.5 - 16.5 g/dL    Hematocrit 40.0 37.0 - 54.0 %    MCV 86.4 80.0 - 99.9 fL    MCH 28.9 26.0 - 35.0 pg    MCHC 33.5 32.0 - 34.5 %    RDW 12.4 11.5 - 15.0 fL    Platelets 319 498 - 829 E9/L    MPV 9.6 7.0 - 12.0 fL    Neutrophils % 81.5 (H) 43.0 - 80.0 %    Immature Granulocytes % 0.6 0.0 - 5.0 %    Lymphocytes % 10.6 (L) 20.0 - 42.0 %    Monocytes % 6.9 2.0 - 12.0 %    Eosinophils % 0.3 0.0 - 6.0 %    Basophils % 0.1 0.0 - 2.0 %    Neutrophils Absolute 13.83 (H) 1.80 - 7.30 E9/L    Immature Granulocytes # 0.10 E9/L    Lymphocytes Absolute 1.80 1.50 - 4.00 E9/L    Monocytes Absolute 1.18 (H) 0.10 - 0.95 E9/L    Eosinophils Absolute 0.05 0.05 - 0.50 E9/L    Basophils Absolute 0.02 0.00 - 0.20 E9/L   Comprehensive Metabolic Panel w/ Reflex to MG   Result Value Ref Range    Sodium 136 132 - 146 mmol/L    Potassium reflex Magnesium 3.7 3.5 - 5.0 mmol/L    Chloride 99 98 - 107 mmol/L    CO2 24 22 - 29 mmol/L    Anion Gap 13 7 - 16 mmol/L    Glucose 153 (H) 74 - 99 mg/dL    BUN 23 6 - 23 mg/dL    CREATININE 0.8 0.7 - 1.2 mg/dL    GFR Non-African American >60 >=60 mL/min/1.73    GFR African American >60     Calcium 8.6 8.6 - 10.2 mg/dL    Total Protein 7.2 6.4 - 8.3 g/dL    Albumin 4.0 3.5 - 5.2 g/dL    Total Bilirubin 0.8 0.0 - 1.2 mg/dL    Alkaline Phosphatase 73 40 - 129 U/L    ALT 27 0 - 40 U/L    AST 28 0 - 39 U/L   Lipase   Result Value Ref Range    Lipase 20 13 - 60 U/L   Lactic Acid   Result Value Ref Range    Lactic Acid 0.9 0.5 - 2.2 mmol/L   Urinalysis   Result Value Ref Range    Color, UA Yellow Straw/Yellow    Clarity, UA CLOUDY (A) Clear    Glucose, Ur Negative Negative mg/dL    Bilirubin Urine Negative Negative    Ketones, Urine Negative Negative mg/dL    Specific Gravity, UA >=1.030 1.005 - 1.030    Blood, Urine LARGE (A) Negative    pH, UA 6.0 5.0 - 9.0    Protein, UA 30 (A) Negative mg/dL    Urobilinogen, Urine 1.0 <2.0 E.U./dL    Nitrite, Urine POSITIVE (A) Negative    Leukocyte Esterase, Urine MODERATE (A) Negative   Microscopic Urinalysis   Result Value Ref Range    WBC, UA 10-20 (A) 0 - 5 /HPF    RBC, UA >20 0 - 2 /HPF    Epithelial Cells, UA RARE /HPF    Bacteria, UA MANY (A) None Seen /HPF   ,       RADIOLOGY:  Interpreted by Radiologist unless otherwise specified  CT ABDOMEN PELVIS WO CONTRAST Additional Contrast? None   Final Result   Findings suspicious for acute infectious/inflammatory cystitis. Fat stranding around the bilateral kidneys, nonspecific. Acute   pyelonephritis cannot be excluded. No radiopaque urolithiasis or hydroureteronephrosis on either side. Enlarged prostate. Correlation with rectal exam and serum PSA levels   recommended. Diffuse hepatic steatosis. Distal colonic diverticula. No acute diverticulitis. XR CHEST (2 VW)   Final Result   No acute process. EKG Interpretation  Interpreted by emergency department physician, Dr. Ilya Ortega         ------------------------- NURSING NOTES AND VITALS REVIEWED ---------------------------   The nursing notes within the ED encounter and vital signs as below have been reviewed by myself  /70   Pulse 69   Temp 98.8 °F (37.1 °C) (Oral)   Resp 16   Ht 5' 3\" (1.6 m)   Wt 160 lb (72.6 kg)   SpO2 96%   BMI 28.34 kg/m²     Oxygen Saturation Interpretation: Normal    The patients available past medical records and past encounters were reviewed.         ------------------------------ ED COURSE/MEDICAL DECISION MAKING----------------------  Medications   acetaminophen (TYLENOL) tablet 650 mg (650 mg Oral Given 6/19/22 2300)   cefdinir (OMNICEF) capsule 300 mg (300 mg Oral Given 6/20/22 0122)             Medical Decision Making:    Labs and imaging reviewed. Patient does have a leukocytosis, likely component of his prednisone use. Reevaluation, patient's resting comfortably. No symptoms or complaints other than dysuria. Once again, he denies any headache, he states he never had a headache. Denies neck pain or stiffness. He has no nuchal rigidity, no photophobia, NIH is 0, no focal neurological deficits. Denies any weakness or numbness. Denies any altered state. Family bedside, he is at his baseline mental status. He otherwise feels fine. Repeat abdominal examination does not indicate a surgical abdomen. He states he feels improved and like to go home. Given this, and the overall presentation, patient will be discharged, he will be started on Omnicef, he is educated on signs and symptoms require emergent evaluation. He and his son who are bedside are also educated on his enlarged prostate, and will need follow-up, they voiced understanding. Counseling: The emergency provider has spoken with the patient and discussed todays results, in addition to providing specific details for the plan of care and counseling regarding the diagnosis and prognosis. Questions are answered at this time and they are agreeable with the plan.       --------------------------------- IMPRESSION AND DISPOSITION ---------------------------------    IMPRESSION  1. Acute cystitis with hematuria        DISPOSITION  Disposition: Discharge to home  Patient condition is stable        NOTE: This report was transcribed using voice recognition software.  Every effort was made to ensure accuracy; however, inadvertent computerized transcription errors may be present        Nisha Bond MD  06/20/22 9202 [facility], [place]  06/20/22 0150

## 2022-06-20 NOTE — ED NOTES
Department of Emergency Medicine  FIRST PROVIDER TRIAGE NOTE             Independent MLP           6/19/22  8:21 PM EDT    Date of Encounter: 6/19/22   MRN: [id number]      HPI: Eugene Panda is a 62 y.o. male who presents to the ED for Fever and Headache ( since yesterday)   Patient was recently treated for dental infection. States progressively feeling worse. He  Complains of headache congestion cough abdominal discomfort urinary frequency. Fever   Patient is on clindamycin       ROS: Negative for  Chest pain sob     PE: Gen Appearance/Constitutional: alert  CV: regular rate  Pulm: CTA bilat     Initial Plan of Care: All treatment areas with department are currently occupied. Plan to order/Initiate the following while awaiting opening in ED: labs.   Initiate Treatment-Testing, Proceed toTreatment Area When Bed Available for ED Attending/MLP to Continue Care    Electronically signed by ROCHELLE Hernadez   DD: 6/19/22       ROCHLELE Hernadez  06/19/22 2028       ROCHELLE Hernadez  06/19/22 [id number]

## 2022-06-21 ENCOUNTER — OFFICE VISIT (OUTPATIENT)
Dept: FAMILY MEDICINE CLINIC | Age: 62
End: 2022-06-21
Payer: MEDICARE

## 2022-06-21 VITALS
DIASTOLIC BLOOD PRESSURE: 70 MMHG | HEART RATE: 72 BPM | BODY MASS INDEX: 28.88 KG/M2 | TEMPERATURE: 98.4 F | OXYGEN SATURATION: 95 % | HEIGHT: 63 IN | WEIGHT: 163 LBS | SYSTOLIC BLOOD PRESSURE: 122 MMHG

## 2022-06-21 DIAGNOSIS — Z00.00 HEALTH CARE MAINTENANCE: Primary | ICD-10-CM

## 2022-06-21 DIAGNOSIS — J45.40 MODERATE PERSISTENT ASTHMA WITHOUT COMPLICATION: ICD-10-CM

## 2022-06-21 DIAGNOSIS — Z00.00 HEALTH CARE MAINTENANCE: ICD-10-CM

## 2022-06-21 DIAGNOSIS — M1A.0710 CHRONIC GOUT OF RIGHT FOOT, UNSPECIFIED CAUSE: ICD-10-CM

## 2022-06-21 DIAGNOSIS — I10 RESISTANT HYPERTENSION: ICD-10-CM

## 2022-06-21 PROCEDURE — 99213 OFFICE O/P EST LOW 20 MIN: CPT

## 2022-06-21 PROCEDURE — G8417 CALC BMI ABV UP PARAM F/U: HCPCS

## 2022-06-21 PROCEDURE — 1036F TOBACCO NON-USER: CPT

## 2022-06-21 PROCEDURE — 36415 COLL VENOUS BLD VENIPUNCTURE: CPT

## 2022-06-21 PROCEDURE — 99212 OFFICE O/P EST SF 10 MIN: CPT

## 2022-06-21 PROCEDURE — 3017F COLORECTAL CA SCREEN DOC REV: CPT

## 2022-06-21 PROCEDURE — G8427 DOCREV CUR MEDS BY ELIG CLIN: HCPCS

## 2022-06-21 RX ORDER — HYDROCHLOROTHIAZIDE 25 MG/1
25 TABLET ORAL DAILY
Qty: 7 TABLET | Refills: 0 | Status: SHIPPED
Start: 2022-06-21 | End: 2022-06-28

## 2022-06-21 RX ORDER — LISINOPRIL 40 MG/1
40 TABLET ORAL DAILY
Qty: 30 TABLET | Refills: 0 | Status: SHIPPED
Start: 2022-06-21 | End: 2022-07-21

## 2022-06-21 RX ORDER — CEFDINIR 300 MG/1
300 CAPSULE ORAL 2 TIMES DAILY
Qty: 20 CAPSULE | Refills: 0 | Status: CANCELLED | OUTPATIENT
Start: 2022-06-21 | End: 2022-07-01

## 2022-06-21 RX ORDER — ALBUTEROL SULFATE 90 UG/1
AEROSOL, METERED RESPIRATORY (INHALATION)
Qty: 18 G | Refills: 2 | Status: SHIPPED
Start: 2022-06-21 | End: 2022-08-24 | Stop reason: SDUPTHER

## 2022-06-21 RX ORDER — ALLOPURINOL 100 MG/1
TABLET ORAL
Qty: 180 TABLET | Refills: 1 | Status: SHIPPED
Start: 2022-06-21 | End: 2022-08-24 | Stop reason: SDUPTHER

## 2022-06-21 RX ORDER — FLUTICASONE PROPIONATE 100 MCG
BLISTER, WITH INHALATION DEVICE INHALATION
Qty: 60 EACH | Refills: 3 | Status: SHIPPED
Start: 2022-06-21 | End: 2022-10-17 | Stop reason: SDUPTHER

## 2022-06-21 RX ORDER — AMLODIPINE BESYLATE 10 MG/1
10 TABLET ORAL DAILY
Qty: 7 TABLET | Refills: 0 | Status: SHIPPED
Start: 2022-06-21 | End: 2022-06-28

## 2022-06-21 NOTE — PROGRESS NOTES
S: 62 y.o. male with   Chief Complaint   Patient presents with    Hypertension       Patient is a 32-year-old male who comes here today for follow-up on hypertension and UTI. Patient was apparently seen in the emergency department last Sunday for urinary symptoms which include urinary burning, discharge and foul urine. He was prescribed Omnicef for 10 days which he has been taking. Today he denies any urinary symptoms and says that that he no longer presents the urinary burning, incontinence or discomfort while urinating. Patient has had an extensive hyper tensive work-up Done in the past by nephrologist Dr. Juan Jade. Today his blood pressure is stable at 120/70 and he says that he checks blood pressure daily which tends to be in the range of 120//80. Blood pressure is currently controlled on lisinopril, amlodipine, HydroDIURIL, allopurinol. At this point patient denies any headaches, chest pain, palpitations. Last colonoscopy was done in 2017. Up-to-date with vaccines. Needs PSA screening        O: VS:  height is 5' 3\" (1.6 m) and weight is 163 lb (73.9 kg). His temporal temperature is 98.4 °F (36.9 °C). His blood pressure is 122/70 and his pulse is 72. His oxygen saturation is 95%. AAO/NAD, appropriate affect for mood  CV:  RRR, no murmur  Resp: CTAB      Impression/Plan:   1) hypertension follow-up. Blood pressure stable at 120 over 70s today and per patient also at home on daily logs. Continue current regimen which includes amlodipine, lisinopril, hydrochlorothiazide and allopurinol at current doses. We will follow-up with patient in 2 months after he tries to be more compliant with his current-diet and exercise to see if we can reduce any of his medications    2) Healthcare maintenance    UTI follow-up patient denies any current urinary symptoms and says that his past symptoms are resolved at this point.   Urinary culture is still pending from the emergency department visit and he is currently on antibiotics cefdinir. Continue current cefdinir return to clinic or emergency department if he starts to develop any of breath like symptoms such as fever, urinary incontinence, hematuria etc.  Patient understands and agrees with plan    Will order PSA at this visit    Return to clinic in 2 months for hypertension follow-up      Health Maintenance Due   Topic Date Due    Prostate Specific Antigen (PSA) Screening or Monitoring  Never done    Shingles vaccine (1 of 2) Never done    Pneumococcal 0-64 years Vaccine (2 - PCV) 06/23/2018    Depression Screen  02/01/2022          Attending Physician Statement  I have discussed the case, including pertinent history and exam findings with the resident. I agree with the documented assessment and plan.       Mike Starkey MD

## 2022-06-21 NOTE — PROGRESS NOTES
[facility]  FAMILY MEDICINE RESIDENCY PROGRAM  DATE OF VISIT : 2022    Patient : Queta Chaudhry   Age : 62 y.o.  : 1960   MRN : [id number]   ______________________________________________________________________    Chief Complaint:   Chief Complaint   Patient presents with    Hypertension       HPI:   Patient is a 81-year-old male who comes here today for follow-up on hypertension and UTI. Patient was apparently seen in the emergency department last  for urinary symptoms which include urinary burning, discharge and foul urine. He was prescribed Omnicef for 10 days which he has been taking. Today he denies any urinary symptoms and says that that he no longer presents the urinary burning, incontinence or discomfort while urinating. Patient has had an extensive hyper tensive work-up Done in the past by nephrologist Dr. Delila Saint. Today his blood pressure is stable at 120/70 and he says that he checks blood pressure daily which tends to be in the range of 120//80. Blood pressure is currently controlled on lisinopril, amlodipine, HydroDIURIL, allopurinol. At this point patient denies any headaches, chest pain, palpitations.     Last colonoscopy was done in 2017. Up-to-date with vaccines.   Needs PSA screening      Past Medical History:  Past Medical History:   Diagnosis Date    Asthma     Chronic back pain     [facility]     Depression     Gout     Headache     Hypertension     Osteoarthritis        Past Surgical History:  Past Surgical History:   Procedure Laterality Date    [place] for BRBPR    COLONOSCOPY  2017       Family History:  Family History   Problem Relation Age of Onset    Arthritis Mother     Diabetes Mother     High Blood Pressure Mother     Arthritis Father     High Blood Pressure Father     High Cholesterol Father     Arthritis Sister     Asthma Sister    Jayesh Diabetes Sister     High Blood Pressure Sister     High Cholesterol Sister     Arthritis Brother     Asthma Brother     Diabetes Brother     High Blood Pressure Brother     High Cholesterol Brother     Arthritis Maternal Grandmother     Cancer Maternal Grandmother     High Blood Pressure Maternal Grandmother     High Cholesterol Maternal Grandmother     Arthritis Maternal Grandfather     Cancer Maternal Grandfather     Cancer Paternal Grandmother 80        colon cancer       Social History:  Social History     Socioeconomic History    Marital status: Single     Spouse name: Not on file    Number of children: Not on file    Years of education: Not on file    Highest education level: Not on file   Occupational History    Not on file   Tobacco Use    Smoking status: Never Smoker    Smokeless tobacco: Never Used   Substance and Sexual Activity    Alcohol use: No    Drug use: No    Sexual activity: Yes   Other Topics Concern    Not on file   Social History Narrative    Not on file     Social Determinants of Health     Financial Resource Strain: Low Risk     Difficulty of Paying Living Expenses: Not very hard   Food Insecurity: No Food Insecurity    Worried About Running Out of Food in the Last Year: Never true    Urszula of Food in the Last Year: Never true   Transportation Needs:     Lack of Transportation (Medical): Not on file    Lack of Transportation (Non-Medical):  Not on file   Physical Activity:     Days of Exercise per Week: Not on file    Minutes of Exercise per Session: Not on file   Stress:     Feeling of Stress : Not on file   Social Connections:     Frequency of Communication with Friends and Family: Not on file    Frequency of Social Gatherings with Friends and Family: Not on file    Attends Restoration Services: Not on file    Active Member of Clubs or Organizations: Not on file    Attends Club or Organization Meetings: Not on file    Marital Status: Not on file Intimate Partner Violence:     Fear of Current or Ex-Partner: Not on file    Emotionally Abused: Not on file    Physically Abused: Not on file    Sexually Abused: Not on file   Housing Stability:     Unable to Pay for Housing in the Last Year: Not on file    Number of Places Lived in the Last Year: Not on file    Unstable Housing in the Last Year: Not on file       Allergies:   No Known Allergies    Medications:    Current Outpatient Medications   Medication Sig Dispense Refill    amLODIPine (NORVASC) 10 MG tablet Take 1 tablet by mouth daily 7 tablet 0    hydroCHLOROthiazide (HYDRODIURIL) 25 MG tablet Take 1 tablet by mouth daily 7 tablet 0    lisinopril (PRINIVIL;ZESTRIL) 40 MG tablet Take 1 tablet by mouth daily 30 tablet 0    allopurinol (ZYLOPRIM) 100 MG tablet TAKE 1 TABLET BY MOUTH TWICE DAILY 180 tablet 1    albuterol sulfate HFA (VENTOLIN HFA) 108 (90 Base) MCG/ACT inhaler INHALE 2 PUFFS INTO THE LUNGS EVERY 6 HOURS AS NEEDED FOR WHEEZING 18 g 2    fluticasone propionate (FLOVENT DISKUS) 100 MCG/BLIST AEPB inhaler INHALE 1 PUFF INTO THE LUNGS EVERY DAY 60 each 3    cefdinir (OMNICEF) 300 MG capsule Take 1 capsule by mouth 2 times daily for 10 days 20 capsule 0    clindamycin (CLEOCIN) 300 MG capsule TAKE ONE CAPSULE BY MOUTH EVERY 6 HOURS UNTIL ALL TAKEN      carvedilol (COREG) 25 MG tablet TAKE 1 TABLET BY MOUTH TWICE DAILY 180 tablet 1    hydrALAZINE (APRESOLINE) 25 MG tablet TAKE 1 TABLET BY MOUTH THREE TIMES DAILY 270 tablet 1    Magic Mouthwash (MIRACLE MOUTHWASH) Swish and spit 5 mLs 4 times daily as needed for Irritation 240 mL 0    doxazosin (CARDURA) 4 MG tablet TAKE 1 TABLET BY MOUTH EVERY NIGHT 90 tablet 1     No current facility-administered medications for this visit. Review of Systems:  Review of Systems   Constitutional: Negative for chills, diaphoresis, fatigue and fever. HENT: Negative for ear discharge, ear pain, rhinorrhea, sinus pain and sore throat. Eyes: Negative for photophobia, discharge and visual disturbance. Respiratory: Negative for cough, chest tightness, shortness of breath and wheezing. Cardiovascular: Negative for chest pain, palpitations and leg swelling. Gastrointestinal: Negative for abdominal pain, diarrhea and vomiting. Endocrine: Negative for cold intolerance, polyphagia and polyuria. Genitourinary: Negative for dysuria, flank pain, hematuria, penile discharge and testicular pain. Musculoskeletal: Negative for gait problem and myalgias. Skin: Negative for pallor and wound. Neurological: Negative for dizziness, tremors, seizures, syncope, weakness and numbness. Psychiatric/Behavioral: Negative for hallucinations, sleep disturbance and suicidal ideas.     ______________________________________________________________________    Physical Exam:    Vitals: /70   Pulse 72   Temp 98.4 °F (36.9 °C) (Temporal)   Ht 5' 3\" (1.6 m)   Wt 163 lb (73.9 kg)   SpO2 95%   BMI 28.87 kg/m²   Physical Exam  Constitutional:       General: He is not in acute distress. Appearance: Normal appearance. HENT:      Head: Normocephalic and atraumatic. Right Ear: External ear normal.      Left Ear: External ear normal.      Nose: Nose normal. No congestion. Mouth/Throat:      Mouth: Mucous membranes are moist.      Pharynx: Oropharynx is clear. Eyes:      General:         Right eye: No discharge. Left eye: No discharge. Extraocular Movements: Extraocular movements intact. Pupils: Pupils are equal, round, and reactive to light. Cardiovascular:      Rate and Rhythm: Normal rate and regular rhythm. Pulses: Normal pulses. Heart sounds: No murmur heard. No gallop. Pulmonary:      Effort: Pulmonary effort is normal.      Breath sounds: Normal breath sounds. No wheezing, rhonchi or rales. Abdominal:      General: Bowel sounds are normal. There is no distension. Palpations: Abdomen is soft. Tenderness: There is no abdominal tenderness. Musculoskeletal:         General: No swelling, tenderness or signs of injury. Normal range of motion. Cervical back: Normal range of motion and neck supple. Lymphadenopathy:      Cervical: No cervical adenopathy. Skin:     General: Skin is warm. Capillary Refill: Capillary refill takes less than 2 seconds. Coloration: Skin is not jaundiced. Findings: No bruising. Neurological:      General: No focal deficit present. Mental Status: He is alert and oriented to person, place, and time. Mental status is at baseline. Psychiatric:         Mood and Affect: Mood normal.         Behavior: Behavior normal.         Thought Content: Thought content normal.       ______________________________________________________________________    Assessment & Plan:  1. Resistant hypertension  Blood pressure stable at 120 over 70s today and per patient also at home on daily logs. Continue current regimen which includes amlodipine, lisinopril, hydrochlorothiazide and allopurinol at current doses. We will follow-up with patient in 2 months after he tries to be more compliant with his current-diet and exercise to see if we can reduce any of his medications  - amLODIPine (NORVASC) 10 MG tablet; Take 1 tablet by mouth daily  Dispense: 7 tablet; Refill: 0  - hydroCHLOROthiazide (HYDRODIURIL) 25 MG tablet; Take 1 tablet by mouth daily  Dispense: 7 tablet; Refill: 0  - lisinopril (PRINIVIL;ZESTRIL) 40 MG tablet; Take 1 tablet by mouth daily  Dispense: 30 tablet; Refill: 0    2. Chronic gout of right foot, unspecified cause  - allopurinol (ZYLOPRIM) 100 MG tablet; TAKE 1 TABLET BY MOUTH TWICE DAILY  Dispense: 180 tablet; Refill: 1    3.  Moderate persistent asthma without complication  - albuterol sulfate HFA (VENTOLIN HFA) 108 (90 Base) MCG/ACT inhaler; INHALE 2 PUFFS INTO THE LUNGS EVERY 6 HOURS AS NEEDED FOR WHEEZING  Dispense: 18 g; Refill: 2  - fluticasone propionate (FLOVENT DISKUS) 100 MCG/BLIST AEPB inhaler; INHALE 1 PUFF INTO THE LUNGS EVERY DAY  Dispense: 60 each; Refill: 3    4. Health care maintenance  - PSA Screening; Future        Return to Office: Return in about 2 months (around 8/21/2022).     Toma Collazo MD   This case was discussed with Dr. Aliza Hayden

## 2022-06-22 LAB
ORGANISM: ABNORMAL
PROSTATE SPECIFIC ANTIGEN: 7.08 NG/ML (ref 0–4)
URINE CULTURE, ROUTINE: ABNORMAL

## 2022-06-25 ASSESSMENT — ENCOUNTER SYMPTOMS
EYE DISCHARGE: 0
SHORTNESS OF BREATH: 0
CHEST TIGHTNESS: 0
COUGH: 0
PHOTOPHOBIA: 0
DIARRHEA: 0
WHEEZING: 0
SORE THROAT: 0
SINUS PAIN: 0
RHINORRHEA: 0
VOMITING: 0
ABDOMINAL PAIN: 0

## 2022-06-28 DIAGNOSIS — I10 RESISTANT HYPERTENSION: ICD-10-CM

## 2022-06-28 RX ORDER — HYDROCHLOROTHIAZIDE 25 MG/1
25 TABLET ORAL DAILY
Qty: 7 TABLET | Refills: 0 | Status: SHIPPED
Start: 2022-06-28 | End: 2022-07-05

## 2022-06-28 RX ORDER — AMLODIPINE BESYLATE 10 MG/1
10 TABLET ORAL DAILY
Qty: 7 TABLET | Refills: 0 | Status: SHIPPED
Start: 2022-06-28 | End: 2022-07-05

## 2022-06-28 NOTE — TELEPHONE ENCOUNTER
Last Appointment:  6/21/2022  Future Appointments   Date Time Provider Oma Vizcaino   8/24/2022  1:40 PM Annmarie Dave MD [address]

## 2022-07-02 DIAGNOSIS — I10 RESISTANT HYPERTENSION: ICD-10-CM

## 2022-07-05 RX ORDER — HYDROCHLOROTHIAZIDE 25 MG/1
25 TABLET ORAL DAILY
Qty: 7 TABLET | Refills: 0 | Status: SHIPPED
Start: 2022-07-05 | End: 2022-07-11

## 2022-07-05 RX ORDER — AMLODIPINE BESYLATE 10 MG/1
10 TABLET ORAL DAILY
Qty: 7 TABLET | Refills: 0 | Status: SHIPPED
Start: 2022-07-05 | End: 2022-07-11

## 2022-07-09 DIAGNOSIS — I10 RESISTANT HYPERTENSION: ICD-10-CM

## 2022-07-11 RX ORDER — HYDROCHLOROTHIAZIDE 25 MG/1
25 TABLET ORAL DAILY
Qty: 30 TABLET | Refills: 0 | Status: SHIPPED
Start: 2022-07-11 | End: 2022-08-09

## 2022-07-11 RX ORDER — AMLODIPINE BESYLATE 10 MG/1
10 TABLET ORAL DAILY
Qty: 30 TABLET | Refills: 0 | Status: SHIPPED
Start: 2022-07-11 | End: 2022-08-09

## 2022-07-11 NOTE — TELEPHONE ENCOUNTER
Last Appointment:  6/21/2022  Future Appointments   Date Time Provider Oma Vizcaino   8/24/2022  1:40 PM [facility]

## 2022-07-21 DIAGNOSIS — I10 RESISTANT HYPERTENSION: ICD-10-CM

## 2022-07-21 RX ORDER — LISINOPRIL 40 MG/1
40 TABLET ORAL DAILY
Qty: 30 TABLET | Refills: 0 | Status: SHIPPED
Start: 2022-07-21 | End: 2022-08-24 | Stop reason: SDUPTHER

## 2022-07-21 NOTE — TELEPHONE ENCOUNTER
Last Appointment:  6/21/2022  Future Appointments   Date Time Provider Oma Vizcaino   8/24/2022  1:40 PM MD Symone Phillips [facility]

## 2022-08-04 DIAGNOSIS — J45.40 MODERATE PERSISTENT ASTHMA WITHOUT COMPLICATION: ICD-10-CM

## 2022-08-04 RX ORDER — FLUTICASONE PROPIONATE 100 MCG
BLISTER, WITH INHALATION DEVICE INHALATION
Qty: 60 EACH | Refills: 3 | OUTPATIENT
Start: 2022-08-04

## 2022-08-09 DIAGNOSIS — I10 RESISTANT HYPERTENSION: ICD-10-CM

## 2022-08-09 RX ORDER — AMLODIPINE BESYLATE 10 MG/1
10 TABLET ORAL DAILY
Qty: 30 TABLET | Refills: 0 | Status: SHIPPED
Start: 2022-08-09 | End: 2022-08-24 | Stop reason: SDUPTHER

## 2022-08-09 RX ORDER — HYDROCHLOROTHIAZIDE 25 MG/1
25 TABLET ORAL DAILY
Qty: 30 TABLET | Refills: 0 | Status: SHIPPED
Start: 2022-08-09 | End: 2022-08-24 | Stop reason: SDUPTHER

## 2022-08-09 NOTE — TELEPHONE ENCOUNTER
Last Appointment:  6/21/2022  Future Appointments   Date Time Provider Oma Vizcaino   8/24/2022  1:40 PM MD Talia Leong [facility]

## 2022-08-18 DIAGNOSIS — I10 RESISTANT HYPERTENSION: ICD-10-CM

## 2022-08-18 NOTE — TELEPHONE ENCOUNTER
Last Appointment:  6/21/2022  Future Appointments   Date Time Provider Oma Vizcaino   8/24/2022  1:40 PM MD Cheo Ruiz [facility]

## 2022-08-24 ENCOUNTER — OFFICE VISIT (OUTPATIENT)
Dept: FAMILY MEDICINE CLINIC | Age: 62
End: 2022-08-24
Payer: MEDICARE

## 2022-08-24 VITALS
OXYGEN SATURATION: 97 % | DIASTOLIC BLOOD PRESSURE: 60 MMHG | RESPIRATION RATE: 18 BRPM | WEIGHT: 160 LBS | TEMPERATURE: 98.1 F | HEART RATE: 69 BPM | BODY MASS INDEX: 28.35 KG/M2 | HEIGHT: 63 IN | SYSTOLIC BLOOD PRESSURE: 93 MMHG

## 2022-08-24 DIAGNOSIS — M1A.0710 CHRONIC GOUT OF RIGHT FOOT, UNSPECIFIED CAUSE: ICD-10-CM

## 2022-08-24 DIAGNOSIS — I10 RESISTANT HYPERTENSION: ICD-10-CM

## 2022-08-24 DIAGNOSIS — Z87.898 HISTORY OF ELEVATED PSA: Primary | ICD-10-CM

## 2022-08-24 DIAGNOSIS — J45.40 MODERATE PERSISTENT ASTHMA WITHOUT COMPLICATION: ICD-10-CM

## 2022-08-24 DIAGNOSIS — Z87.898 HISTORY OF ELEVATED PSA: ICD-10-CM

## 2022-08-24 LAB — PROSTATE SPECIFIC ANTIGEN: 1.08 NG/ML (ref 0–4)

## 2022-08-24 PROCEDURE — 3017F COLORECTAL CA SCREEN DOC REV: CPT

## 2022-08-24 PROCEDURE — G8427 DOCREV CUR MEDS BY ELIG CLIN: HCPCS

## 2022-08-24 PROCEDURE — 36415 COLL VENOUS BLD VENIPUNCTURE: CPT | Performed by: FAMILY MEDICINE

## 2022-08-24 PROCEDURE — 99213 OFFICE O/P EST LOW 20 MIN: CPT

## 2022-08-24 PROCEDURE — 1036F TOBACCO NON-USER: CPT

## 2022-08-24 PROCEDURE — G8417 CALC BMI ABV UP PARAM F/U: HCPCS

## 2022-08-24 RX ORDER — HYDROCHLOROTHIAZIDE 25 MG/1
25 TABLET ORAL DAILY
Qty: 30 TABLET | Refills: 0 | Status: SHIPPED
Start: 2022-08-24 | End: 2022-10-03

## 2022-08-24 RX ORDER — ALBUTEROL SULFATE 90 UG/1
AEROSOL, METERED RESPIRATORY (INHALATION)
Qty: 18 G | Refills: 2 | Status: SHIPPED
Start: 2022-08-24 | End: 2022-09-16

## 2022-08-24 RX ORDER — ALLOPURINOL 100 MG/1
TABLET ORAL
Qty: 180 TABLET | Refills: 1 | Status: SHIPPED | OUTPATIENT
Start: 2022-08-24

## 2022-08-24 RX ORDER — CARVEDILOL 25 MG/1
TABLET ORAL
Qty: 180 TABLET | Refills: 1 | Status: SHIPPED | OUTPATIENT
Start: 2022-08-24

## 2022-08-24 RX ORDER — LISINOPRIL 40 MG/1
40 TABLET ORAL DAILY
Qty: 30 TABLET | Refills: 0 | Status: SHIPPED
Start: 2022-08-24 | End: 2022-09-09

## 2022-08-24 RX ORDER — HYDRALAZINE HYDROCHLORIDE 25 MG/1
TABLET, FILM COATED ORAL
Qty: 270 TABLET | Refills: 1 | Status: SHIPPED | OUTPATIENT
Start: 2022-08-24

## 2022-08-24 RX ORDER — AMLODIPINE BESYLATE 10 MG/1
10 TABLET ORAL DAILY
Qty: 30 TABLET | Refills: 0 | Status: SHIPPED
Start: 2022-08-24 | End: 2022-10-03

## 2022-08-24 NOTE — PROGRESS NOTES
S: 65 y.o. male who presents for f/u htn. Has had extensive work up in past with nephrology. BP has been well controlled. He takes lisinopril, hctz, hydralazine and amlodipine. No AE from the medications. PMH of asthma that is well controlled on medications. No night time symptoms. PSA test was elevated previously (had a UTI at that time). 2 months ago dx with cystitis and treated with omnicef with full recovery of symptoms. Now no urinary complaints. He is due for the shingles vaccine. He will get this from the pharmacy along with the covid booster. O: VS: BP 93/60 (Site: Left Upper Arm, Position: Sitting, Cuff Size: Large Adult)   Pulse 69   Temp 98.1 °F (36.7 °C) (Temporal)   Resp 18   Ht 5' 3\" (1.6 m)   Wt 160 lb (72.6 kg)   SpO2 97%   BMI 28.34 kg/m²    General: NAD, appropriate affect and grooming   CV:  RRR, no gallops, rubs, or murmurs   Resp: CTAB   Abd:  Soft, nontender   Ext:  No edema    Impression/Plan:   Essential hypertension, well controlled; continue current medications; refills sent to pharmacy  Asthma, well controlled; continue medications  UTI resolved after completing abx; will repeat PSA; if >7, will refer to urology  HM-patient agrees to get vaccines at pharmacy. F/U 3 months  Attending Physician Statement  I have discussed the case, including pertinent history and exam findings with the resident. I agree with the documented assessment and plan.

## 2022-09-09 RX ORDER — LISINOPRIL 40 MG/1
40 TABLET ORAL DAILY
Qty: 30 TABLET | Refills: 0 | Status: SHIPPED
Start: 2022-09-09 | End: 2022-10-18

## 2022-09-15 DIAGNOSIS — I10 RESISTANT HYPERTENSION: ICD-10-CM

## 2022-09-15 DIAGNOSIS — J45.40 MODERATE PERSISTENT ASTHMA WITHOUT COMPLICATION: ICD-10-CM

## 2022-09-15 RX ORDER — CARVEDILOL 25 MG/1
TABLET ORAL
Qty: 180 TABLET | Refills: 1 | OUTPATIENT
Start: 2022-09-15

## 2022-09-15 NOTE — TELEPHONE ENCOUNTER
Last Appointment:  8/24/2022  Future Appointments   Date Time Provider Oma Vizcaino   11/30/2022  1:40 PM MD Kely Paredes [facility]

## 2022-09-16 RX ORDER — ALBUTEROL SULFATE 90 UG/1
AEROSOL, METERED RESPIRATORY (INHALATION)
Qty: 18 G | Refills: 2 | Status: SHIPPED | OUTPATIENT
Start: 2022-09-16

## 2022-10-01 DIAGNOSIS — I10 RESISTANT HYPERTENSION: ICD-10-CM

## 2022-10-03 RX ORDER — HYDROCHLOROTHIAZIDE 25 MG/1
25 TABLET ORAL DAILY
Qty: 30 TABLET | Refills: 0 | Status: SHIPPED
Start: 2022-10-03 | End: 2022-11-02

## 2022-10-03 RX ORDER — AMLODIPINE BESYLATE 10 MG/1
10 TABLET ORAL DAILY
Qty: 30 TABLET | Refills: 0 | Status: SHIPPED
Start: 2022-10-03 | End: 2022-11-02

## 2022-10-03 NOTE — TELEPHONE ENCOUNTER
Last Appointment:  8/24/2022  Future Appointments   Date Time Provider Oma Vizcaino   11/30/2022  1:40 PM MD Koffi Parra [facility]

## 2022-10-16 DIAGNOSIS — I10 RESISTANT HYPERTENSION: ICD-10-CM

## 2022-10-17 ENCOUNTER — OFFICE VISIT (OUTPATIENT)
Dept: FAMILY MEDICINE CLINIC | Age: 62
End: 2022-10-17
Payer: MEDICARE

## 2022-10-17 VITALS
HEART RATE: 72 BPM | TEMPERATURE: 98.8 F | WEIGHT: 186.56 LBS | BODY MASS INDEX: 33.05 KG/M2 | SYSTOLIC BLOOD PRESSURE: 134 MMHG | OXYGEN SATURATION: 96 % | DIASTOLIC BLOOD PRESSURE: 86 MMHG

## 2022-10-17 DIAGNOSIS — J45.40 MODERATE PERSISTENT ASTHMA WITHOUT COMPLICATION: ICD-10-CM

## 2022-10-17 DIAGNOSIS — N47.1 PHIMOSIS: Primary | ICD-10-CM

## 2022-10-17 DIAGNOSIS — Z23 NEED FOR SHINGLES VACCINE: ICD-10-CM

## 2022-10-17 DIAGNOSIS — Z00.00 HEALTHCARE MAINTENANCE: ICD-10-CM

## 2022-10-17 PROCEDURE — 99213 OFFICE O/P EST LOW 20 MIN: CPT

## 2022-10-17 PROCEDURE — G8482 FLU IMMUNIZE ORDER/ADMIN: HCPCS

## 2022-10-17 PROCEDURE — G8427 DOCREV CUR MEDS BY ELIG CLIN: HCPCS

## 2022-10-17 PROCEDURE — G8417 CALC BMI ABV UP PARAM F/U: HCPCS

## 2022-10-17 PROCEDURE — 3017F COLORECTAL CA SCREEN DOC REV: CPT

## 2022-10-17 PROCEDURE — 1036F TOBACCO NON-USER: CPT

## 2022-10-17 SDOH — ECONOMIC STABILITY: FOOD INSECURITY: WITHIN THE PAST 12 MONTHS, YOU WORRIED THAT YOUR FOOD WOULD RUN OUT BEFORE YOU GOT MONEY TO BUY MORE.: NEVER TRUE

## 2022-10-17 SDOH — ECONOMIC STABILITY: FOOD INSECURITY: WITHIN THE PAST 12 MONTHS, THE FOOD YOU BOUGHT JUST DIDN'T LAST AND YOU DIDN'T HAVE MONEY TO GET MORE.: NEVER TRUE

## 2022-10-17 ASSESSMENT — PATIENT HEALTH QUESTIONNAIRE - PHQ9
SUM OF ALL RESPONSES TO PHQ QUESTIONS 1-9: 0
1. LITTLE INTEREST OR PLEASURE IN DOING THINGS: 0
SUM OF ALL RESPONSES TO PHQ9 QUESTIONS 1 & 2: 0
SUM OF ALL RESPONSES TO PHQ QUESTIONS 1-9: 0
2. FEELING DOWN, DEPRESSED OR HOPELESS: 0
SUM OF ALL RESPONSES TO PHQ QUESTIONS 1-9: 0
SUM OF ALL RESPONSES TO PHQ QUESTIONS 1-9: 0

## 2022-10-17 ASSESSMENT — SOCIAL DETERMINANTS OF HEALTH (SDOH): HOW HARD IS IT FOR YOU TO PAY FOR THE VERY BASICS LIKE FOOD, HOUSING, MEDICAL CARE, AND HEATING?: NOT HARD AT ALL

## 2022-10-17 NOTE — PROGRESS NOTES
[id number]. [address] [facility] RESIDENCY PROGRAM  DATE OF VISIT : 10/24/2022    Patient : Yessenia Villegas   Age : 65 y.o.  : 1960   MRN : [id number]   ________________________________________________________________    Chief Complaint:   Chief Complaint   Patient presents with    Other     Not feeling well       HPI:   History obtained from the patient. Yessenia Villegas is a 65 y.o. male here for Follow up on chronic conditions. Hypertension. Today his blood pressure is stable at 130s/85 and he says that he checks blood pressure daily which tends to be in the range of 120//80. Blood pressure is currently controlled on lisinopril, amlodipine, HydroDIURIL, allopurinol. Currently denies any headaches, chest pain, palpitation    Asthma controlled on flovent and albuterol   Controlled  Does not wake up in the middle of night with SOB   No coughing, wheezes      Today he is asking for a referral to Urology for a circumcision. States that he has been told in the past he has phimosis but didn't want anything done. He now would like to consider getting a circumcision. He is able to retract foreskin and does not present any acute problems for now. Denies difficulty voiding, changes in skin coloration, pain , abnormal discharge. I reviewed the patient's past medications, allergies, past medical history, past surgical history, family history and social history during this visit      ROS:  Pertinent positives and negatives are stated within HPI    Physical Exam:    Vitals: /86   Pulse 72   Temp 98.8 °F (37.1 °C) (Temporal)   Wt 186 lb 9 oz (84.6 kg)   SpO2 96%   BMI 33.05 kg/m²     Physical Exam  Constitutional:       General: She is not in acute distress. Appearance: Normal appearance. She is not ill-appearing. Cardiovascular:      Rate and Rhythm: Normal rate and regular rhythm. Heart sounds: Normal heart sounds. No murmur heard.   Pulmonary: Effort: Pulmonary effort is normal.      Breath sounds: Normal breath sounds. No wheezing, rhonchi or rales. :      Retractable foreskin w/o phimosis. No color changes. No tender to palpation or wounds/lesions observed. Musculoskeletal:         General: No swelling. Right lower leg: No edema. Left lower leg: No edema. Neurological:      General: No focal deficit present. Mental Status: She is alert and oriented to person, place, and time. Mental status is at baseline. Sensory: No sensory deficit. Motor: No weakness. Assessment & Plan:  1. Moderate persistent asthma without complication  Stable. Continue present management. - fluticasone propionate (FLOVENT DISKUS) 100 MCG/BLIST AEPB inhaler; INHALE 1 PUFF INTO THE LUNGS EVERY DAY  Dispense: 60 each; Refill: 3    2. Phimosis  Will send referral to Urology for further evaluation.  - DEQUAN - Bernard Richard MD, Urology, [place]    3. Need for shingles vaccine  - VARICELLA ZOSTER ANTIBODY, IGG; Future  - VARICELLA ZOSTER ANTIBODY, IGG    4. Healthcare maintenance  - VARICELLA ZOSTER ANTIBODY, IGG; Future  - VARICELLA ZOSTER ANTIBODY, IGG    5. HTN  Stable. Continue present management. Educational materials printed for patient's review and were included in patient instructions on his/her After Visit Summary and given to patient at the end of visit. Counseled regarding above diagnosis, including possible risks and complications,  especially if left uncontrolled. Counseled regarding the possible side effects, risks, benefits and alternatives to treatment. Call or go to ED immediately if symptoms worsen or persist. Indications and proper use of medication(s) reviewed. Potential side-effects and risks of medication(s) also explained. Reviewed age and gender appropriate health screening exams and vaccinations.   Advised patient regarding importance of keeping up with recommended health maintenance and to schedule as soon as possible if overdue, as this is important in assessing for undiagnosed pathology, especially cancer, as well as protecting against potentially harmful/life threatening disease. Patient and/or guardian verbalizes understanding and agrees with above counseling, assessment and plan. All questions answered. Return to Office: Return in about 3 months (around 1/17/2023) for bp .     Shira Friday, MD   This case was discussed with Dr. Destiney Lance

## 2022-10-17 NOTE — PROGRESS NOTES
S: 65 y.o. male here for HTN. No cv sxs. Asthma. Flovent and albuterol. ? Phimosis. Wants circumcision. O: VS: /86   Pulse 72   Temp 98.8 °F (37.1 °C) (Temporal)   Wt 186 lb 9 oz (84.6 kg)   SpO2 96%   BMI 33.05 kg/m²    General: NAD, alert and interacting appropriately. CV:  RRR, no gallops, rubs, or murmurs    Resp: CTAB   : retractable foreskin w/o phimosis. No color changes. No ttp. Impression: HTN. Asthma. ? phimosis  Plan:   CPM HTN  CPM asthma  Flu shot  Urology referral  Varicella titer      Attending Physician Statement  I have discussed the case, including pertinent history and exam findings with the resident. I agree with the documented assessment and plan.

## 2022-10-17 NOTE — TELEPHONE ENCOUNTER
Last Appointment:  8/24/2022  Future Appointments   Date Time Provider Oma Vizcaino   1/17/2023  1:20 PM MD Ciarra Barros [facility]

## 2022-10-18 LAB — VARICELLA-ZOSTER VIRUS AB, IGG: NORMAL

## 2022-10-18 RX ORDER — LISINOPRIL 40 MG/1
40 TABLET ORAL DAILY
Qty: 30 TABLET | Refills: 0 | Status: SHIPPED | OUTPATIENT
Start: 2022-10-18 | End: 2023-01-16

## 2022-10-24 PROBLEM — Z23 NEED FOR SHINGLES VACCINE: Status: ACTIVE | Noted: 2022-10-24

## 2022-10-24 PROBLEM — N47.1 PHIMOSIS: Status: ACTIVE | Noted: 2022-10-24

## 2022-11-02 DIAGNOSIS — I10 RESISTANT HYPERTENSION: ICD-10-CM

## 2022-11-02 RX ORDER — AMLODIPINE BESYLATE 10 MG/1
10 TABLET ORAL DAILY
Qty: 30 TABLET | Refills: 0 | Status: SHIPPED | OUTPATIENT
Start: 2022-11-02

## 2022-11-02 RX ORDER — HYDROCHLOROTHIAZIDE 25 MG/1
25 TABLET ORAL DAILY
Qty: 30 TABLET | Refills: 0 | Status: SHIPPED | OUTPATIENT
Start: 2022-11-02

## 2022-11-02 NOTE — TELEPHONE ENCOUNTER
Last Appointment:  10/17/2022  Future Appointments   Date Time Provider Oma Vizcaino   1/17/2023  1:20 PM MD Paxton Canchola [facility]

## 2022-11-16 DIAGNOSIS — I10 RESISTANT HYPERTENSION: ICD-10-CM

## 2022-11-16 NOTE — TELEPHONE ENCOUNTER
Last Appointment:  10/17/2022  Future Appointments   Date Time Provider Oma Vizcaino   1/17/2023  1:20 PM Alix Cabrera MD Tera Males [facility]

## 2022-11-22 RX ORDER — LISINOPRIL 40 MG/1
40 TABLET ORAL DAILY
Qty: 90 TABLET | Refills: 1 | Status: SHIPPED
Start: 2022-11-22 | End: 2023-01-04 | Stop reason: SDUPTHER

## 2022-12-08 DIAGNOSIS — I10 RESISTANT HYPERTENSION: ICD-10-CM

## 2022-12-08 NOTE — TELEPHONE ENCOUNTER
Last Appointment:  10/17/2022  Future Appointments   Date Time Provider Oma Vizcaino   1/17/2023  1:20 PM MD Navarro Bennett [facility]

## 2022-12-10 RX ORDER — HYDROCHLOROTHIAZIDE 25 MG/1
25 TABLET ORAL DAILY
Qty: 30 TABLET | Refills: 0 | Status: SHIPPED | OUTPATIENT
Start: 2022-12-10

## 2022-12-10 RX ORDER — AMLODIPINE BESYLATE 10 MG/1
10 TABLET ORAL DAILY
Qty: 30 TABLET | Refills: 0 | Status: SHIPPED | OUTPATIENT
Start: 2022-12-10

## 2023-01-02 DIAGNOSIS — J45.40 MODERATE PERSISTENT ASTHMA WITHOUT COMPLICATION: ICD-10-CM

## 2023-01-02 DIAGNOSIS — I10 RESISTANT HYPERTENSION: ICD-10-CM

## 2023-01-03 NOTE — TELEPHONE ENCOUNTER
Last Appointment:  10/17/2022  Future Appointments  1/17/2023  1:20 PM    MD Zac Coy [facility]        [facility]

## 2023-01-03 NOTE — TELEPHONE ENCOUNTER
Last Appointment:  12/8/2021  Future Appointments  1/17/2023  1:20 PM    MD Kely Paredes [facility]        [facility]

## 2023-01-04 DIAGNOSIS — J45.40 MODERATE PERSISTENT ASTHMA WITHOUT COMPLICATION: ICD-10-CM

## 2023-01-04 DIAGNOSIS — I10 RESISTANT HYPERTENSION: ICD-10-CM

## 2023-01-04 RX ORDER — CARVEDILOL 25 MG/1
TABLET ORAL
Qty: 180 TABLET | Refills: 1 | Status: SHIPPED | OUTPATIENT
Start: 2023-01-04

## 2023-01-04 RX ORDER — HYDROCHLOROTHIAZIDE 25 MG/1
25 TABLET ORAL DAILY
Qty: 30 TABLET | Refills: 0 | OUTPATIENT
Start: 2023-01-04

## 2023-01-04 RX ORDER — HYDRALAZINE HYDROCHLORIDE 25 MG/1
TABLET, FILM COATED ORAL
Qty: 270 TABLET | Refills: 1 | Status: SHIPPED | OUTPATIENT
Start: 2023-01-04

## 2023-01-04 RX ORDER — HYDROCHLOROTHIAZIDE 25 MG/1
25 TABLET ORAL DAILY
Qty: 30 TABLET | Refills: 0 | Status: SHIPPED | OUTPATIENT
Start: 2023-01-04

## 2023-01-04 RX ORDER — HYDRALAZINE HYDROCHLORIDE 25 MG/1
TABLET, FILM COATED ORAL
Qty: 270 TABLET | Refills: 1 | OUTPATIENT
Start: 2023-01-04

## 2023-01-04 RX ORDER — ALBUTEROL SULFATE 2.5 MG/3ML
SOLUTION RESPIRATORY (INHALATION)
OUTPATIENT
Start: 2023-01-04

## 2023-01-04 RX ORDER — ALBUTEROL SULFATE 90 UG/1
AEROSOL, METERED RESPIRATORY (INHALATION)
Qty: 18 G | Refills: 2 | Status: CANCELLED | OUTPATIENT
Start: 2023-01-04

## 2023-01-04 RX ORDER — DOXAZOSIN MESYLATE 4 MG/1
TABLET ORAL
Qty: 90 TABLET | Refills: 1 | Status: SHIPPED | OUTPATIENT
Start: 2023-01-04

## 2023-01-04 RX ORDER — AMLODIPINE BESYLATE 10 MG/1
10 TABLET ORAL DAILY
Qty: 30 TABLET | Refills: 0 | Status: SHIPPED | OUTPATIENT
Start: 2023-01-04

## 2023-01-04 RX ORDER — AMLODIPINE BESYLATE 10 MG/1
10 TABLET ORAL DAILY
Qty: 30 TABLET | Refills: 0 | OUTPATIENT
Start: 2023-01-04

## 2023-01-04 RX ORDER — ALBUTEROL SULFATE 2.5 MG/3ML
2.5 SOLUTION RESPIRATORY (INHALATION) EVERY 4 HOURS PRN
Qty: 3 ML | Refills: 1 | Status: SHIPPED | OUTPATIENT
Start: 2023-01-04

## 2023-01-04 RX ORDER — LISINOPRIL 40 MG/1
40 TABLET ORAL DAILY
Qty: 90 TABLET | Refills: 1 | Status: SHIPPED | OUTPATIENT
Start: 2023-01-04 | End: 2023-07-03

## 2023-01-30 ENCOUNTER — OFFICE VISIT (OUTPATIENT)
Dept: FAMILY MEDICINE CLINIC | Age: 63
End: 2023-01-30
Payer: MEDICARE

## 2023-01-30 VITALS
SYSTOLIC BLOOD PRESSURE: 135 MMHG | DIASTOLIC BLOOD PRESSURE: 88 MMHG | HEART RATE: 70 BPM | BODY MASS INDEX: 35.26 KG/M2 | TEMPERATURE: 98.2 F | HEIGHT: 63 IN | OXYGEN SATURATION: 96 % | RESPIRATION RATE: 18 BRPM | WEIGHT: 199 LBS

## 2023-01-30 DIAGNOSIS — E78.5 HYPERLIPIDEMIA, UNSPECIFIED HYPERLIPIDEMIA TYPE: ICD-10-CM

## 2023-01-30 DIAGNOSIS — I10 ESSENTIAL HYPERTENSION: ICD-10-CM

## 2023-01-30 DIAGNOSIS — J45.40 MODERATE PERSISTENT ASTHMA WITHOUT COMPLICATION: ICD-10-CM

## 2023-01-30 DIAGNOSIS — E11.9 TYPE 2 DIABETES MELLITUS WITHOUT COMPLICATION, WITHOUT LONG-TERM CURRENT USE OF INSULIN (HCC): ICD-10-CM

## 2023-01-30 DIAGNOSIS — Z13.1 DIABETES MELLITUS SCREENING: Primary | ICD-10-CM

## 2023-01-30 DIAGNOSIS — G47.33 OBSTRUCTIVE SLEEP APNEA SYNDROME: ICD-10-CM

## 2023-01-30 DIAGNOSIS — N47.1 PHIMOSIS: ICD-10-CM

## 2023-01-30 LAB — HBA1C MFR BLD: 6.9 %

## 2023-01-30 PROCEDURE — 99213 OFFICE O/P EST LOW 20 MIN: CPT

## 2023-01-30 PROCEDURE — 3017F COLORECTAL CA SCREEN DOC REV: CPT

## 2023-01-30 PROCEDURE — G8417 CALC BMI ABV UP PARAM F/U: HCPCS

## 2023-01-30 PROCEDURE — 3078F DIAST BP <80 MM HG: CPT

## 2023-01-30 PROCEDURE — 3074F SYST BP LT 130 MM HG: CPT

## 2023-01-30 PROCEDURE — G8482 FLU IMMUNIZE ORDER/ADMIN: HCPCS

## 2023-01-30 PROCEDURE — 1036F TOBACCO NON-USER: CPT

## 2023-01-30 PROCEDURE — 83036 HEMOGLOBIN GLYCOSYLATED A1C: CPT

## 2023-01-30 PROCEDURE — G8428 CUR MEDS NOT DOCUMENT: HCPCS

## 2023-01-30 PROCEDURE — 2022F DILAT RTA XM EVC RTNOPTHY: CPT

## 2023-01-30 PROCEDURE — 99212 OFFICE O/P EST SF 10 MIN: CPT

## 2023-01-30 PROCEDURE — 3044F HG A1C LEVEL LT 7.0%: CPT

## 2023-01-30 RX ORDER — ATORVASTATIN CALCIUM 10 MG/1
10 TABLET, FILM COATED ORAL DAILY
Qty: 30 TABLET | Refills: 3 | Status: SHIPPED | OUTPATIENT
Start: 2023-01-30

## 2023-01-30 RX ORDER — ALBUTEROL SULFATE 90 UG/1
2 AEROSOL, METERED RESPIRATORY (INHALATION) EVERY 6 HOURS PRN
Qty: 18 G | Refills: 3 | Status: SHIPPED | OUTPATIENT
Start: 2023-01-30

## 2023-01-30 NOTE — PROGRESS NOTES
S: 65 y.o. 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 On cardura; follows with urology    O: VS: /88 (Site: Right Upper Arm, Position: Sitting, Cuff Size: Medium Adult)   Pulse 70   Temp 98.2 °F (36.8 °C) (Temporal)   Resp 18   Ht 5' 3\" (1.6 m)   Wt 199 lb (90.3 kg)   SpO2 96%   BMI 35.25 kg/m²   AAO/NAD, appropriate affect for mood  CV:  RRR, no murmur  Resp: CTAB  Abdomen: SNTND  Ext: No edema    Assessment/Plan:   1) HTN - stable, cpm; try to combine medications  2) HLD - start lipitor 10mg  3) Asthma - change nebs to inhalers  4) newly diagnosed DMII - start diabetic ed; start metformin 500mg daily for now  5) GIOVANNI - stable, cpm  6) BPH / phymosis - continue to f/u with urology  7) HM / labs as ordered  RTO: 3 months      Attending Physician Statement  I have discussed the case, including pertinent history and exam findings with the resident. I agree with the documented assessment and plan.       Electronically signed by Mack Dubin, MD on 1/31/2023 at 5:14 PM

## 2023-01-30 NOTE — PROGRESS NOTES
[address] [facility] RESIDENCY PROGRAM  DATE OF VISIT : 2023    Patient : Jenise Vidales   Age : 65 y.o.  : 1960   MRN : [id number]   ________________________________________________________________    Chief Complaint:   Chief Complaint   Patient presents with    3 Month Follow-Up       HPI:   History obtained from the patient. Jenise Vidales is a 65 y.o. male here for     Hypertension. Today his blood pressure is stable at 130s/85 . He checks blood pressure daily which tends to be in the range of 120//80. Blood pressure is currently controlled on norvasc 10mg, coreg 25mg, cardura 4mg; lisinpril 40mg, hydralazine 25mg hctz 25mg;  Currently denies any headaches, chest pain, palpitation       Diabetes   A1c 6.9% today. Has not been eating healthy foods and has avoided exercising more than usual lately given his recent surgery and problems. Not on medications, never tried insulin. Agreeable to start medication. Asthma   controlled on flovent and albuterol   Controlled  Does not wake up in the middle of night with SOB   No coughing, wheezes     KAUSHIK   Cpap nightly, Not snoring, waking up in the middle of the night or feeling tired. Stable    Urology - Acquired buried penis. Follows with Urology Dr. Doni Kelsey at [place]. Seen on 2023. Post urethral meatotomy, urethral dilation and scrotoplasty on 23. Also excision of inflamed tissue,  Stable. Well healing and no concern at the time    Hyperlipidemia   The 10-year ASCVD risk score (Hiren DK, et al., 2019) is: 12%    Values used to calculate the score:      Age: 65 years      Sex: Male      Is Non- : No      Diabetic: No      Tobacco smoker: No      Systolic Blood Pressure: 229 mmHg      Is BP treated: Yes      HDL Cholesterol: 34 mg/dL      Total Cholesterol: 148 mg/dL  Not on a statin currently. Agreeable to try medication.      I reviewed the patient's past medications, allergies, past medical history, past surgical history, family history and social history during this visit      ROS:  Pertinent positives and negatives are stated within HPI    Physical Exam:    Vitals: /88 (Site: Right Upper Arm, Position: Sitting, Cuff Size: Medium Adult)   Pulse 70   Temp 98.2 °F (36.8 °C) (Temporal)   Resp 18   Ht 5' 3\" (1.6 m)   Wt 199 lb (90.3 kg)   SpO2 96%   BMI 35.25 kg/m²     Physical Exam  Constitutional:       General: She is not in acute distress. Appearance: Normal appearance. She is not ill-appearing. Cardiovascular:      Rate and Rhythm: Normal rate and regular rhythm. Heart sounds: Normal heart sounds. No murmur heard. Pulmonary:      Effort: Pulmonary effort is normal.      Breath sounds: Normal breath sounds. No wheezing, rhonchi or rales. Abdominal:      General: Bowel sounds are normal.      Palpations: Abdomen is soft. There is no mass. Tenderness: There is no abdominal tenderness. Musculoskeletal:         General: No swelling. Right lower leg: No edema. Left lower leg: No edema. Neurological:      General: No focal deficit present. Mental Status: She is alert and oriented to person, place, and time. Mental status is at baseline. Sensory: No sensory deficit. Motor: No weakness. Assessment & Plan:  1. Diabetes mellitus   Will start metformin 500 mgs and instructed him to increase to 1000mg daily if tolerated. Newly diagnosed. Exercise and diet counseling provided this visit. Refer to below. Repeat A1c in 3 months.  - POCT glycosylated hemoglobin (Hb A1C)  - LIPID PANEL; Future  - [facility] - Diabetes Education[facility]    2.  Essential hypertension  BP well controlled in office today  Continue current medications  Advised to limit salt in diet  Patient advised to lose weight to help reducing BP  Patient advised to drink alcohol with moderation  Patient counseled to stop smoking and using tobacco      3. Moderate persistent asthma without complication  Stable. Continue current management,    4. Phimosis  Stable. Continue management,with Urology     5. Obstructive sleep apnea syndrome  Stable. Continue current management,    6. Hyperlipidemia, unspecified hyperlipidemia type  Will start Statin Lipitor 10mgs today and obtain lipid panel. Exercise and diet counseling provided this visit. Educational materials printed for patient's review and were included in patient instructions on his/her After Visit Summary and given to patient at the end of visit. Counseled regarding above diagnosis, including possible risks and complications,  especially if left uncontrolled. Counseled regarding the possible side effects, risks, benefits and alternatives to treatment. Call or go to ED immediately if symptoms worsen or persist. Indications and proper use of medication(s) reviewed. Potential side-effects and risks of medication(s) also explained. Reviewed age and gender appropriate health screening exams and vaccinations. Advised patient regarding importance of keeping up with recommended health maintenance and to schedule as soon as possible if overdue, as this is important in assessing for undiagnosed pathology, especially cancer, as well as protecting against potentially harmful/life threatening disease. Patient and/or guardian verbalizes understanding and agrees with above counseling, assessment and plan. All questions answered. Return to Office: Return in about 3 months (around 4/30/2023).     Luis Miguel Fraire MD   This case was discussed with Dr. Desmond Watts

## 2023-01-31 ENCOUNTER — NURSE ONLY (OUTPATIENT)
Dept: FAMILY MEDICINE CLINIC | Age: 63
End: 2023-01-31
Payer: MEDICARE

## 2023-01-31 DIAGNOSIS — Z13.1 DIABETES MELLITUS SCREENING: ICD-10-CM

## 2023-01-31 DIAGNOSIS — Z13.220 LIPID SCREENING: Primary | ICD-10-CM

## 2023-01-31 PROBLEM — E11.9 TYPE 2 DIABETES MELLITUS WITHOUT COMPLICATION, WITHOUT LONG-TERM CURRENT USE OF INSULIN (HCC): Status: ACTIVE | Noted: 2023-01-31

## 2023-01-31 PROBLEM — E78.5 HYPERLIPIDEMIA: Status: ACTIVE | Noted: 2023-01-31

## 2023-01-31 LAB
CHOLESTEROL, TOTAL: 184 MG/DL (ref 0–199)
HDLC SERPL-MCNC: 42 MG/DL
LDL CHOLESTEROL CALCULATED: 83 MG/DL (ref 0–99)
TRIGL SERPL-MCNC: 295 MG/DL (ref 0–149)
VLDLC SERPL CALC-MCNC: 59 MG/DL

## 2023-01-31 PROCEDURE — 36415 COLL VENOUS BLD VENIPUNCTURE: CPT

## 2023-01-31 RX ORDER — ALBUTEROL SULFATE 90 MCG
2 HFA AEROSOL WITH ADAPTER (GRAM) INHALATION EVERY 6 HOURS PRN
Qty: 18 G | Refills: 3
Start: 2023-01-31

## 2023-02-14 ENCOUNTER — HOSPITAL ENCOUNTER (OUTPATIENT)
Dept: DIABETES SERVICES | Age: 63
Setting detail: THERAPIES SERIES
Discharge: HOME | End: 2023-02-14
Payer: MEDICAID

## 2023-02-14 PROCEDURE — G0108 DIAB MANAGE TRN  PER INDIV: HCPCS

## 2023-02-14 SDOH — ECONOMIC STABILITY: FOOD INSECURITY: ADDITIONAL INFORMATION: NO

## 2023-02-14 ASSESSMENT — PROBLEM AREAS IN DIABETES QUESTIONNAIRE (PAID)
FEELING THAT DIABETES IS TAKING UP TOO MUCH OF YOUR MENTAL AND PHYSICAL ENERGY EVERY DAY: 0
COPING WITH COMPLICATIONS OF DIABETES: 0
FEELING DEPRESSED WHEN YOU THINK ABOUT LIVING WITH DIABETES: 0
FEELING SCARED WHEN YOU THINK ABOUT LIVING WITH DIABETES: 0
WORRYING ABOUT THE FUTURE AND THE POSSIBILITY OF SERIOUS COMPLICATIONS: 0
PAID-5 TOTAL SCORE: 0

## 2023-02-14 NOTE — PROGRESS NOTES
Diabetes Self-Management Education Record    Participant Name: Dinesh Yung  Referring Provider: Jewel Murphy MD  Assessment/Evaluation Ratings:  1=Needs Instruction   4=Demonstrates Understanding/Competency  2=Needs Review   NC=Not Covered    3=Comprehends Key Points  N/A=Not Applicable  Topics/Learning Objectives Pre-session Assess Date:  Instructor initials/date  2/14/23 KMS Instr. Date    Instructor initials/date  2/14/23 KMS Follow-up Post- session Eval Comments   Diabetes disease process & Treatment process:   -Define type of diabetes in simple terms.  - Describe the ABCs of  diabetes management  -Identify own type of diabetes  -Identify lifestyle changes/treatment options  -other:  1 [x] All     []  []  []  []  []  3 2/14/23 KMS  Type 2 DM newly diagnosed last week  A1C 6.9%   Developing strategies for Healthy coping/psychosocial issues:    -Describe feelings about living with diabetes  -Identify coping strategies and sources of stress  -Identify support needed & support network available  -Complete PAID-5 Diabetes questionnaire 1 [x] All     []  []  []    []  3 Date: 2/14/23 KMS  PAID-5 Score: 0  Confidence Score: 10           Prevention, detection & treatment of Chronic complications:    -Identify the prevention, detection and treatment for complications including immunizations, preventive eye, foot, dental and renal exams as indicated per the participant's duration of diabetes and health status.  -Define the natural course of diabetes and the relationship of blood glucose levels to long term complications of diabetes.   1 [x] All     []            []  3    Prevention, detection & treatment of acute complications:    -State the causes,signs & symptoms of hyper & hypoglycemia, and prevention & treatment strategies.   -Describe sick day guidelines  DKA /indications for ketone testing &  when to call physician  1 [x] All     []      []    3              -Identify severe weather/situation crisis  & diabetes supplies management 1 []      Using medications safely:   -State effects of diabetes medicines on blood glucose levels;  -List diabetes medication taken, action & side effects 1 [x] All     []  []  3 2/14/23 KMS  Metformin 500 mg daily with breakfast    Insulin/Injectables/glucagon  -Name appropriate injection sites; proper storage; supplies needed;  1   []   N/A    Demonstrate proper technique  []      Monitoring blood glucose, interpreting and using results:   -Identify the purpose of testing   -Identify recommended & personal blood glucose targets & HgbA1C target levels  -State the Importance of logging blood glucose levels for pattern recognition;   -State benefits of reading/using pt generated health data  -Verbalize safe lancet disposal 1 [x] All     []  []    []  []  []  3 2/14/23 KMS  Not ordered, but reviewed targets with patient and encouraged to discuss further with doctor as he is interested in owning a glucose meter to be able to spot check his glucose levels. -Demonstrate proper testing technique  []      Incorporating physical activity into lifestyle:   -State effect of exercise on blood glucose levels;   -State benefits of regular exercise;   -Define safety considerations/food choices if needed.  -Describe contraindications/maintenance of activity. 1 [x] All     []  []    []  []  4 2/14/23 KMS  Not physically active d/t recent surgery and off-season from [address] job, but patient plans to start walking at least 10 minutes per day.     Incorporating nutritional management into lifestyle:   -Describe effect of type, amount & timing of food on blood glucose  -Describe methods for preparing and planning   healthy meals  -Correctly read food labels for nutritional values  -Name 3 foods high in Carbohydrate  -Plan a sample 4 carbohydrate-controlled meal using Diabetes Plate Method  -Verbalized ability to measure and count carbohydrate gram servings using food labels and carbohydrate food list.    -Plan a carbohydrate-controlled meal based on individual needs/preferences from a Registered Dietitian.   1 [x] All       []    []    []  []      []        []  3 2/14/23 KMS  Patient drinks up to 10 cups of juice per day, but is willing to eliminate from diet. Patient is able to correctly identify carbohydrate foods, use the plate method for portion control, and read nutrition labels to determine carbohydrate servings. Patient will aim to eat 3 meals daily (spaced out every 4.5 to 5 hours). Patient will choose 3 to 4 carb choices per meal and 1 carb choice per bedtime snack. Developing strategies for problem solving to promote health/change behavior. -Identify 7 self-care behaviors; Personal health risk factors; Benefits, challenges & strategies for behavioral change and set an individualized goal selection. 1   [x]  4 2/14/23 KMS  [x]Nutrition: Eliminate juice from diet/use the plate method. []Monitoring  []Exercise  []Medication  []Other     Identified Barriers to learning/adherence to self management plan:    Language  []  other    Instruction Method:  Lecture/Discussion and Handouts    Supporting Education Materials/Equipment Provided: Self-management manual and Nutritional Packet   []Irish materials       [] services     []Other:      Encounter Type Date Attended Start Time End Time Comments No Show Dates   Assessment          Session 1         Session 2        1:1 DSMES  2/14/23 KMS 1300 1400      In person Follow-up         Gestational Diabetes         DSMES #3        Meter Instrx        Insulin Instrx           Additional Comments: [x] Pt seen individually d/t language barrier.  used via iPad for entire session. Patient's son-in-law attended with him and was very supportive.          Date:   Follow-up goal attainment based on patients initial DSMES goal    Dr Notified by [] EMR []Fax        []Post class Hgb A1C  []Medication compliance []Plate method/meal plan compliance   []Able to state the number of Carbohydrate servings eaten at B,L,D   []Testing blood glucose as prescribed by PCP   []Exercise Routine   []Other:   []Other:     []Patient lost to follow-up  Dr Notified by []EMR []Fax

## 2023-02-14 NOTE — LETTER
[facility])- Diabetes Education Department Diabetes Education Letter    2023       Re:     Jaswinder Cuello         :  1960  Dear Dr. Rashad Sharp: Thank you for referring your patient, Jaswinder Cuello, to [facility]) diabetes education. Celestino Palencia has completed their personalized comprehensive education plan. The education plan included the following topics:          Diabetes disease process & treatment   Healthy Eating  Being Active  Taking Medication  Monitoring Glucose  Acute and Chronic Complications  Lifestyle and Healthy coping  Diabetes Distress and Support       In addition, the PAID -5 (Problem Areas in Diabetes) Survey was completed. Score: 0    Celestino Palencia selected behavioral goal is:    [x]Healthy Eating: Eliminate juice from diet/use the plate method for portion control. []Monitoring  []Problem Solving  []Being Active  []Taking Medication  []Other: We will send a follow-up letter in 3 months to notify you of their progress. Thank you for referring this patient to our program.  Please do not hesitate to call if you have any questions at [phone] [facility] or [facility]) or [phone] ([address] [place] [address]).         Sincerely,    Jenny Garcia RN, BSN, [address] [facility] Diabetes Education Department  American Diabetes Association Recognized DSMES Program

## 2023-03-17 RX ORDER — ALBUTEROL SULFATE 90 UG/1
2 AEROSOL, METERED RESPIRATORY (INHALATION) EVERY 6 HOURS PRN
Qty: 18 G | Refills: 3 | Status: CANCELLED | OUTPATIENT
Start: 2023-03-17

## 2023-03-21 RX ORDER — ALBUTEROL SULFATE 2.5 MG/3ML
2.5 SOLUTION RESPIRATORY (INHALATION) EVERY 4 HOURS PRN
Qty: 3 ML | Refills: 2 | Status: SHIPPED | OUTPATIENT
Start: 2023-03-21

## 2023-03-21 RX ORDER — ALBUTEROL SULFATE 2.5 MG/3ML
2.5 SOLUTION RESPIRATORY (INHALATION) EVERY 4 HOURS PRN
Qty: 3 ML | Refills: 1 | Status: SHIPPED | OUTPATIENT
Start: 2023-03-21

## 2023-03-21 RX ORDER — ALBUTEROL SULFATE 90 MCG
2 HFA AEROSOL WITH ADAPTER (GRAM) INHALATION EVERY 6 HOURS PRN
Qty: 18 G | Refills: 3 | Status: SHIPPED | OUTPATIENT
Start: 2023-03-21

## 2023-07-20 DIAGNOSIS — I10 RESISTANT HYPERTENSION: ICD-10-CM

## 2023-07-20 RX ORDER — CARVEDILOL 25 MG/1
TABLET ORAL
Qty: 180 TABLET | Refills: 1 | Status: SHIPPED | OUTPATIENT
Start: 2023-07-20

## 2023-08-25 DIAGNOSIS — M1A.0710 CHRONIC GOUT OF RIGHT FOOT, UNSPECIFIED CAUSE: ICD-10-CM

## 2023-08-30 RX ORDER — ALLOPURINOL 100 MG/1
TABLET ORAL
Qty: 180 TABLET | Refills: 1 | Status: SHIPPED | OUTPATIENT
Start: 2023-08-30

## 2023-10-20 ENCOUNTER — OFFICE VISIT (OUTPATIENT)
Dept: FAMILY MEDICINE CLINIC | Age: 63
End: 2023-10-20

## 2023-10-20 VITALS
HEART RATE: 72 BPM | HEIGHT: 63 IN | OXYGEN SATURATION: 96 % | WEIGHT: 180 LBS | TEMPERATURE: 98.5 F | DIASTOLIC BLOOD PRESSURE: 80 MMHG | SYSTOLIC BLOOD PRESSURE: 131 MMHG | BODY MASS INDEX: 31.89 KG/M2 | RESPIRATION RATE: 16 BRPM

## 2023-10-20 DIAGNOSIS — Z13.220 LIPID SCREENING: ICD-10-CM

## 2023-10-20 DIAGNOSIS — M17.11 PRIMARY OSTEOARTHRITIS OF RIGHT KNEE: ICD-10-CM

## 2023-10-20 DIAGNOSIS — I10 ESSENTIAL HYPERTENSION: ICD-10-CM

## 2023-10-20 DIAGNOSIS — E11.9 TYPE 2 DIABETES MELLITUS WITHOUT COMPLICATION, WITHOUT LONG-TERM CURRENT USE OF INSULIN (HCC): Primary | ICD-10-CM

## 2023-10-20 LAB
ANION GAP SERPL CALCULATED.3IONS-SCNC: 15 MMOL/L (ref 7–16)
BUN BLDV-MCNC: 21 MG/DL (ref 6–23)
CALCIUM SERPL-MCNC: 9.6 MG/DL (ref 8.6–10.2)
CHLORIDE BLD-SCNC: 99 MMOL/L (ref 98–107)
CHOLESTEROL: 185 MG/DL
CO2: 21 MMOL/L (ref 22–29)
CREAT SERPL-MCNC: 0.9 MG/DL (ref 0.7–1.2)
GFR SERPL CREATININE-BSD FRML MDRD: >60 ML/MIN/1.73M2
GLUCOSE BLD-MCNC: 130 MG/DL (ref 74–99)
HBA1C MFR BLD: 6.3 %
HDLC SERPL-MCNC: 46 MG/DL
LDL CHOLESTEROL: 105 MG/DL
POTASSIUM SERPL-SCNC: 4.4 MMOL/L (ref 3.5–5)
SODIUM BLD-SCNC: 135 MMOL/L (ref 132–146)
TRIGL SERPL-MCNC: 169 MG/DL
VLDLC SERPL CALC-MCNC: 34 MG/DL

## 2023-10-20 PROCEDURE — 36415 COLL VENOUS BLD VENIPUNCTURE: CPT | Performed by: FAMILY MEDICINE

## 2023-10-20 PROCEDURE — 90471 IMMUNIZATION ADMIN: CPT | Performed by: FAMILY MEDICINE

## 2023-10-20 PROCEDURE — 90674 CCIIV4 VAC NO PRSV 0.5 ML IM: CPT | Performed by: FAMILY MEDICINE

## 2023-10-20 RX ORDER — DILTIAZEM HYDROCHLORIDE 60 MG/1
2 TABLET, FILM COATED ORAL 2 TIMES DAILY
Qty: 10.2 G | Refills: 3
Start: 2023-10-20

## 2023-10-20 RX ORDER — ALBUTEROL SULFATE 2.5 MG/3ML
2.5 SOLUTION RESPIRATORY (INHALATION) EVERY 4 HOURS PRN
Qty: 3 ML | Refills: 2 | Status: SHIPPED | OUTPATIENT
Start: 2023-10-20

## 2023-10-20 SDOH — ECONOMIC STABILITY: FOOD INSECURITY: WITHIN THE PAST 12 MONTHS, YOU WORRIED THAT YOUR FOOD WOULD RUN OUT BEFORE YOU GOT MONEY TO BUY MORE.: NEVER TRUE

## 2023-10-20 SDOH — ECONOMIC STABILITY: FOOD INSECURITY: WITHIN THE PAST 12 MONTHS, THE FOOD YOU BOUGHT JUST DIDN'T LAST AND YOU DIDN'T HAVE MONEY TO GET MORE.: NEVER TRUE

## 2023-10-20 SDOH — ECONOMIC STABILITY: INCOME INSECURITY: HOW HARD IS IT FOR YOU TO PAY FOR THE VERY BASICS LIKE FOOD, HOUSING, MEDICAL CARE, AND HEATING?: NOT HARD AT ALL

## 2023-10-20 SDOH — ECONOMIC STABILITY: HOUSING INSECURITY
IN THE LAST 12 MONTHS, WAS THERE A TIME WHEN YOU DID NOT HAVE A STEADY PLACE TO SLEEP OR SLEPT IN A SHELTER (INCLUDING NOW)?: NO

## 2023-10-20 ASSESSMENT — PATIENT HEALTH QUESTIONNAIRE - PHQ9
1. LITTLE INTEREST OR PLEASURE IN DOING THINGS: 0
SUM OF ALL RESPONSES TO PHQ9 QUESTIONS 1 & 2: 0
SUM OF ALL RESPONSES TO PHQ QUESTIONS 1-9: 0
SUM OF ALL RESPONSES TO PHQ QUESTIONS 1-9: 0
2. FEELING DOWN, DEPRESSED OR HOPELESS: 0
SUM OF ALL RESPONSES TO PHQ QUESTIONS 1-9: 0
SUM OF ALL RESPONSES TO PHQ QUESTIONS 1-9: 0

## 2023-10-20 ASSESSMENT — LIFESTYLE VARIABLES: HOW OFTEN DO YOU HAVE A DRINK CONTAINING ALCOHOL: NEVER

## 2023-10-20 NOTE — PROGRESS NOTES
[address]  FAMILY MEDICINE RESIDENCY PROGRAM  DATE OF VISIT : 10/20/2023    Patient : Keila Hannah   Age : 59 y.o.  : 1960   MRN : [id number]   ________________________________________________________________    Chief Complaint:   Chief Complaint   Patient presents with    Check-Up     Knee pain       HPI:   History obtained from the patient. Keila Hannah is a 59 y.o. male here for     Hypertension. Today his blood pressure is stable at 130s/85 . He checks blood pressure daily which tends to be in the range of 120//80. Blood pressure is currently controlled on norvasc 10mg, coreg 25mg, cardura 4mg; lisinpril 40mg, hydralazine 25mg hctz 25mg;  Blood pressure daily is 130/80  Checks BP daily. Currently denies any headaches, chest pain, palpitation       Diabetes   A1c 6.3% today. Has been eating healthy foods and has avoided exercising more than usual   On metformin 500mgs BID. Able to tolerate and A1c decreased to 6.3% today from previous measurement. No polydipsia, polyphagia, polyuria. Asthma   On flovent and albuterol. Uses albuterol 5 times per weeks, one time per day ,   No coughing, wheezing. Agreeable to try different inhaler. Urology - Stable. Well healing and no concern at the time. Post surgery at [facility]. Wants to establish with local urologist.       Hyperlipidemia   The 10-year ASCVD risk score (Hiren INGRAM, et al., 2019) is: 23.3%    Values used to calculate the score:      Age: 59 years      Sex: Male      Is Non- : No      Diabetic: Yes      Tobacco smoker: No      Systolic Blood Pressure: 597 mmHg      Is BP treated: Yes      HDL Cholesterol: 42 mg/dL      Total Cholesterol: 184 mg/dL  Lab Results   Component Value Date    CHOL 184 2023    TRIG 295 (H) 2023    HDL 42 2023    LDLCALC 83 2023    LABVLDL 59 2023   On a statin.   Due for repeat lipid

## 2023-11-10 ENCOUNTER — OFFICE VISIT (OUTPATIENT)
Dept: FAMILY MEDICINE CLINIC | Age: 63
End: 2023-11-10

## 2023-11-10 VITALS
HEART RATE: 69 BPM | DIASTOLIC BLOOD PRESSURE: 89 MMHG | WEIGHT: 181 LBS | TEMPERATURE: 98.6 F | RESPIRATION RATE: 18 BRPM | SYSTOLIC BLOOD PRESSURE: 145 MMHG | OXYGEN SATURATION: 96 % | BODY MASS INDEX: 32.07 KG/M2 | HEIGHT: 63 IN

## 2023-11-10 DIAGNOSIS — M17.11 PRIMARY OSTEOARTHRITIS OF RIGHT KNEE: Primary | ICD-10-CM

## 2023-11-10 RX ORDER — DILTIAZEM HYDROCHLORIDE 60 MG/1
2 TABLET, FILM COATED ORAL 2 TIMES DAILY
Qty: 10.2 G | Refills: 3 | Status: SHIPPED | OUTPATIENT
Start: 2023-11-10

## 2023-11-10 RX ORDER — LIDOCAINE HYDROCHLORIDE 10 MG/ML
5 INJECTION, SOLUTION INFILTRATION; PERINEURAL ONCE
Status: COMPLETED | OUTPATIENT
Start: 2023-11-10 | End: 2023-11-10

## 2023-11-10 RX ORDER — TRIAMCINOLONE ACETONIDE 40 MG/ML
40 INJECTION, SUSPENSION INTRA-ARTICULAR; INTRAMUSCULAR ONCE
Status: COMPLETED | OUTPATIENT
Start: 2023-11-10 | End: 2023-11-10

## 2023-11-10 RX ADMIN — LIDOCAINE HYDROCHLORIDE 5 ML: 10 INJECTION, SOLUTION INFILTRATION; PERINEURAL at 13:24

## 2023-11-10 RX ADMIN — TRIAMCINOLONE ACETONIDE 40 MG: 40 INJECTION, SUSPENSION INTRA-ARTICULAR; INTRAMUSCULAR at 13:23

## 2023-11-10 NOTE — PROGRESS NOTES
[address]  FAMILY MEDICINE RESIDENCY PROGRAM  DATE OF VISIT : 11/10/2023    Patient : Lucien Obrien   Age : 59 y.o.  : 1960   MRN : [id number]   ________________________________________________________________    Chief Complaint:   Chief Complaint   Patient presents with    Knee Pain     Right knee injection        HPI:   History obtained from the patient. Lucien Obrien is a 59 y.o. male in office for discussion of osteoarthritis of the Right knee and for placement of a joint injection. Discussed risk versus benefits with patient of knee joint injections including the risk of infection, bleeding, etc.  Also discussed conservative treatment measures such as physical therapy, OTC pain medications, physical therapy. At this time patient is agreeable to a joint injection. Patient was placed in seated in the examination room. Written consent was obtained. The patient's knee was examined, landmarks were palpated and discovered. The appropriate landmarks, and  soft tissue space was located and marked. A preparation of 4 cc of lidocaine, 1 cc of Kenalog 40 mg was prepared. The procedure site was located and cleaned with an alcohol solution. A timeout was conducted and the patient's name, date of birth and allergies were verified. Using a lateral approach the medication preparation was injected without difficulty. The needle was aspirated and found to be without any signs of hemorrhage or bleeding. The procedure was completed and the patient tolerated the procedure well without any complications.   Any blood loss was immediately controlled, bandage was placed over the procedure site and patient was transferred out of the office in s                I reviewed the patient's past medications, allergies, past medical history, past surgical history, family history and social history during this visit      Physical Exam:    Vitals: BP (!) 145/89 (Site: Left Upper Arm,
with 1ml kenalog and 4 ml lidocaine. Tolerated well   no complications   instructions given. If not return to clinic in 3 months for diabetes hypertension follow-up          Attending Physician Statement  I have discussed the case, including pertinent history and exam findings with the resident. I also have seen the patient and performed key portions of the examination. I agree with the documented assessment and plan.

## 2023-12-27 DIAGNOSIS — I1A.0 RESISTANT HYPERTENSION: ICD-10-CM

## 2023-12-27 RX ORDER — LISINOPRIL 40 MG/1
40 TABLET ORAL DAILY
Qty: 90 TABLET | Refills: 1 | Status: SHIPPED | OUTPATIENT
Start: 2023-12-27 | End: 2024-06-24

## 2023-12-27 RX ORDER — HYDRALAZINE HYDROCHLORIDE 25 MG/1
TABLET, FILM COATED ORAL
Qty: 270 TABLET | Refills: 1 | Status: SHIPPED | OUTPATIENT
Start: 2023-12-27

## 2023-12-28 ENCOUNTER — HOSPITAL ENCOUNTER (OUTPATIENT)
Dept: PHYSICAL THERAPY | Age: 63
Setting detail: THERAPIES SERIES
Discharge: HOME | End: 2023-12-28
Payer: MEDICAID

## 2023-12-28 PROCEDURE — 97161 PT EVAL LOW COMPLEX 20 MIN: CPT | Performed by: PHYSICAL THERAPIST

## 2023-12-28 NOTE — PROGRESS NOTES
Physical Therapy: Initial Evaluation    Patient: José Miguel Veloz (55 y.o. male)   Examination Date:   Plan of Care Certification Period: 2023 to        :  1960 ;    Confirmed: Yes MRN: [id number]  CSN: [id number]   Insurance: Payor: [facility] [employer] / Plan: [facility] [employer] [place] / Product Type: *No Product type* /   Insurance ID: [id number] - (Medicaid Managed) Secondary Insurance (if applicable):    Referring Physician: Dylon Livingston,*     PCP: Buddie Crigler, Marvie Eve, MD Visits to Date/Visits Approved:     No Show/Cancelled Appts:   /       Medical Diagnosis: Unilateral primary osteoarthritis, right knee [M17.11]    Treatment Diagnosis:       PERTINENT MEDICAL HISTORY           Medical History: Chart Reviewed: Yes   Past Medical History:   Diagnosis Date    Asthma     Chronic back pain     COPD (chronic obstructive pulmonary disease) ([address])     Depression     Gout     Headache     Hypertension     Osteoarthritis      Surgical History:   Past Surgical History:   Procedure Laterality Date    COLONOSCOPY      [place] for BRBPR    COLONOSCOPY  2017       Medications:   Current Outpatient Medications:     hydrALAZINE (APRESOLINE) 25 MG tablet, TAKE 1 TABLET BY MOUTH THREE TIMES DAILY, Disp: 270 tablet, Rfl: 1    lisinopril (PRINIVIL;ZESTRIL) 40 MG tablet, TAKE 1 TABLET BY MOUTH DAILY, Disp: 90 tablet, Rfl: 1    SYMBICORT 80-4.5 MCG/ACT AERO, Inhale 2 puffs into the lungs 2 times daily, Disp: 10.2 g, Rfl: 3    albuterol (PROVENTIL) (2.5 MG/3ML) 0.083% nebulizer solution, Take 3 mLs by nebulization every 4 hours as needed for Wheezing, Disp: 3 mL, Rfl: 2    diclofenac sodium (VOLTAREN) 1 % GEL, Apply 2 g topically 4 times daily as needed for Pain, Disp: 50 g, Rfl: 1    allopurinol (ZYLOPRIM) 100 MG tablet, TAKE 1 TABLET BY MOUTH TWICE DAILY, Disp: 180 tablet, Rfl: 1    carvedilol (COREG) 25 MG tablet, TAKE 1 TABLET BY MOUTH TWICE

## 2024-03-13 DIAGNOSIS — M1A.0710 CHRONIC GOUT OF RIGHT FOOT, UNSPECIFIED CAUSE: ICD-10-CM

## 2024-03-14 RX ORDER — ALLOPURINOL 100 MG/1
TABLET ORAL
Qty: 180 TABLET | Refills: 1 | Status: SHIPPED | OUTPATIENT
Start: 2024-03-14

## 2024-03-29 RX ORDER — ALBUTEROL SULFATE 90 UG/1
2 AEROSOL, METERED RESPIRATORY (INHALATION) EVERY 6 HOURS PRN
Qty: 18 G | Refills: 3 | Status: SHIPPED | OUTPATIENT
Start: 2024-03-29

## 2024-05-01 RX ORDER — ALBUTEROL SULFATE 2.5 MG/3ML
SOLUTION RESPIRATORY (INHALATION)
Qty: 75 ML | Refills: 1 | Status: SHIPPED | OUTPATIENT
Start: 2024-05-01 | End: 2024-07-30

## 2024-05-01 RX ORDER — DILTIAZEM HYDROCHLORIDE 60 MG/1
2 TABLET, FILM COATED ORAL 2 TIMES DAILY
Qty: 10.2 G | Refills: 3 | Status: SHIPPED | OUTPATIENT
Start: 2024-05-01

## 2024-06-11 DIAGNOSIS — I1A.0 RESISTANT HYPERTENSION: ICD-10-CM

## 2024-06-12 RX ORDER — HYDRALAZINE HYDROCHLORIDE 25 MG/1
TABLET, FILM COATED ORAL
Qty: 270 TABLET | Refills: 1 | Status: SHIPPED | OUTPATIENT
Start: 2024-06-12

## 2024-06-12 RX ORDER — LISINOPRIL 40 MG/1
40 TABLET ORAL DAILY
Qty: 90 TABLET | Refills: 1 | Status: SHIPPED | OUTPATIENT
Start: 2024-06-12 | End: 2024-12-09

## 2024-06-12 RX ORDER — CARVEDILOL 25 MG/1
TABLET ORAL
Qty: 180 TABLET | Refills: 1 | Status: SHIPPED | OUTPATIENT
Start: 2024-06-12

## 2024-09-27 ENCOUNTER — TELEPHONE (OUTPATIENT)
Dept: FAMILY MEDICINE CLINIC | Age: 64
End: 2024-09-27

## 2024-09-27 DIAGNOSIS — J45.40 MODERATE PERSISTENT ASTHMA WITHOUT COMPLICATION: Primary | ICD-10-CM

## 2024-09-27 RX ORDER — ALBUTEROL SULFATE 90 UG/1
2 AEROSOL, METERED RESPIRATORY (INHALATION) EVERY 6 HOURS PRN
Qty: 18 G | Refills: 3 | Status: SHIPPED | OUTPATIENT
Start: 2024-09-27

## 2024-09-27 RX ORDER — ALBUTEROL SULFATE 0.83 MG/ML
2.5 SOLUTION RESPIRATORY (INHALATION) EVERY 4 HOURS PRN
Qty: 3 ML | Refills: 1 | Status: SHIPPED | OUTPATIENT
Start: 2024-09-27

## 2024-10-11 NOTE — PROGRESS NOTES
[facility]  FAMILY MEDICINE RESIDENCY PROGRAM  DATE OF VISIT : 10/15/2024    Patient : Celestino Whelan   Age : 64 y.o.    : 1960   MRN : [id number]   ______________________________________________________________________    Chief Complaint:   Chief Complaint   Patient presents with    Medication Refill    Check-Up       HPI:   History obtained from the patient. Celestino Whelan is a 64 y.o. male who presents to clinic for establishing care. Interpretor in Luxembourgish was used during the entire encounter.    Resistant Hypertension Follow-up  Compliance with treatment has been excellent. 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 Blood pressure is well controlled at home. On average, blood pressure is: 135/89. Patient denies: chest pain, chest pressure/discomfort, dyspnea, irregular heart beat, and palpitations.     Regimen:  Hypertension Medications       ACE Inhibitors       lisinopril (PRINIVIL;ZESTRIL) 40 MG tablet TAKE 1 TABLET BY MOUTH DAILY       Calcium Channel Blockers       amLODIPine (NORVASC) 10 MG tablet Take 1 tablet by mouth daily       Antiadrenergic Antihypertensives       doxazosin (CARDURA) 4 MG tablet TAKE 1 TABLET BY MOUTH EVERY NIGHT       Vasodilators       hydrALAZINE (APRESOLINE) 25 MG tablet TAKE 1 TABLET BY MOUTH THREE TIMES DAILY       Thiazides and Thiazide-Like Diuretics       hydroCHLOROthiazide (HYDRODIURIL) 25 MG tablet Take 1 tablet by mouth daily       Beta Blockers Cardio-Selective       metoprolol succinate (TOPROL XL) 100 MG extended release tablet Take 1 tablet by mouth daily          Moderate Persistent Asthma with Complication  Taking medications as prescribed. No problem. Last exacerbation was in , in [place]. Use Albuterol nebulizer as needed. Does not wake up during the night with breathing problems. Is currently using Symbicort, and in the past, was using Flovent.  Most of the night:

## 2024-10-15 ENCOUNTER — OFFICE VISIT (OUTPATIENT)
Dept: FAMILY MEDICINE CLINIC | Age: 64
End: 2024-10-15
Payer: MEDICAID

## 2024-10-15 VITALS
TEMPERATURE: 99.3 F | HEART RATE: 70 BPM | WEIGHT: 189 LBS | SYSTOLIC BLOOD PRESSURE: 147 MMHG | HEIGHT: 63 IN | RESPIRATION RATE: 18 BRPM | OXYGEN SATURATION: 96 % | BODY MASS INDEX: 33.49 KG/M2 | DIASTOLIC BLOOD PRESSURE: 88 MMHG

## 2024-10-15 DIAGNOSIS — M17.11 PRIMARY OSTEOARTHRITIS OF RIGHT KNEE: ICD-10-CM

## 2024-10-15 DIAGNOSIS — E11.9 TYPE 2 DIABETES MELLITUS WITHOUT COMPLICATION, WITHOUT LONG-TERM CURRENT USE OF INSULIN (HCC): ICD-10-CM

## 2024-10-15 DIAGNOSIS — Z23 NEED FOR PNEUMOCOCCAL 20-VALENT CONJUGATE VACCINATION: ICD-10-CM

## 2024-10-15 DIAGNOSIS — Z23 NEED FOR INFLUENZA VACCINATION: ICD-10-CM

## 2024-10-15 DIAGNOSIS — Z13.220 LIPID SCREENING: ICD-10-CM

## 2024-10-15 DIAGNOSIS — J45.40 MODERATE PERSISTENT ASTHMA WITHOUT COMPLICATION: ICD-10-CM

## 2024-10-15 DIAGNOSIS — E66.09 CLASS 1 OBESITY DUE TO EXCESS CALORIES WITH BODY MASS INDEX (BMI) OF 32.0 TO 32.9 IN ADULT, UNSPECIFIED WHETHER SERIOUS COMORBIDITY PRESENT: ICD-10-CM

## 2024-10-15 DIAGNOSIS — I1A.0 RESISTANT HYPERTENSION: Primary | ICD-10-CM

## 2024-10-15 DIAGNOSIS — B35.3 TINEA PEDIS OF BOTH FEET: ICD-10-CM

## 2024-10-15 DIAGNOSIS — E66.811 CLASS 1 OBESITY DUE TO EXCESS CALORIES WITH BODY MASS INDEX (BMI) OF 32.0 TO 32.9 IN ADULT, UNSPECIFIED WHETHER SERIOUS COMORBIDITY PRESENT: ICD-10-CM

## 2024-10-15 DIAGNOSIS — M10.471 ACUTE GOUT DUE TO OTHER SECONDARY CAUSE INVOLVING TOE OF RIGHT FOOT: ICD-10-CM

## 2024-10-15 LAB
ALBUMIN: 4.3 G/DL (ref 3.5–5.2)
ALP BLD-CCNC: 92 U/L (ref 40–129)
ALT SERPL-CCNC: 42 U/L (ref 0–40)
ANION GAP SERPL CALCULATED.3IONS-SCNC: 12 MMOL/L (ref 7–16)
AST SERPL-CCNC: 40 U/L (ref 0–39)
BILIRUB SERPL-MCNC: 0.4 MG/DL (ref 0–1.2)
BUN BLDV-MCNC: 14 MG/DL (ref 6–23)
CALCIUM SERPL-MCNC: 9.5 MG/DL (ref 8.6–10.2)
CHLORIDE BLD-SCNC: 99 MMOL/L (ref 98–107)
CHOLESTEROL, TOTAL: 163 MG/DL
CO2: 23 MMOL/L (ref 22–29)
CREAT SERPL-MCNC: 0.6 MG/DL (ref 0.7–1.2)
GFR, ESTIMATED: >90 ML/MIN/1.73M2
GLUCOSE BLD-MCNC: 315 MG/DL (ref 74–99)
HBA1C MFR BLD: 9.4 % (ref 4–5.6)
HDLC SERPL-MCNC: 43 MG/DL
LDL CHOLESTEROL: 72 MG/DL
POTASSIUM SERPL-SCNC: 4.4 MMOL/L (ref 3.5–5)
SODIUM BLD-SCNC: 134 MMOL/L (ref 132–146)
TOTAL PROTEIN: 7.6 G/DL (ref 6.4–8.3)
TRIGL SERPL-MCNC: 239 MG/DL
VLDLC SERPL CALC-MCNC: 48 MG/DL

## 2024-10-15 PROCEDURE — 90677 PCV20 VACCINE IM: CPT | Performed by: FAMILY MEDICINE

## 2024-10-15 PROCEDURE — 90472 IMMUNIZATION ADMIN EACH ADD: CPT | Performed by: FAMILY MEDICINE

## 2024-10-15 PROCEDURE — 90471 IMMUNIZATION ADMIN: CPT | Performed by: FAMILY MEDICINE

## 2024-10-15 PROCEDURE — 90661 CCIIV3 VAC ABX FR 0.5 ML IM: CPT | Performed by: FAMILY MEDICINE

## 2024-10-15 RX ORDER — CLOTRIMAZOLE 1 %
CREAM (GRAM) TOPICAL
Qty: 1 EACH | Refills: 1 | Status: SHIPPED | OUTPATIENT
Start: 2024-10-15 | End: 2024-10-22

## 2024-10-15 RX ORDER — METOPROLOL SUCCINATE 100 MG/1
100 TABLET, EXTENDED RELEASE ORAL DAILY
Qty: 30 TABLET | Refills: 3 | Status: SHIPPED | OUTPATIENT
Start: 2024-10-15

## 2024-10-15 ASSESSMENT — ENCOUNTER SYMPTOMS
EYE REDNESS: 0
BLOOD IN STOOL: 0
EYE ITCHING: 0
FACIAL SWELLING: 0
DIARRHEA: 0
EYE PAIN: 0
VOMITING: 0
SHORTNESS OF BREATH: 0
CONSTIPATION: 0
CHEST TIGHTNESS: 0
NAUSEA: 0
COUGH: 0
CHOKING: 0
EYE DISCHARGE: 0

## 2024-10-15 ASSESSMENT — PATIENT HEALTH QUESTIONNAIRE - PHQ9
SUM OF ALL RESPONSES TO PHQ QUESTIONS 1-9: 0
SUM OF ALL RESPONSES TO PHQ9 QUESTIONS 1 & 2: 0
SUM OF ALL RESPONSES TO PHQ QUESTIONS 1-9: 0
SUM OF ALL RESPONSES TO PHQ QUESTIONS 1-9: 0
1. LITTLE INTEREST OR PLEASURE IN DOING THINGS: NOT AT ALL
2. FEELING DOWN, DEPRESSED OR HOPELESS: NOT AT ALL
SUM OF ALL RESPONSES TO PHQ QUESTIONS 1-9: 0

## 2024-10-15 NOTE — PROGRESS NOTES
S: 64 y.o. male who presents to establish care and to discuss his chronic diseases which have been well controlled.      Hypertension is fairly well controlled at home.  Home checks are in the 140s/80s.  He does take amlodipine, doxazosin, carvedilol, lisinopril, hydralazine and HCTZ.  Strong FH of hypertension.  Non smoker. No alcohol use.  No other drug use.      He is a diabetic, taking metformin.  No N/D or other side effects.  He is due for diabetic foot exam.  Not following a diabetic diet.  Did have diabetes ed previously.  He is on lipitor 10 mg daily and ACE inhibitor.  Not following with a ophthalmology.  Agrees to referral.      History of asthma as well.  His last exacerbation was in 2000.  He is using his medications and doing well.  He uses symbicort twice daily.  He is using albuterol inhaler sometimes 2 times daily.      Takes allopurinol for gout.  No concerns.    Prior diagnosis in 2020 of KAUSHIK.  Was told to use CPAP at that time @ 10 cm water pressure.  He notes that he does use his CPAP most nights.     \"Please provide patient with CPAP @ 10 cm water pressure with a Respironics DreamWear full facemask, size medium wide with headgear and heated humidification and EPR of 3   Please also provide new masks, tubing, filters, head gear, and water chambers as needed.   Please connect in [employer] or Care  to Avril Prasad RN   Diagnosis-KAUSHIK    Length of need 99 months   Orders faxed to [facility]   NPI- [id number]\"--from pulmonology note 10/20/20     Due for labs.    O: VS: BP (!) 147/88 (Site: Right Upper Arm, Position: Sitting, Cuff Size: Medium Adult)   Pulse 70   Temp 99.3 °F (37.4 °C) (Temporal)   Resp 18   Ht 1.6 m (5' 3\")   Wt 85.7 kg (189 lb)   SpO2 96%   BMI 33.48 kg/m²    General: NAD, appropriate affect and grooming, obese   Neck:  non tender; no lymphadenopathy   CV:  RRR, no gallops, rubs, or murmurs   Resp: CTAB   Abd:  Soft, nontender, normal BSx 4 quadrants   Ext:  No

## 2024-10-15 NOTE — PATIENT INSTRUCTIONS
Por favor, deje de aguilar carvedilol.  Comience a aguilar toprol xl lety vez al día para ayers presión arterial.  Continúe controlando ayers presión arterial en casa.  La PA objetivo es <130/80.

## 2024-10-16 ENCOUNTER — TELEPHONE (OUTPATIENT)
Dept: FAMILY MEDICINE CLINIC | Age: 64
End: 2024-10-16

## 2024-10-16 NOTE — TELEPHONE ENCOUNTER
Called the patient.  Discussed lab results (CMP, lipid panel, and Hemoglobin A1C).   Regarding the lipid panel, reiterate the importance of lifestyle modifications including diet changes and exercise.  Regarding the glucose level and Hb A1C, advice patient to increase dose of Metformin, 500 mg, 2 times per day. Described how to take it: one in the morning during the breakfast and one in the evening during the dinner.  Patient denied having any diarrhea.  Patient verbalized understanding and agreement with this plan.  All questions were answered.   Next appointment on: 11.21.2024.

## 2024-12-30 ENCOUNTER — TELEPHONE (OUTPATIENT)
Dept: FAMILY MEDICINE CLINIC | Age: 64
End: 2024-12-30

## 2024-12-30 NOTE — TELEPHONE ENCOUNTER
Pt's dtr,Isabella, called today for refills for pt.  Said pt needs his BP med (metoprolol) and all of his asthma meds, including inhaler. Dtr did not give much info on meds, said pt does not speak English, call her w/questions.    Danilo Godfrey

## 2024-12-31 ENCOUNTER — TELEPHONE (OUTPATIENT)
Dept: FAMILY MEDICINE CLINIC | Age: 64
End: 2024-12-31

## 2024-12-31 DIAGNOSIS — J45.40 MODERATE PERSISTENT ASTHMA WITHOUT COMPLICATION: ICD-10-CM

## 2024-12-31 DIAGNOSIS — I1A.0 RESISTANT HYPERTENSION: ICD-10-CM

## 2024-12-31 RX ORDER — ALBUTEROL SULFATE 0.83 MG/ML
2.5 SOLUTION RESPIRATORY (INHALATION) EVERY 4 HOURS PRN
Qty: 3 ML | Refills: 3 | Status: SHIPPED | OUTPATIENT
Start: 2024-12-31

## 2024-12-31 RX ORDER — DILTIAZEM HYDROCHLORIDE 60 MG/1
2 TABLET, FILM COATED ORAL 2 TIMES DAILY
Qty: 10.2 G | Refills: 3 | Status: SHIPPED | OUTPATIENT
Start: 2024-12-31

## 2024-12-31 RX ORDER — METOPROLOL SUCCINATE 100 MG/1
100 TABLET, EXTENDED RELEASE ORAL DAILY
Qty: 30 TABLET | Refills: 3 | Status: SHIPPED | OUTPATIENT
Start: 2024-12-31

## 2024-12-31 RX ORDER — ALBUTEROL SULFATE 90 UG/1
2 AEROSOL, METERED RESPIRATORY (INHALATION) EVERY 6 HOURS PRN
Qty: 18 G | Refills: 3 | Status: SHIPPED | OUTPATIENT
Start: 2024-12-31

## 2024-12-31 NOTE — TELEPHONE ENCOUNTER
Called the patient with the aid of the hospital  services, to clarify what he needs help with. Patient was not available.  Left him  a voicemail asking him to call back as son as he gets the chance.

## 2024-12-31 NOTE — TELEPHONE ENCOUNTER
Last Appointment:  10/15/2024  Future Appointments   Date Time Provider Department Center   1/7/2025 11:00 AM Kary Alcantar MD [facility]

## 2025-01-01 RX ORDER — METOPROLOL SUCCINATE 100 MG/1
100 TABLET, EXTENDED RELEASE ORAL DAILY
Qty: 30 TABLET | Refills: 3 | OUTPATIENT
Start: 2025-01-01

## 2025-01-02 NOTE — TELEPHONE ENCOUNTER
Name of Medication(s) Requested:  Requested Prescriptions     Refused Prescriptions Disp Refills    metoprolol succinate (TOPROL XL) 100 MG extended release tablet 30 tablet 3     Sig: Take 1 tablet by mouth daily     Refused By: GAYLA MONTERROSO     Reason for Refusal: Duplicate Request       Medication is on current medication list Yes    Dosage and directions were verified? Yes    Quantity verified: 30 day supply     Pharmacy Verified?  Yes    Last Appointment:  10/15/2024    Future appts:  Future Appointments   Date Time Provider Department Center   1/7/2025 11:00 AM Kary Alcantar MD [facility] DEP        (If no appt send self scheduling link. .REFILLAPPT)  Scheduling request sent?     [] Yes  [x] No    Does patient need updated?  [] Yes  [] No

## 2025-01-07 ENCOUNTER — OFFICE VISIT (OUTPATIENT)
Dept: FAMILY MEDICINE CLINIC | Age: 65
End: 2025-01-07
Payer: MEDICAID

## 2025-01-07 VITALS
RESPIRATION RATE: 18 BRPM | SYSTOLIC BLOOD PRESSURE: 173 MMHG | DIASTOLIC BLOOD PRESSURE: 89 MMHG | OXYGEN SATURATION: 96 % | WEIGHT: 178 LBS | HEIGHT: 63 IN | BODY MASS INDEX: 31.54 KG/M2 | HEART RATE: 77 BPM | TEMPERATURE: 97.7 F

## 2025-01-07 DIAGNOSIS — B35.3 TINEA PEDIS OF BOTH FEET: ICD-10-CM

## 2025-01-07 DIAGNOSIS — I1A.0 RESISTANT HYPERTENSION: ICD-10-CM

## 2025-01-07 DIAGNOSIS — I10 ESSENTIAL HYPERTENSION: ICD-10-CM

## 2025-01-07 DIAGNOSIS — J45.40 MODERATE PERSISTENT ASTHMA WITHOUT COMPLICATION: ICD-10-CM

## 2025-01-07 DIAGNOSIS — E11.9 TYPE 2 DIABETES MELLITUS WITHOUT COMPLICATION, WITHOUT LONG-TERM CURRENT USE OF INSULIN (HCC): Primary | ICD-10-CM

## 2025-01-07 PROCEDURE — 1036F TOBACCO NON-USER: CPT

## 2025-01-07 PROCEDURE — 99214 OFFICE O/P EST MOD 30 MIN: CPT

## 2025-01-07 PROCEDURE — G8417 CALC BMI ABV UP PARAM F/U: HCPCS

## 2025-01-07 PROCEDURE — M1308 PR FLU IMMUNIZE NO ADMIN: HCPCS

## 2025-01-07 PROCEDURE — 2022F DILAT RTA XM EVC RTNOPTHY: CPT

## 2025-01-07 PROCEDURE — 3077F SYST BP >= 140 MM HG: CPT

## 2025-01-07 PROCEDURE — 3017F COLORECTAL CA SCREEN DOC REV: CPT

## 2025-01-07 PROCEDURE — 3046F HEMOGLOBIN A1C LEVEL >9.0%: CPT

## 2025-01-07 PROCEDURE — 3079F DIAST BP 80-89 MM HG: CPT

## 2025-01-07 PROCEDURE — G8428 CUR MEDS NOT DOCUMENT: HCPCS

## 2025-01-07 SDOH — ECONOMIC STABILITY: INCOME INSECURITY: HOW HARD IS IT FOR YOU TO PAY FOR THE VERY BASICS LIKE FOOD, HOUSING, MEDICAL CARE, AND HEATING?: NOT VERY HARD

## 2025-01-07 SDOH — ECONOMIC STABILITY: FOOD INSECURITY: WITHIN THE PAST 12 MONTHS, THE FOOD YOU BOUGHT JUST DIDN'T LAST AND YOU DIDN'T HAVE MONEY TO GET MORE.: NEVER TRUE

## 2025-01-07 SDOH — ECONOMIC STABILITY: FOOD INSECURITY: WITHIN THE PAST 12 MONTHS, YOU WORRIED THAT YOUR FOOD WOULD RUN OUT BEFORE YOU GOT MONEY TO BUY MORE.: NEVER TRUE

## 2025-01-07 ASSESSMENT — PATIENT HEALTH QUESTIONNAIRE - PHQ9
SUM OF ALL RESPONSES TO PHQ QUESTIONS 1-9: 0
2. FEELING DOWN, DEPRESSED OR HOPELESS: NOT AT ALL
SUM OF ALL RESPONSES TO PHQ QUESTIONS 1-9: 0
SUM OF ALL RESPONSES TO PHQ QUESTIONS 1-9: 0
1. LITTLE INTEREST OR PLEASURE IN DOING THINGS: NOT AT ALL
SUM OF ALL RESPONSES TO PHQ9 QUESTIONS 1 & 2: 0
SUM OF ALL RESPONSES TO PHQ QUESTIONS 1-9: 0

## 2025-01-07 ASSESSMENT — ENCOUNTER SYMPTOMS
STRIDOR: 0
RECTAL PAIN: 0
WHEEZING: 0
CONSTIPATION: 0
COUGH: 0
DIARRHEA: 0
BLOOD IN STOOL: 0

## 2025-01-07 NOTE — PROGRESS NOTES
DM   feeling wwell on metformin increase  Didn't schedule retinal exam yet   says he will do it this month    Reiterated importance of diet and exercise    HTN   mistakenly stopped most of his medications     has been only on Toprol XL, so BP is elevated at clinic although he thought it was 130/90 at home    today we are verifying his correct compliance, and will ask him to keep a written record    Tinea pedis   resolved    Asthma    no CP or SOB nor coughing     no night symptoms        on albuterol and symbicort    Blood pressure (!) 173/89, pulse 77, temperature 97.7 °F (36.5 °C), temperature source Temporal, resp. rate 18, height 1.6 m (5' 3\"), weight 80.7 kg (178 lb), SpO2 96%.    HEENT WNL     Heart regular    Lungs clear    abd non-tender      No edema    Pulses intact   tinea pedis resolved    A/P  HTN    add HCTZ to his current Toprol  Asthma   continue current rx  DM          \"  \"   \"    schedule ophto appt    Attending Physician Statement  I have discussed the case, including pertinent history and exam findings with the resident.  I also have seen the patient and performed key portions of the examination.  I agree with the documented assessment and plan.

## 2025-01-07 NOTE — PROGRESS NOTES
[facility]  FAMILY MEDICINE RESIDENCY PROGRAM  DATE OF VISIT : 2025    Patient : Celestino Whelan   Age : 64 y.o.    : 1960   MRN : [id number]   ______________________________________________________________________    Chief Complaint:   Chief Complaint   Patient presents with    1 Month Follow-Up     HTN      Hypertension     X 2.       HPI:   History obtained from the patient. Celestino Whelan is a 64 y.o. male who presents to clinic for follow-up on hypertension and diabetes mellitus.    Diabetes Mellitus Follow-up  Patient increase Metformin dose to 500mg 2 times per day with one in the morning during the breakfast and one in the evening during the dinner. No issues with medications. Patient has had no hypoglycemic episodes.  Previous encounter, referral for Ophtalmology made for diabetic retinal exam, patient did not schedule an appointment because he was in [place].    Regimen:  Diabetic Medications       Biguanides       metFORMIN (GLUCOPHAGE) 500 MG tablet Take 1 tablet by mouth daily (with breakfast)            Resistant Hypertension Follow-up  Previous encounter, changed Carvedilol (Coreg) to Metoprolol Succinate (Toprol XL), patient has been compliant.  Misunderstanding, patient stopped all the other blood pressure medications (Lisinopril 40 mg, Amlodipine 10 mg, Doxazosin 4 mg, and Hydralazine 25 mg). Discussed with patient the new plan.  He is taking blood pressure at home 120/90 this morning. Has been recording his blood pressure values at home.   Patient denies: chest pain, exertional chest pressure/discomfort, irregular heart beat, and palpitations.     Regimen:  Hypertension Medications       Thiazides and Thiazide-Like Diuretics       hydroCHLOROthiazide (HYDRODIURIL) 25 MG tablet Take 1 tablet by mouth daily       Beta Blockers Cardio-Selective       metoprolol succinate (TOPROL XL) 100 MG extended release tablet Take 1 tablet by mouth daily

## 2025-02-01 ENCOUNTER — APPOINTMENT (OUTPATIENT)
Dept: CT IMAGING | Age: 65
End: 2025-02-01
Payer: MEDICAID

## 2025-02-01 ENCOUNTER — HOSPITAL ENCOUNTER (OUTPATIENT)
Age: 65
Setting detail: OBSERVATION
Discharge: HOME | End: 2025-02-04
Attending: EMERGENCY MEDICINE | Admitting: HOSPITALIST
Payer: MEDICAID

## 2025-02-01 DIAGNOSIS — R73.9 HYPERGLYCEMIA: Primary | ICD-10-CM

## 2025-02-01 DIAGNOSIS — R79.89 ELEVATED LACTIC ACID LEVEL: ICD-10-CM

## 2025-02-01 LAB
ALBUMIN SERPL-MCNC: 4.4 G/DL (ref 3.5–5.2)
ALP SERPL-CCNC: 97 U/L (ref 40–129)
ALT SERPL-CCNC: 24 U/L (ref 0–40)
ANION GAP SERPL CALCULATED.3IONS-SCNC: 13 MMOL/L (ref 7–16)
ANION GAP SERPL CALCULATED.3IONS-SCNC: 16 MMOL/L (ref 7–16)
AST SERPL-CCNC: 18 U/L (ref 0–39)
BASOPHILS # BLD: 0.07 K/UL (ref 0–0.2)
BASOPHILS NFR BLD: 1 % (ref 0–2)
BILIRUB SERPL-MCNC: 0.6 MG/DL (ref 0–1.2)
BUN SERPL-MCNC: 20 MG/DL (ref 6–23)
BUN SERPL-MCNC: 23 MG/DL (ref 6–23)
CALCIUM SERPL-MCNC: 8.6 MG/DL (ref 8.6–10.2)
CALCIUM SERPL-MCNC: 9.6 MG/DL (ref 8.6–10.2)
CHLORIDE SERPL-SCNC: 90 MMOL/L (ref 98–107)
CHLORIDE SERPL-SCNC: 97 MMOL/L (ref 98–107)
CO2 SERPL-SCNC: 22 MMOL/L (ref 22–29)
CO2 SERPL-SCNC: 23 MMOL/L (ref 22–29)
CREAT SERPL-MCNC: 0.8 MG/DL (ref 0.7–1.2)
CREAT SERPL-MCNC: 1.1 MG/DL (ref 0.7–1.2)
EOSINOPHIL # BLD: 0.12 K/UL (ref 0.05–0.5)
EOSINOPHILS RELATIVE PERCENT: 1 % (ref 0–6)
ERYTHROCYTE [DISTWIDTH] IN BLOOD BY AUTOMATED COUNT: 11.8 % (ref 11.5–15)
GFR, ESTIMATED: 72 ML/MIN/1.73M2
GFR, ESTIMATED: >90 ML/MIN/1.73M2
GLUCOSE BLD-MCNC: 299 MG/DL (ref 74–99)
GLUCOSE BLD-MCNC: 329 MG/DL (ref 74–99)
GLUCOSE BLD-MCNC: 372 MG/DL (ref 74–99)
GLUCOSE SERPL-MCNC: 412 MG/DL (ref 74–99)
GLUCOSE SERPL-MCNC: 666 MG/DL (ref 74–99)
HCT VFR BLD AUTO: 44.5 % (ref 37–54)
HGB BLD-MCNC: 14.9 G/DL (ref 12.5–16.5)
IMM GRANULOCYTES # BLD AUTO: 0.03 K/UL (ref 0–0.58)
IMM GRANULOCYTES NFR BLD: 0 % (ref 0–5)
LACTATE BLDV-SCNC: 2.1 MMOL/L (ref 0.5–1.9)
LACTATE BLDV-SCNC: 2.3 MMOL/L (ref 0.5–1.9)
LIPASE SERPL-CCNC: 101 U/L (ref 13–60)
LYMPHOCYTES NFR BLD: 1.37 K/UL (ref 1.5–4)
LYMPHOCYTES RELATIVE PERCENT: 11 % (ref 20–42)
MAGNESIUM SERPL-MCNC: 2.3 MG/DL (ref 1.6–2.6)
MCH RBC QN AUTO: 28.3 PG (ref 26–35)
MCHC RBC AUTO-ENTMCNC: 33.5 G/DL (ref 32–34.5)
MCV RBC AUTO: 84.6 FL (ref 80–99.9)
MONOCYTES NFR BLD: 0.72 K/UL (ref 0.1–0.95)
MONOCYTES NFR BLD: 6 % (ref 2–12)
NEUTROPHILS NFR BLD: 81 % (ref 43–80)
NEUTS SEG NFR BLD: 9.97 K/UL (ref 1.8–7.3)
PLATELET # BLD AUTO: 270 K/UL (ref 130–450)
PMV BLD AUTO: 11.3 FL (ref 7–12)
POTASSIUM SERPL-SCNC: 4.3 MMOL/L (ref 3.5–5)
POTASSIUM SERPL-SCNC: 5.2 MMOL/L (ref 3.5–5)
PROT SERPL-MCNC: 8.1 G/DL (ref 6.4–8.3)
RBC # BLD AUTO: 5.26 M/UL (ref 3.8–5.8)
SODIUM SERPL-SCNC: 128 MMOL/L (ref 132–146)
SODIUM SERPL-SCNC: 133 MMOL/L (ref 132–146)
WBC OTHER # BLD: 12.3 K/UL (ref 4.5–11.5)

## 2025-02-01 PROCEDURE — 82962 GLUCOSE BLOOD TEST: CPT

## 2025-02-01 PROCEDURE — 83036 HEMOGLOBIN GLYCOSYLATED A1C: CPT

## 2025-02-01 PROCEDURE — 6360000004 HC RX CONTRAST MEDICATION: Performed by: RADIOLOGY

## 2025-02-01 PROCEDURE — 85025 COMPLETE CBC W/AUTO DIFF WBC: CPT

## 2025-02-01 PROCEDURE — 2580000003 HC RX 258

## 2025-02-01 PROCEDURE — 99285 EMERGENCY DEPT VISIT HI MDM: CPT

## 2025-02-01 PROCEDURE — 83735 ASSAY OF MAGNESIUM: CPT

## 2025-02-01 PROCEDURE — 83690 ASSAY OF LIPASE: CPT

## 2025-02-01 PROCEDURE — 74177 CT ABD & PELVIS W/CONTRAST: CPT

## 2025-02-01 PROCEDURE — 80048 BASIC METABOLIC PNL TOTAL CA: CPT

## 2025-02-01 PROCEDURE — 83605 ASSAY OF LACTIC ACID: CPT

## 2025-02-01 PROCEDURE — 6370000000 HC RX 637 (ALT 250 FOR IP)

## 2025-02-01 PROCEDURE — 80053 COMPREHEN METABOLIC PANEL: CPT

## 2025-02-01 RX ORDER — 0.9 % SODIUM CHLORIDE 0.9 %
1000 INTRAVENOUS SOLUTION INTRAVENOUS ONCE
Status: COMPLETED | OUTPATIENT
Start: 2025-02-01 | End: 2025-02-01

## 2025-02-01 RX ORDER — IOPAMIDOL 755 MG/ML
75 INJECTION, SOLUTION INTRAVASCULAR
Status: COMPLETED | OUTPATIENT
Start: 2025-02-01 | End: 2025-02-01

## 2025-02-01 RX ORDER — 0.9 % SODIUM CHLORIDE 0.9 %
500 INTRAVENOUS SOLUTION INTRAVENOUS ONCE
Status: COMPLETED | OUTPATIENT
Start: 2025-02-01 | End: 2025-02-02

## 2025-02-01 RX ORDER — INSULIN LISPRO 100 [IU]/ML
10 INJECTION, SOLUTION INTRAVENOUS; SUBCUTANEOUS ONCE
Status: COMPLETED | OUTPATIENT
Start: 2025-02-01 | End: 2025-02-01

## 2025-02-01 RX ORDER — INSULIN LISPRO 100 [IU]/ML
8 INJECTION, SOLUTION INTRAVENOUS; SUBCUTANEOUS ONCE
Status: COMPLETED | OUTPATIENT
Start: 2025-02-01 | End: 2025-02-01

## 2025-02-01 RX ADMIN — SODIUM CHLORIDE 1000 ML: 9 INJECTION, SOLUTION INTRAVENOUS at 20:17

## 2025-02-01 RX ADMIN — INSULIN LISPRO 8 UNITS: 100 INJECTION, SOLUTION INTRAVENOUS; SUBCUTANEOUS at 20:18

## 2025-02-01 RX ADMIN — INSULIN LISPRO 10 UNITS: 100 INJECTION, SOLUTION INTRAVENOUS; SUBCUTANEOUS at 22:59

## 2025-02-01 RX ADMIN — SODIUM CHLORIDE 500 ML: 9 INJECTION, SOLUTION INTRAVENOUS at 22:44

## 2025-02-01 RX ADMIN — IOPAMIDOL 75 ML: 755 INJECTION, SOLUTION INTRAVENOUS at 19:33

## 2025-02-01 RX ADMIN — SODIUM CHLORIDE 1000 ML: 9 INJECTION, SOLUTION INTRAVENOUS at 17:17

## 2025-02-01 ASSESSMENT — PAIN DESCRIPTION - PAIN TYPE: TYPE: ACUTE PAIN

## 2025-02-01 ASSESSMENT — PAIN DESCRIPTION - FREQUENCY: FREQUENCY: INTERMITTENT

## 2025-02-01 ASSESSMENT — PAIN DESCRIPTION - DESCRIPTORS: DESCRIPTORS: ACHING

## 2025-02-01 ASSESSMENT — LIFESTYLE VARIABLES
HOW OFTEN DO YOU HAVE A DRINK CONTAINING ALCOHOL: NEVER
HOW MANY STANDARD DRINKS CONTAINING ALCOHOL DO YOU HAVE ON A TYPICAL DAY: PATIENT DOES NOT DRINK

## 2025-02-01 ASSESSMENT — PAIN SCALES - GENERAL: PAINLEVEL_OUTOF10: 7

## 2025-02-01 ASSESSMENT — PAIN DESCRIPTION - LOCATION: LOCATION: ABDOMEN

## 2025-02-01 ASSESSMENT — PAIN DESCRIPTION - ORIENTATION: ORIENTATION: RIGHT;UPPER

## 2025-02-01 ASSESSMENT — PAIN - FUNCTIONAL ASSESSMENT: PAIN_FUNCTIONAL_ASSESSMENT: ACTIVITIES ARE NOT PREVENTED

## 2025-02-01 NOTE — ED PROVIDER NOTES
[facility] EMERGENCY DEPARTMENT  EMERGENCY DEPARTMENT ENCOUNTER        Pt Name: Celestino Whelan  MRN: [id number]  Birthdate 1960  Date of evaluation: 2/1/2025  Provider: Blank Jorgensen DO  PCP: Kary Alcantar MD  Note Started: 5:35 PM EST 2/1/25    CHIEF COMPLAINT       Chief Complaint   Patient presents with    Abdominal Pain     Ongoing for 2 weeks , RUQ pain. Unable to eat as much as before, still has gallbladder     Sweats       HISTORY OF PRESENT ILLNESS: 1 or more Elements   Celestino Whelan is a 64 y.o. male who presents to the emergency department with chief complaint of 2 weeks of upper abdominal pain.  Patient states that he has had ongoing right upper quadrant to epigastric abdominal pain.  He has been able to tolerate less and less p.o. intake throughout this time.  He does endorse some nausea but has not had any episodes of emesis.  States that he has not had recorded fevers or chills at home but does note that he has been sweating more frequently.  Patient denies a medical history of abdominal surgeries, pancreatitis, or gallbladder pathologies.  He also denies alcohol intake, diarrhea, melena, chest pain, shortness of breath, fevers, chills, or lower extremity edema.    Nursing Notes were all reviewed and agreed with or any disagreements were addressed in the HPI.    REVIEW OF SYSTEMS :    Positives and Pertinent negatives as per HPI.    PAST MEDICAL HISTORY/Chronic Conditions Affecting Care    has a past medical history of Asthma, Chronic back pain, COPD (chronic obstructive pulmonary disease) (HCC), Depression, Gout, Hypertension, Obesity (BMI 30.0-34.9), Osteoarthritis, and Tinea pedis of both feet.     SURGICAL HISTORY     Past Surgical History:   Procedure Laterality Date    CIRCUMCISION      COLONOSCOPY  2000    [place] for BRBPR    COLONOSCOPY  04/13/2017       CURRENTMEDICATIONS       Previous Medications    ALBUTEROL (PROVENTIL) (2.5

## 2025-02-02 PROBLEM — Z91.119 DIETARY NONCOMPLIANCE: Status: ACTIVE | Noted: 2025-02-02

## 2025-02-02 PROBLEM — E11.65 TYPE 2 DIABETES MELLITUS WITH HYPERGLYCEMIA (HCC): Chronic | Status: ACTIVE | Noted: 2025-02-02

## 2025-02-02 PROBLEM — R73.9 HYPERGLYCEMIA: Status: ACTIVE | Noted: 2025-02-02

## 2025-02-02 PROBLEM — E11.65 POORLY CONTROLLED TYPE 2 DIABETES MELLITUS (HCC): Status: ACTIVE | Noted: 2023-01-31

## 2025-02-02 PROBLEM — E11.65 TYPE 2 DIABETES MELLITUS WITH HYPERGLYCEMIA (HCC): Status: ACTIVE | Noted: 2025-02-02

## 2025-02-02 PROBLEM — N48.0 LICHEN SCLEROSUS OF PENIS: Status: ACTIVE | Noted: 2022-11-16

## 2025-02-02 LAB
ANION GAP SERPL CALCULATED.3IONS-SCNC: 13 MMOL/L (ref 7–16)
BASOPHILS # BLD: 0.06 K/UL (ref 0–0.2)
BASOPHILS NFR BLD: 1 % (ref 0–2)
BUN SERPL-MCNC: 19 MG/DL (ref 6–23)
CALCIUM SERPL-MCNC: 8.9 MG/DL (ref 8.6–10.2)
CHLORIDE SERPL-SCNC: 104 MMOL/L (ref 98–107)
CO2 SERPL-SCNC: 22 MMOL/L (ref 22–29)
CREAT SERPL-MCNC: 0.7 MG/DL (ref 0.7–1.2)
EOSINOPHIL # BLD: 0.3 K/UL (ref 0.05–0.5)
EOSINOPHILS RELATIVE PERCENT: 3 % (ref 0–6)
ERYTHROCYTE [DISTWIDTH] IN BLOOD BY AUTOMATED COUNT: 12.1 % (ref 11.5–15)
GFR, ESTIMATED: >90 ML/MIN/1.73M2
GLUCOSE BLD-MCNC: 162 MG/DL (ref 74–99)
GLUCOSE BLD-MCNC: 195 MG/DL (ref 74–99)
GLUCOSE BLD-MCNC: 238 MG/DL (ref 74–99)
GLUCOSE BLD-MCNC: 243 MG/DL (ref 74–99)
GLUCOSE BLD-MCNC: 267 MG/DL (ref 74–99)
GLUCOSE SERPL-MCNC: 195 MG/DL (ref 74–99)
HBA1C MFR BLD: 12.3 % (ref 4–5.6)
HCT VFR BLD AUTO: 42 % (ref 37–54)
HGB BLD-MCNC: 14.5 G/DL (ref 12.5–16.5)
IMM GRANULOCYTES # BLD AUTO: 0.04 K/UL (ref 0–0.58)
IMM GRANULOCYTES NFR BLD: 0 % (ref 0–5)
LACTATE BLDV-SCNC: 2.1 MMOL/L (ref 0.5–2.2)
LACTATE BLDV-SCNC: 2.4 MMOL/L (ref 0.5–2.2)
LYMPHOCYTES NFR BLD: 2.62 K/UL (ref 1.5–4)
LYMPHOCYTES RELATIVE PERCENT: 25 % (ref 20–42)
MCH RBC QN AUTO: 29.4 PG (ref 26–35)
MCHC RBC AUTO-ENTMCNC: 34.5 G/DL (ref 32–34.5)
MCV RBC AUTO: 85.2 FL (ref 80–99.9)
MONOCYTES NFR BLD: 0.82 K/UL (ref 0.1–0.95)
MONOCYTES NFR BLD: 8 % (ref 2–12)
NEUTROPHILS NFR BLD: 64 % (ref 43–80)
NEUTS SEG NFR BLD: 6.77 K/UL (ref 1.8–7.3)
PLATELET # BLD AUTO: 249 K/UL (ref 130–450)
PMV BLD AUTO: 10.9 FL (ref 7–12)
POTASSIUM SERPL-SCNC: 4 MMOL/L (ref 3.5–5)
RBC # BLD AUTO: 4.93 M/UL (ref 3.8–5.8)
SODIUM SERPL-SCNC: 139 MMOL/L (ref 132–146)
WBC OTHER # BLD: 10.6 K/UL (ref 4.5–11.5)

## 2025-02-02 PROCEDURE — 80048 BASIC METABOLIC PNL TOTAL CA: CPT

## 2025-02-02 PROCEDURE — 85025 COMPLETE CBC W/AUTO DIFF WBC: CPT

## 2025-02-02 PROCEDURE — 99223 1ST HOSP IP/OBS HIGH 75: CPT | Performed by: HOSPITALIST

## 2025-02-02 PROCEDURE — 6370000000 HC RX 637 (ALT 250 FOR IP): Performed by: INTERNAL MEDICINE

## 2025-02-02 PROCEDURE — 2580000003 HC RX 258: Performed by: HOSPITALIST

## 2025-02-02 PROCEDURE — 83605 ASSAY OF LACTIC ACID: CPT

## 2025-02-02 PROCEDURE — 84681 ASSAY OF C-PEPTIDE: CPT

## 2025-02-02 PROCEDURE — 96372 THER/PROPH/DIAG INJ SC/IM: CPT

## 2025-02-02 PROCEDURE — 2500000003 HC RX 250 WO HCPCS: Performed by: HOSPITALIST

## 2025-02-02 PROCEDURE — 6370000000 HC RX 637 (ALT 250 FOR IP): Performed by: HOSPITALIST

## 2025-02-02 PROCEDURE — 99232 SBSQ HOSP IP/OBS MODERATE 35: CPT | Performed by: INTERNAL MEDICINE

## 2025-02-02 PROCEDURE — 82962 GLUCOSE BLOOD TEST: CPT

## 2025-02-02 PROCEDURE — 6360000002 HC RX W HCPCS: Performed by: HOSPITALIST

## 2025-02-02 PROCEDURE — G0378 HOSPITAL OBSERVATION PER HR: HCPCS

## 2025-02-02 RX ORDER — INSULIN LISPRO 100 [IU]/ML
3 INJECTION, SOLUTION INTRAVENOUS; SUBCUTANEOUS
Status: DISCONTINUED | OUTPATIENT
Start: 2025-02-02 | End: 2025-02-03

## 2025-02-02 RX ORDER — INSULIN LISPRO 100 [IU]/ML
0-4 INJECTION, SOLUTION INTRAVENOUS; SUBCUTANEOUS
Status: DISCONTINUED | OUTPATIENT
Start: 2025-02-02 | End: 2025-02-02

## 2025-02-02 RX ORDER — SODIUM CHLORIDE, SODIUM LACTATE, POTASSIUM CHLORIDE, CALCIUM CHLORIDE 600; 310; 30; 20 MG/100ML; MG/100ML; MG/100ML; MG/100ML
INJECTION, SOLUTION INTRAVENOUS CONTINUOUS
Status: ACTIVE | OUTPATIENT
Start: 2025-02-02 | End: 2025-02-03

## 2025-02-02 RX ORDER — SODIUM CHLORIDE 0.9 % (FLUSH) 0.9 %
5-40 SYRINGE (ML) INJECTION EVERY 12 HOURS SCHEDULED
Status: DISCONTINUED | OUTPATIENT
Start: 2025-02-02 | End: 2025-02-04 | Stop reason: HOSPADM

## 2025-02-02 RX ORDER — MAGNESIUM SULFATE IN WATER 40 MG/ML
2000 INJECTION, SOLUTION INTRAVENOUS PRN
Status: DISCONTINUED | OUTPATIENT
Start: 2025-02-02 | End: 2025-02-04 | Stop reason: HOSPADM

## 2025-02-02 RX ORDER — DEXTROSE MONOHYDRATE 100 MG/ML
INJECTION, SOLUTION INTRAVENOUS CONTINUOUS PRN
Status: DISCONTINUED | OUTPATIENT
Start: 2025-02-02 | End: 2025-02-04 | Stop reason: HOSPADM

## 2025-02-02 RX ORDER — SODIUM CHLORIDE 9 MG/ML
INJECTION, SOLUTION INTRAVENOUS PRN
Status: DISCONTINUED | OUTPATIENT
Start: 2025-02-02 | End: 2025-02-04 | Stop reason: HOSPADM

## 2025-02-02 RX ORDER — INSULIN LISPRO 100 [IU]/ML
4 INJECTION, SOLUTION INTRAVENOUS; SUBCUTANEOUS
Status: DISCONTINUED | OUTPATIENT
Start: 2025-02-02 | End: 2025-02-02

## 2025-02-02 RX ORDER — INSULIN GLARGINE 100 [IU]/ML
9 INJECTION, SOLUTION SUBCUTANEOUS NIGHTLY
Status: DISCONTINUED | OUTPATIENT
Start: 2025-02-02 | End: 2025-02-03

## 2025-02-02 RX ORDER — ATORVASTATIN CALCIUM 10 MG/1
10 TABLET, FILM COATED ORAL DAILY
Status: DISCONTINUED | OUTPATIENT
Start: 2025-02-02 | End: 2025-02-04 | Stop reason: HOSPADM

## 2025-02-02 RX ORDER — ONDANSETRON 2 MG/ML
4 INJECTION INTRAMUSCULAR; INTRAVENOUS EVERY 6 HOURS PRN
Status: DISCONTINUED | OUTPATIENT
Start: 2025-02-02 | End: 2025-02-04 | Stop reason: HOSPADM

## 2025-02-02 RX ORDER — ALLOPURINOL 100 MG/1
100 TABLET ORAL 2 TIMES DAILY
Status: DISCONTINUED | OUTPATIENT
Start: 2025-02-02 | End: 2025-02-04 | Stop reason: HOSPADM

## 2025-02-02 RX ORDER — ENOXAPARIN SODIUM 100 MG/ML
40 INJECTION SUBCUTANEOUS DAILY
Status: DISCONTINUED | OUTPATIENT
Start: 2025-02-02 | End: 2025-02-04 | Stop reason: HOSPADM

## 2025-02-02 RX ORDER — POTASSIUM CHLORIDE 7.45 MG/ML
10 INJECTION INTRAVENOUS PRN
Status: DISCONTINUED | OUTPATIENT
Start: 2025-02-02 | End: 2025-02-04 | Stop reason: HOSPADM

## 2025-02-02 RX ORDER — GLUCAGON 1 MG/ML
1 KIT INJECTION PRN
Status: DISCONTINUED | OUTPATIENT
Start: 2025-02-02 | End: 2025-02-04 | Stop reason: HOSPADM

## 2025-02-02 RX ORDER — INSULIN LISPRO 100 [IU]/ML
0-6 INJECTION, SOLUTION INTRAVENOUS; SUBCUTANEOUS
Status: DISCONTINUED | OUTPATIENT
Start: 2025-02-02 | End: 2025-02-03

## 2025-02-02 RX ORDER — ALBUTEROL SULFATE 0.83 MG/ML
2.5 SOLUTION RESPIRATORY (INHALATION) EVERY 4 HOURS PRN
Status: DISCONTINUED | OUTPATIENT
Start: 2025-02-02 | End: 2025-02-04 | Stop reason: HOSPADM

## 2025-02-02 RX ORDER — 0.9 % SODIUM CHLORIDE 0.9 %
500 INTRAVENOUS SOLUTION INTRAVENOUS ONCE
Status: COMPLETED | OUTPATIENT
Start: 2025-02-02 | End: 2025-02-02

## 2025-02-02 RX ORDER — ACETAMINOPHEN 325 MG/1
650 TABLET ORAL EVERY 6 HOURS PRN
Status: DISCONTINUED | OUTPATIENT
Start: 2025-02-02 | End: 2025-02-04 | Stop reason: HOSPADM

## 2025-02-02 RX ORDER — INSULIN GLARGINE 100 [IU]/ML
8 INJECTION, SOLUTION SUBCUTANEOUS NIGHTLY
Status: DISCONTINUED | OUTPATIENT
Start: 2025-02-02 | End: 2025-02-02

## 2025-02-02 RX ORDER — ONDANSETRON 4 MG/1
4 TABLET, ORALLY DISINTEGRATING ORAL EVERY 8 HOURS PRN
Status: DISCONTINUED | OUTPATIENT
Start: 2025-02-02 | End: 2025-02-04 | Stop reason: HOSPADM

## 2025-02-02 RX ORDER — SODIUM CHLORIDE 0.9 % (FLUSH) 0.9 %
5-40 SYRINGE (ML) INJECTION PRN
Status: DISCONTINUED | OUTPATIENT
Start: 2025-02-02 | End: 2025-02-04 | Stop reason: HOSPADM

## 2025-02-02 RX ORDER — ACETAMINOPHEN 650 MG/1
650 SUPPOSITORY RECTAL EVERY 6 HOURS PRN
Status: DISCONTINUED | OUTPATIENT
Start: 2025-02-02 | End: 2025-02-04 | Stop reason: HOSPADM

## 2025-02-02 RX ORDER — METOPROLOL SUCCINATE 100 MG/1
100 TABLET, EXTENDED RELEASE ORAL DAILY
Status: DISCONTINUED | OUTPATIENT
Start: 2025-02-02 | End: 2025-02-04 | Stop reason: HOSPADM

## 2025-02-02 RX ORDER — POTASSIUM CHLORIDE 1500 MG/1
40 TABLET, EXTENDED RELEASE ORAL PRN
Status: DISCONTINUED | OUTPATIENT
Start: 2025-02-02 | End: 2025-02-04 | Stop reason: HOSPADM

## 2025-02-02 RX ORDER — SENNOSIDES A AND B 8.6 MG/1
1 TABLET, FILM COATED ORAL DAILY PRN
Status: DISCONTINUED | OUTPATIENT
Start: 2025-02-02 | End: 2025-02-04 | Stop reason: HOSPADM

## 2025-02-02 RX ORDER — MAGNESIUM HYDROXIDE/ALUMINUM HYDROXICE/SIMETHICONE 120; 1200; 1200 MG/30ML; MG/30ML; MG/30ML
30 SUSPENSION ORAL EVERY 6 HOURS PRN
Status: DISCONTINUED | OUTPATIENT
Start: 2025-02-02 | End: 2025-02-04 | Stop reason: HOSPADM

## 2025-02-02 RX ADMIN — INSULIN LISPRO 2 UNITS: 100 INJECTION, SOLUTION INTRAVENOUS; SUBCUTANEOUS at 21:06

## 2025-02-02 RX ADMIN — SODIUM CHLORIDE 500 ML: 9 INJECTION, SOLUTION INTRAVENOUS at 02:11

## 2025-02-02 RX ADMIN — INSULIN GLARGINE 8 UNITS: 100 INJECTION, SOLUTION SUBCUTANEOUS at 02:23

## 2025-02-02 RX ADMIN — SODIUM CHLORIDE, POTASSIUM CHLORIDE, SODIUM LACTATE AND CALCIUM CHLORIDE: 600; 310; 30; 20 INJECTION, SOLUTION INTRAVENOUS at 05:16

## 2025-02-02 RX ADMIN — INSULIN LISPRO 4 UNITS: 100 INJECTION, SOLUTION INTRAVENOUS; SUBCUTANEOUS at 08:59

## 2025-02-02 RX ADMIN — SODIUM CHLORIDE, PRESERVATIVE FREE 10 ML: 5 INJECTION INTRAVENOUS at 08:59

## 2025-02-02 RX ADMIN — METOPROLOL SUCCINATE 100 MG: 100 TABLET, FILM COATED, EXTENDED RELEASE ORAL at 08:58

## 2025-02-02 RX ADMIN — SODIUM CHLORIDE, PRESERVATIVE FREE 10 ML: 5 INJECTION INTRAVENOUS at 21:07

## 2025-02-02 RX ADMIN — ALLOPURINOL 100 MG: 100 TABLET ORAL at 08:58

## 2025-02-02 RX ADMIN — ALLOPURINOL 100 MG: 100 TABLET ORAL at 21:07

## 2025-02-02 RX ADMIN — INSULIN LISPRO 4 UNITS: 100 INJECTION, SOLUTION INTRAVENOUS; SUBCUTANEOUS at 12:55

## 2025-02-02 RX ADMIN — ENOXAPARIN SODIUM 40 MG: 100 INJECTION SUBCUTANEOUS at 08:58

## 2025-02-02 RX ADMIN — INSULIN LISPRO 2 UNITS: 100 INJECTION, SOLUTION INTRAVENOUS; SUBCUTANEOUS at 12:54

## 2025-02-02 RX ADMIN — INSULIN LISPRO 2 UNITS: 100 INJECTION, SOLUTION INTRAVENOUS; SUBCUTANEOUS at 17:48

## 2025-02-02 RX ADMIN — ACETAMINOPHEN 650 MG: 325 TABLET ORAL at 02:23

## 2025-02-02 RX ADMIN — INSULIN LISPRO 3 UNITS: 100 INJECTION, SOLUTION INTRAVENOUS; SUBCUTANEOUS at 17:49

## 2025-02-02 RX ADMIN — INSULIN GLARGINE 9 UNITS: 100 INJECTION, SOLUTION SUBCUTANEOUS at 21:06

## 2025-02-02 RX ADMIN — ATORVASTATIN CALCIUM 10 MG: 10 TABLET, FILM COATED ORAL at 08:58

## 2025-02-02 ASSESSMENT — PAIN SCALES - GENERAL
PAINLEVEL_OUTOF10: 0
PAINLEVEL_OUTOF10: 2
PAINLEVEL_OUTOF10: 3

## 2025-02-02 ASSESSMENT — PAIN DESCRIPTION - LOCATION: LOCATION: HEAD

## 2025-02-02 NOTE — H&P
[facility] Hospitalist Group History and Physical      ASSESSMENT:      Principal Problem:    Type 2 diabetes mellitus with hyperglycemia (HCC)  Active Problems:    Gout    Moderate persistent asthma    Resistant hypertension    Hyperlipidemia    Obstructive sleep apnea syndrome    Class 1 obesity due to excess calories with body mass index (BMI) of 31.0 to 31.9 in adult    Osteoarthritis  Resolved Problems:    * No resolved hospital problems. *      PLAN:    Type 2 diabetes with hyperglycemia: Previously controlled with metformin 500 mg daily.  Recently metformin titrated to 500 mg twice daily.  Polydipsia & polyuria.  C-peptide added on.  Consult endocrinology & diabetic educator.  Start low-dose glargine & sliding scale insulin with Accu-Cheks.  Dyspepsia: Start empiric PPI.  Follow-up as an outpatient for EGD.  Intercritical gout: Continue allopurinol 200 mg  Resistant hypertension: Continue home Toprol-XL.  Hold HCTZ for now.  Moderate asthma: Not in exacerbation, as needed nebs  Other chronic co-morbidities were stable, continue home medications unless otherwise specified.    Consultations Ordered:  IP CONSULT TO ENDOCRINOLOGY  IP CONSULT TO DIABETES EDUCATOR    Code Status: Full Code  VTE prophylaxis: Lovenox  Dispo: obs patient is traveling back to [place] tomorrow anxious about being discharged today.      CHIEF COMPLAINT:  had concerns including Abdominal Pain (Ongoing for 2 weeks , RUQ pain. Unable to eat as much as before, still has gallbladder ) and Sweats.    History of Present Illness:      64 y.o. male PMH of NIDDM, Resistant HTN, HLD, Gout & KAUSHIK presented to the ED for 2 weeks of upper abdominal pain, primarily burning intermittent epigastric region with heartburn. He reported decreased tolerance to oral intake and mild nausea, but no vomiting.  Patient was unaware his blood sugars were running high.  He does not check his blood sugar at home.  He is experienced increased thirst &

## 2025-02-02 NOTE — CONSULTS
ENDOCRINOLOGY INITIAL CONSULTATION NOTE      Date of admission: 2/1/2025  Date of service: 2/2/2025  Admitting physician: Frederick Jean DO   Primary Care Physician: Kary Alcantar MD  Consultant physician: Ryan Velásquez MD     Reason for the consultation:  Uncontrolled DM    History of Present Illness:  The history is provided by the patient. Accuracy of the patient data is excellent    Celestino Whelan is a very pleasant 64 y.o. old male with PMH of type 2 diabetes, hypertension, hyperlipidemia, gout and other listed below admitted to [facility] on 2/1/2025 because of upper abdominal pain and hyperglycemia, endocrine service was consulted for diabetes management.  He denies any history of fever, chills, chest pain or shortness of breath.  Admission labs showed a glucose level of 666, anion gap 16, bicarb 22, creatinine 1.1, potassium 5.2 and an A1c of 12.3%.    Prior to admission  The patient was diagnosed with type 2 diabetes few years ago.  Prior to the admission he was on metformin 500 mg daily.  The patient admits poor compliance with following diabetic diet prior to admission.  He also was not very consistent with checking glucose prior to admission.  The patient denies any microvascular or macrovascular diabetes complications.  He is overdue with diabetic eye exam.  Lab Results   Component Value Date/Time    LABA1C 12.3 02/01/2025 05:14 PM       Inpatient diet:   Carb Restricted diet     Point of care glucose monitoring   (Independently reviewed)   Recent Labs     02/01/25  2144 02/01/25  2246 02/01/25  2325 02/02/25  0218 02/02/25  0628 02/02/25  1200   POCGLU 299* 329* 372* 195* 162* 267*       Past medical history:   Past Medical History:   Diagnosis Date    Asthma     Chronic back pain     COPD (chronic obstructive pulmonary disease) (HCC)     Depression     Gout     Hypertension     Obesity (BMI 30.0-34.9)     Osteoarthritis     Right knee    Tinea pedis of both feet        Past surgical

## 2025-02-02 NOTE — ED NOTES
ED to Inpatient Handoff Report    Notified [facility] that electronic handoff available and patient ready for transport to room 718.    Safety Risks: None identified    Patient in Restraints: no    Constant Observer or Patient : no    Telemetry Monitoring Ordered :No           Order to transfer to unit without monitor:YES    Last MEWS: 1 Time completed: 0115    Deterioration Index Score:   Predictive Model Details          22 (Normal)  Factor Value    Calculated 2/2/2025 01:14 54% Age 64 years old    Deterioration Index Model 15% Systolic 156     13% WBC count abnormal (12.3 k/uL)     9% Potassium 4.3 mmol/L     5% Sodium 133 mmol/L     3% Pulse oximetry 99 %     1% Pulse 82     0% Temperature 97.9 °F (36.6 °C)     0% Hematocrit 44.5 %     0% Respiratory rate 16        Vitals:    02/01/25 1624 02/01/25 1625 02/02/25 0045   BP:  (!) 129/90 (!) 156/94   Pulse:  87 82   Resp:  18 16   Temp:  97.9 °F (36.6 °C)    SpO2:  99% 99%   Weight: 81.2 kg (179 lb)     Height: 1.6 m (5' 3\")           Opportunity for questions and clarification was provided.

## 2025-02-02 NOTE — PLAN OF CARE
Problem: Chronic Conditions and Co-morbidities  Goal: Patient's chronic conditions and co-morbidity symptoms are monitored and maintained or improved  2/2/2025 1050 by Leslie Hartley RN  Outcome: Progressing  2/2/2025 0240 by Rahel Charles RN  Outcome: Progressing  Flowsheets (Taken 2/2/2025 0223)  Care Plan - Patient's Chronic Conditions and Co-Morbidity Symptoms are Monitored and Maintained or Improved: Monitor and assess patient's chronic conditions and comorbid symptoms for stability, deterioration, or improvement     Problem: Discharge Planning  Goal: Discharge to home or other facility with appropriate resources  2/2/2025 1050 by Leslie Hartley RN  Outcome: Progressing  2/2/2025 0240 by Rahel Charles RN  Outcome: Progressing  Flowsheets (Taken 2/2/2025 0223)  Discharge to home or other facility with appropriate resources: Identify barriers to discharge with patient and caregiver     Problem: Pain  Goal: Verbalizes/displays adequate comfort level or baseline comfort level  2/2/2025 1050 by Leslie Hartley RN  Outcome: Progressing  2/2/2025 0240 by Rhael Charles, RN  Outcome: Progressing

## 2025-02-03 LAB
ALBUMIN SERPL-MCNC: 3.7 G/DL (ref 3.5–5.2)
ALP SERPL-CCNC: 68 U/L (ref 40–129)
ALT SERPL-CCNC: 19 U/L (ref 0–40)
ANION GAP SERPL CALCULATED.3IONS-SCNC: 9 MMOL/L (ref 7–16)
AST SERPL-CCNC: 24 U/L (ref 0–39)
BASOPHILS # BLD: 0.05 K/UL (ref 0–0.2)
BASOPHILS NFR BLD: 1 % (ref 0–2)
BILIRUB SERPL-MCNC: 0.7 MG/DL (ref 0–1.2)
BUN SERPL-MCNC: 13 MG/DL (ref 6–23)
CALCIUM SERPL-MCNC: 8.6 MG/DL (ref 8.6–10.2)
CHLORIDE SERPL-SCNC: 105 MMOL/L (ref 98–107)
CO2 SERPL-SCNC: 23 MMOL/L (ref 22–29)
CREAT SERPL-MCNC: 0.6 MG/DL (ref 0.7–1.2)
EOSINOPHIL # BLD: 0.42 K/UL (ref 0.05–0.5)
EOSINOPHILS RELATIVE PERCENT: 5 % (ref 0–6)
ERYTHROCYTE [DISTWIDTH] IN BLOOD BY AUTOMATED COUNT: 11.9 % (ref 11.5–15)
GFR, ESTIMATED: >90 ML/MIN/1.73M2
GLUCOSE BLD-MCNC: 216 MG/DL (ref 74–99)
GLUCOSE BLD-MCNC: 244 MG/DL (ref 74–99)
GLUCOSE BLD-MCNC: 254 MG/DL (ref 74–99)
GLUCOSE BLD-MCNC: 281 MG/DL (ref 74–99)
GLUCOSE SERPL-MCNC: 237 MG/DL (ref 74–99)
HCT VFR BLD AUTO: 41.8 % (ref 37–54)
HGB BLD-MCNC: 14 G/DL (ref 12.5–16.5)
IMM GRANULOCYTES # BLD AUTO: <0.03 K/UL (ref 0–0.58)
IMM GRANULOCYTES NFR BLD: 0 % (ref 0–5)
LYMPHOCYTES NFR BLD: 2.25 K/UL (ref 1.5–4)
LYMPHOCYTES RELATIVE PERCENT: 29 % (ref 20–42)
MCH RBC QN AUTO: 29.4 PG (ref 26–35)
MCHC RBC AUTO-ENTMCNC: 33.5 G/DL (ref 32–34.5)
MCV RBC AUTO: 87.6 FL (ref 80–99.9)
MONOCYTES NFR BLD: 0.58 K/UL (ref 0.1–0.95)
MONOCYTES NFR BLD: 7 % (ref 2–12)
NEUTROPHILS NFR BLD: 58 % (ref 43–80)
NEUTS SEG NFR BLD: 4.5 K/UL (ref 1.8–7.3)
PLATELET # BLD AUTO: 202 K/UL (ref 130–450)
PMV BLD AUTO: 10.6 FL (ref 7–12)
POTASSIUM SERPL-SCNC: 3.9 MMOL/L (ref 3.5–5)
PROT SERPL-MCNC: 6.7 G/DL (ref 6.4–8.3)
RBC # BLD AUTO: 4.77 M/UL (ref 3.8–5.8)
SODIUM SERPL-SCNC: 137 MMOL/L (ref 132–146)
WBC OTHER # BLD: 7.8 K/UL (ref 4.5–11.5)

## 2025-02-03 PROCEDURE — 6370000000 HC RX 637 (ALT 250 FOR IP): Performed by: INTERNAL MEDICINE

## 2025-02-03 PROCEDURE — G0378 HOSPITAL OBSERVATION PER HR: HCPCS

## 2025-02-03 PROCEDURE — 99232 SBSQ HOSP IP/OBS MODERATE 35: CPT | Performed by: INTERNAL MEDICINE

## 2025-02-03 PROCEDURE — 6360000002 HC RX W HCPCS: Performed by: HOSPITALIST

## 2025-02-03 PROCEDURE — 85025 COMPLETE CBC W/AUTO DIFF WBC: CPT

## 2025-02-03 PROCEDURE — 6370000000 HC RX 637 (ALT 250 FOR IP): Performed by: HOSPITALIST

## 2025-02-03 PROCEDURE — 99232 SBSQ HOSP IP/OBS MODERATE 35: CPT | Performed by: STUDENT IN AN ORGANIZED HEALTH CARE EDUCATION/TRAINING PROGRAM

## 2025-02-03 PROCEDURE — 82962 GLUCOSE BLOOD TEST: CPT

## 2025-02-03 PROCEDURE — 2500000003 HC RX 250 WO HCPCS: Performed by: HOSPITALIST

## 2025-02-03 PROCEDURE — 96372 THER/PROPH/DIAG INJ SC/IM: CPT

## 2025-02-03 PROCEDURE — 6370000000 HC RX 637 (ALT 250 FOR IP)

## 2025-02-03 PROCEDURE — 80053 COMPREHEN METABOLIC PANEL: CPT

## 2025-02-03 RX ORDER — INSULIN LISPRO 100 [IU]/ML
0-8 INJECTION, SOLUTION INTRAVENOUS; SUBCUTANEOUS
Status: DISCONTINUED | OUTPATIENT
Start: 2025-02-03 | End: 2025-02-04 | Stop reason: HOSPADM

## 2025-02-03 RX ORDER — INSULIN GLARGINE 100 [IU]/ML
16 INJECTION, SOLUTION SUBCUTANEOUS NIGHTLY
Status: DISCONTINUED | OUTPATIENT
Start: 2025-02-03 | End: 2025-02-04

## 2025-02-03 RX ORDER — INSULIN GLARGINE 100 [IU]/ML
14 INJECTION, SOLUTION SUBCUTANEOUS NIGHTLY
Status: DISCONTINUED | OUTPATIENT
Start: 2025-02-03 | End: 2025-02-03

## 2025-02-03 RX ORDER — INSULIN LISPRO 100 [IU]/ML
5 INJECTION, SOLUTION INTRAVENOUS; SUBCUTANEOUS
Status: DISCONTINUED | OUTPATIENT
Start: 2025-02-03 | End: 2025-02-04

## 2025-02-03 RX ADMIN — ATORVASTATIN CALCIUM 10 MG: 10 TABLET, FILM COATED ORAL at 08:15

## 2025-02-03 RX ADMIN — INSULIN LISPRO 4 UNITS: 100 INJECTION, SOLUTION INTRAVENOUS; SUBCUTANEOUS at 20:34

## 2025-02-03 RX ADMIN — INSULIN GLARGINE 16 UNITS: 100 INJECTION, SOLUTION SUBCUTANEOUS at 20:35

## 2025-02-03 RX ADMIN — ALLOPURINOL 100 MG: 100 TABLET ORAL at 08:15

## 2025-02-03 RX ADMIN — INSULIN LISPRO 2 UNITS: 100 INJECTION, SOLUTION INTRAVENOUS; SUBCUTANEOUS at 08:16

## 2025-02-03 RX ADMIN — ALLOPURINOL 100 MG: 100 TABLET ORAL at 20:34

## 2025-02-03 RX ADMIN — INSULIN LISPRO 4 UNITS: 100 INJECTION, SOLUTION INTRAVENOUS; SUBCUTANEOUS at 11:50

## 2025-02-03 RX ADMIN — INSULIN LISPRO 3 UNITS: 100 INJECTION, SOLUTION INTRAVENOUS; SUBCUTANEOUS at 08:16

## 2025-02-03 RX ADMIN — SODIUM CHLORIDE, PRESERVATIVE FREE 10 ML: 5 INJECTION INTRAVENOUS at 20:38

## 2025-02-03 RX ADMIN — METOPROLOL SUCCINATE 100 MG: 100 TABLET, FILM COATED, EXTENDED RELEASE ORAL at 08:15

## 2025-02-03 RX ADMIN — ENOXAPARIN SODIUM 40 MG: 100 INJECTION SUBCUTANEOUS at 08:15

## 2025-02-03 RX ADMIN — INSULIN LISPRO 5 UNITS: 100 INJECTION, SOLUTION INTRAVENOUS; SUBCUTANEOUS at 11:50

## 2025-02-03 RX ADMIN — INSULIN LISPRO 2 UNITS: 100 INJECTION, SOLUTION INTRAVENOUS; SUBCUTANEOUS at 18:05

## 2025-02-03 RX ADMIN — INSULIN LISPRO 5 UNITS: 100 INJECTION, SOLUTION INTRAVENOUS; SUBCUTANEOUS at 18:05

## 2025-02-03 ASSESSMENT — PAIN SCALES - GENERAL: PAINLEVEL_OUTOF10: 0

## 2025-02-03 NOTE — CARE COORDINATION
Introduced my self and provided explanation of CM role to patient. Patient is awake, alert, and aware of current diagnosis and discharge planning. Patient is from home independently. Patient states he is established with a pcp and has no discharge needs. Patient states his son in law will transport home.   Electronically signed by Chata Servin RN on 2/3/2025 at 10:00 AM

## 2025-02-03 NOTE — PLAN OF CARE
Problem: Chronic Conditions and Co-morbidities  Goal: Patient's chronic conditions and co-morbidity symptoms are monitored and maintained or improved  2/2/2025 2239 by Rahel Charles, RN  Outcome: Progressing  Flowsheets (Taken 2/2/2025 2238)  Care Plan - Patient's Chronic Conditions and Co-Morbidity Symptoms are Monitored and Maintained or Improved: Monitor and assess patient's chronic conditions and comorbid symptoms for stability, deterioration, or improvement  2/2/2025 1050 by Leslie Hartley RN  Outcome: Progressing     Problem: Discharge Planning  Goal: Discharge to home or other facility with appropriate resources  2/2/2025 2239 by Rahel Charles, RN  Outcome: Progressing  Flowsheets (Taken 2/2/2025 2238)  Discharge to home or other facility with appropriate resources: Identify barriers to discharge with patient and caregiver  2/2/2025 1050 by Leslie Hartley RN  Outcome: Progressing     Problem: Pain  Goal: Verbalizes/displays adequate comfort level or baseline comfort level  2/2/2025 2239 by Rahel Charles, RN  Outcome: Progressing  2/2/2025 1050 by Leslie Hartley RN  Outcome: Progressing

## 2025-02-03 NOTE — PATIENT CARE CONFERENCE
P Quality Flow/Interdisciplinary Rounds Progress Note        Quality Flow Rounds held on February 3, 2025    Disciplines Attending:  Bedside Nurse, , , and Nursing Unit Leadership    Celestino Whelan was admitted on 2/1/2025  4:42 PM    Anticipated Discharge Date:       Disposition:    Yoseph Score:  Yoseph Scale Score: 22    [facility] RISK OF UNPLANNED READMISSION 2.0             0 Total Score        Discussed patient goal for the day, patient clinical progression, and barriers to discharge.  The following Goal(s) of the Day/Commitment(s) have been identified:  discharge plan is home. Needs to see diabetic educator.       Yanira Sexton, RN  February 3, 2025

## 2025-02-03 NOTE — ACP (ADVANCE CARE PLANNING)
Advance Care Planning   Healthcare Decision Maker:    Primary Decision Maker: Juliet Altmanica - Child - [phone]    Click here to complete Healthcare Decision Makers including selection of the Healthcare Decision Maker Relationship (ie \"Primary\").

## 2025-02-04 VITALS
TEMPERATURE: 97.7 F | RESPIRATION RATE: 16 BRPM | HEART RATE: 65 BPM | HEIGHT: 63 IN | OXYGEN SATURATION: 100 % | DIASTOLIC BLOOD PRESSURE: 93 MMHG | SYSTOLIC BLOOD PRESSURE: 153 MMHG | BODY MASS INDEX: 30.7 KG/M2 | WEIGHT: 173.28 LBS

## 2025-02-04 LAB
C PEPTIDE SERPL-MCNC: 1.5 NG/ML
GLUCOSE BLD-MCNC: 137 MG/DL (ref 74–99)
GLUCOSE BLD-MCNC: 177 MG/DL (ref 74–99)
GLUCOSE BLD-MCNC: 203 MG/DL (ref 74–99)
GLUCOSE BLD-MCNC: 246 MG/DL (ref 74–99)

## 2025-02-04 PROCEDURE — 96372 THER/PROPH/DIAG INJ SC/IM: CPT

## 2025-02-04 PROCEDURE — 6360000002 HC RX W HCPCS: Performed by: HOSPITALIST

## 2025-02-04 PROCEDURE — G0378 HOSPITAL OBSERVATION PER HR: HCPCS

## 2025-02-04 PROCEDURE — 6370000000 HC RX 637 (ALT 250 FOR IP): Performed by: HOSPITALIST

## 2025-02-04 PROCEDURE — 2500000003 HC RX 250 WO HCPCS: Performed by: HOSPITALIST

## 2025-02-04 PROCEDURE — 82962 GLUCOSE BLOOD TEST: CPT

## 2025-02-04 PROCEDURE — 99239 HOSP IP/OBS DSCHRG MGMT >30: CPT | Performed by: STUDENT IN AN ORGANIZED HEALTH CARE EDUCATION/TRAINING PROGRAM

## 2025-02-04 PROCEDURE — 6370000000 HC RX 637 (ALT 250 FOR IP)

## 2025-02-04 RX ORDER — INSULIN LISPRO 100 [IU]/ML
8 INJECTION, SOLUTION INTRAVENOUS; SUBCUTANEOUS
Status: DISCONTINUED | OUTPATIENT
Start: 2025-02-04 | End: 2025-02-04 | Stop reason: HOSPADM

## 2025-02-04 RX ORDER — INSULIN GLARGINE 100 [IU]/ML
20 INJECTION, SOLUTION SUBCUTANEOUS NIGHTLY
Status: DISCONTINUED | OUTPATIENT
Start: 2025-02-04 | End: 2025-02-04 | Stop reason: HOSPADM

## 2025-02-04 RX ORDER — INSULIN LISPRO 100 [IU]/ML
INJECTION, SOLUTION INTRAVENOUS; SUBCUTANEOUS
Qty: 5 ADJUSTABLE DOSE PRE-FILLED PEN SYRINGE | Refills: 4 | Status: SHIPPED | OUTPATIENT
Start: 2025-02-04

## 2025-02-04 RX ORDER — LANCETS
EACH MISCELLANEOUS
Qty: 250 EACH | Refills: 5 | Status: SHIPPED | OUTPATIENT
Start: 2025-02-04

## 2025-02-04 RX ORDER — INSULIN GLARGINE 100 [IU]/ML
20 INJECTION, SOLUTION SUBCUTANEOUS NIGHTLY
Qty: 5 ADJUSTABLE DOSE PRE-FILLED PEN SYRINGE | Refills: 3 | Status: SHIPPED | OUTPATIENT
Start: 2025-02-04

## 2025-02-04 RX ORDER — GLUCOSAMINE HCL/CHONDROITIN SU 500-400 MG
CAPSULE ORAL
Qty: 250 STRIP | Refills: 5 | Status: SHIPPED | OUTPATIENT
Start: 2025-02-04

## 2025-02-04 RX ORDER — PEN NEEDLE, DIABETIC 32 GX 1/4"
NEEDLE, DISPOSABLE MISCELLANEOUS
Qty: 250 EACH | Refills: 5 | Status: SHIPPED | OUTPATIENT
Start: 2025-02-04

## 2025-02-04 RX ADMIN — ATORVASTATIN CALCIUM 10 MG: 10 TABLET, FILM COATED ORAL at 08:50

## 2025-02-04 RX ADMIN — INSULIN LISPRO 2 UNITS: 100 INJECTION, SOLUTION INTRAVENOUS; SUBCUTANEOUS at 08:51

## 2025-02-04 RX ADMIN — ENOXAPARIN SODIUM 40 MG: 100 INJECTION SUBCUTANEOUS at 08:50

## 2025-02-04 RX ADMIN — METOPROLOL SUCCINATE 100 MG: 100 TABLET, FILM COATED, EXTENDED RELEASE ORAL at 08:50

## 2025-02-04 RX ADMIN — ALLOPURINOL 100 MG: 100 TABLET ORAL at 08:50

## 2025-02-04 RX ADMIN — SODIUM CHLORIDE, PRESERVATIVE FREE 10 ML: 5 INJECTION INTRAVENOUS at 08:50

## 2025-02-04 RX ADMIN — INSULIN LISPRO 8 UNITS: 100 INJECTION, SOLUTION INTRAVENOUS; SUBCUTANEOUS at 16:49

## 2025-02-04 RX ADMIN — INSULIN LISPRO 5 UNITS: 100 INJECTION, SOLUTION INTRAVENOUS; SUBCUTANEOUS at 08:51

## 2025-02-04 RX ADMIN — INSULIN LISPRO 8 UNITS: 100 INJECTION, SOLUTION INTRAVENOUS; SUBCUTANEOUS at 11:47

## 2025-02-04 ASSESSMENT — PAIN SCALES - GENERAL
PAINLEVEL_OUTOF10: 0
PAINLEVEL_OUTOF10: 0

## 2025-02-04 NOTE — PROGRESS NOTES
[facility] Hospitalist Progress Note    Admitting Date and Time: 2/1/2025  4:42 PM  Admit Dx: Hyperglycemia [R73.9]  Elevated lactic acid level [R79.89]    Subjective:  Patient is being followed for Hyperglycemia [R73.9]  Elevated lactic acid level [R79.89]     No acute events overnight.  Patient was admitted for uncontrolled hyperglycemia and his insulin regimen was adjusted.    ROS: denies fever, chills, cp, sob, n/v, HA unless stated above.      allopurinol  100 mg Oral BID    atorvastatin  10 mg Oral Daily    metoprolol succinate  100 mg Oral Daily    sodium chloride flush  5-40 mL IntraVENous 2 times per day    enoxaparin  40 mg SubCUTAneous Daily    insulin lispro  3 Units SubCUTAneous TID WC    insulin lispro  0-6 Units SubCUTAneous 4x Daily AC & HS    insulin glargine  9 Units SubCUTAneous Nightly     glucose, 4 tablet, PRN  dextrose bolus, 125 mL, PRN   Or  dextrose bolus, 250 mL, PRN  glucagon (rDNA), 1 mg, PRN  dextrose, , Continuous PRN  albuterol, 2.5 mg, Q4H PRN  sodium chloride flush, 5-40 mL, PRN  sodium chloride, , PRN  potassium chloride, 40 mEq, PRN   Or  potassium alternative oral replacement, 40 mEq, PRN   Or  potassium chloride, 10 mEq, PRN  magnesium sulfate, 2,000 mg, PRN  ondansetron, 4 mg, Q8H PRN   Or  ondansetron, 4 mg, Q6H PRN  melatonin, 3 mg, Nightly PRN  senna, 1 tablet, Daily PRN  aluminum & magnesium hydroxide-simethicone, 30 mL, Q6H PRN  acetaminophen, 650 mg, Q6H PRN   Or  acetaminophen, 650 mg, Q6H PRN         Objective:    BP (!) 160/94   Pulse 62   Temp 97.7 °F (36.5 °C) (Oral)   Resp 16   Ht 1.6 m (5' 3\")   Wt 78.6 kg (173 lb 4.5 oz)   SpO2 96%   BMI 30.70 kg/m²     General Appearance: Elderly male, alert and oriented to person, place and time and in no acute distress  Skin: warm and dry  Head: normocephalic and atraumatic  Eyes: pupils equal, round, and reactive to light, extraocular eye movements intact, conjunctivae normal  Neck: neck supple and non tender 
       [facility] Hospitalist Progress Note    Admitting Date and Time: 2/1/2025  4:42 PM  Admit Dx: Hyperglycemia [R73.9]  Elevated lactic acid level [R79.89]    Subjective:  Patient is being followed for Hyperglycemia [R73.9]  Elevated lactic acid level [R79.89]     No acute events overnight.  Patient was admitted for uncontrolled hyperglycemia and his insulin regimen was adjusted.    ROS: denies fever, chills, cp, sob, n/v, HA unless stated above.      insulin lispro  5 Units SubCUTAneous TID WC    insulin lispro  0-8 Units SubCUTAneous 4x Daily AC & HS    insulin glargine  16 Units SubCUTAneous Nightly    allopurinol  100 mg Oral BID    atorvastatin  10 mg Oral Daily    metoprolol succinate  100 mg Oral Daily    sodium chloride flush  5-40 mL IntraVENous 2 times per day    enoxaparin  40 mg SubCUTAneous Daily     glucose, 4 tablet, PRN  dextrose bolus, 125 mL, PRN   Or  dextrose bolus, 250 mL, PRN  glucagon (rDNA), 1 mg, PRN  dextrose, , Continuous PRN  albuterol, 2.5 mg, Q4H PRN  sodium chloride flush, 5-40 mL, PRN  sodium chloride, , PRN  potassium chloride, 40 mEq, PRN   Or  potassium alternative oral replacement, 40 mEq, PRN   Or  potassium chloride, 10 mEq, PRN  magnesium sulfate, 2,000 mg, PRN  ondansetron, 4 mg, Q8H PRN   Or  ondansetron, 4 mg, Q6H PRN  melatonin, 3 mg, Nightly PRN  senna, 1 tablet, Daily PRN  aluminum & magnesium hydroxide-simethicone, 30 mL, Q6H PRN  acetaminophen, 650 mg, Q6H PRN   Or  acetaminophen, 650 mg, Q6H PRN         Objective:    BP (!) 153/93   Pulse 65   Temp 97.7 °F (36.5 °C) (Oral)   Resp 16   Ht 1.6 m (5' 3\")   Wt 78.6 kg (173 lb 4.5 oz)   SpO2 100%   BMI 30.70 kg/m²     General Appearance: Elderly male, alert and oriented to person, place and time and in no acute distress  Skin: warm and dry  Head: normocephalic and atraumatic  Eyes: pupils equal, round, and reactive to light, extraocular eye movements intact, conjunctivae normal  Neck: neck supple and non tender 
4 Eyes Skin Assessment     NAME:  Celestino Whelan  YOB: 1960  MEDICAL RECORD NUMBER:  [id number]    The patient is being assessed for  Admission    I agree that at least one RN has performed a thorough Head to Toe Skin Assessment on the patient. ALL assessment sites listed below have been assessed.      Areas assessed by both nurses:    Head, Face, Ears, Shoulders, Back, Chest, Arms, Elbows, Hands, and Legs. Feet and Heels        Does the Patient have a Wound? No noted wound(s)       Yoseph Prevention initiated by RN: No  Wound Care Orders initiated by RN: No    Pressure Injury (Stage 3,4, Unstageable, DTI, NWPT, and Complex wounds) if present, place Wound referral order by RN under : No    New Ostomies, if present place, Ostomy referral order under : No     Nurse 1 eSignature: Electronically signed by EDGARD FELIPE RN on 2/2/25 at 4:37 AM EST    **SHARE this note so that the co-signing nurse can place an eSignature**    Nurse 2 eSignature: Electronically signed by Yaz Dwyer RN on 2/2/25 at 4:38 AM EST  
ENDOCRINOLOGY PROGRESS NOTE    Date of Service: 2/3/2025  Date of Admission: 2/1/2025  Admitting Physician: Frederick Jean DO   Primary Care Physician: Kary Alcantar MD  Consultant physician: Ryan Velásquez MD     Reason for the consultation:  Uncontrolled DM    History of Present Illness:  The history is provided by the patient. Accuracy of the patient data is excellent.    Celestino Whelan is a very pleasant 64 y.o. old male with PMH of type 2 diabetes, hypertension, hyperlipidemia, gout and other listed below admitted to [facility] on 2/1/2025 because of upper abdominal pain and hyperglycemia, endocrine service was consulted for diabetes management       Subjective:  Patient seen and examined with family at bedside.  He reports improvement of symptoms, is able to eat and drink appropriately.      Inpatient diet:   Carb Restricted diet     Point of care glucose monitoring:   Independently reviewed   No results for input(s): \"GLUMET\" in the last 72 hours.    Scheduled Meds:   allopurinol  100 mg Oral BID    atorvastatin  10 mg Oral Daily    metoprolol succinate  100 mg Oral Daily    sodium chloride flush  5-40 mL IntraVENous 2 times per day    enoxaparin  40 mg SubCUTAneous Daily    insulin lispro  3 Units SubCUTAneous TID WC    insulin lispro  0-6 Units SubCUTAneous 4x Daily AC & HS    insulin glargine  9 Units SubCUTAneous Nightly     PRN Meds:   glucose, 4 tablet, PRN  dextrose bolus, 125 mL, PRN   Or  dextrose bolus, 250 mL, PRN  glucagon (rDNA), 1 mg, PRN  dextrose, , Continuous PRN  albuterol, 2.5 mg, Q4H PRN  sodium chloride flush, 5-40 mL, PRN  sodium chloride, , PRN  potassium chloride, 40 mEq, PRN   Or  potassium alternative oral replacement, 40 mEq, PRN   Or  potassium chloride, 10 mEq, PRN  magnesium sulfate, 2,000 mg, PRN  ondansetron, 4 mg, Q8H PRN   Or  ondansetron, 4 mg, Q6H PRN  melatonin, 3 mg, Nightly PRN  senna, 1 tablet, Daily PRN  aluminum & magnesium hydroxide-simethicone, 30 mL, Q6H 
Perfect served Dr. Velásquez for new consult.   
Reason for consult:  Uncontrolled type 2 DM    A1C: 12.3%            Patient states the following concerns/barriers to diabetes self-management:     [x] None       [] Medication cost   [] Food cost/availability        [] Reading  [] Hearing   [] Vision                [] Work    [] Transportation  [] No insurance  [] Physical limitations    [] Other:        Patient states the following about their diabetes/health:   [x]  He was never told he had diabetes in the past, but was taking metformin 500 mg daily. This was increased to BID 3 weeks ago.    [x]  He has been feeling symptomatic of hyperglycemia the past few weeks, experiencing polyuria, polydipsia, and tiredness.   [x]  He eats \"all day long, up to 10 times per day.\" He enjoys carbohydrate foods, such as rice, beans, and fruits. He is not mindful of what he eats/drinks. He was drinking large amounts of juice and milk prior to admission.    [x]   He is active as a  in the summer months and tries to stay active around the house the rest of the year. He does not engage in planned aerobic exercise.   [x]  He does not own a glucose meter or testing supplies, as this was never prescribed for him.         Diabetes survival packet provided to:   [x] Patient     [x] Family member     Information given:   Definition of diabetes   Target glucose ranges/A1C   Self-monitoring of blood glucose   Prevention/symptoms/treatment of hypo-/hyperglycemia   Medication adherence             The plate method/meal planning guidelines             The benefits of exercise and recommendations             Reducing the risk of chronic complications     Diabetes medications reviewed (use, purpose, action): Education provided regarding current basal/bolus regimen of Lantus and Humalog including differences in types of insulin, timing with meals, onset, duration of effect and peak of insulin dose. Hypoglycemia signs, symptoms and treatments reviewed and literature provided. Patient 
PRN  acetaminophen, 650 mg, Q6H PRN   Or  acetaminophen, 650 mg, Q6H PRN      Continuous Infusions:   dextrose      sodium chloride         Review of Systems  All systems reviewed. All negative except for symptoms mentioned in HPI     OBJECTIVE    BP (!) 153/93   Pulse 65   Temp 97.7 °F (36.5 °C) (Oral)   Resp 16   Ht 1.6 m (5' 3\")   Wt 78.6 kg (173 lb 4.5 oz)   SpO2 100%   BMI 30.70 kg/m²     Intake/Output Summary (Last 24 hours) at 2/4/2025 1502  Last data filed at 2/4/2025 0850  Gross per 24 hour   Intake 5 ml   Output --   Net 5 ml       Physical examination:  General: awake alert, oriented x3, no abnormal position or movements.  HEENT: normocephalic non-traumatic, no exophthalmos   Neck: supple, no LN enlargement, no thyromegaly, no thyroid tenderness, no JVD.  Pulm: Clear equal air entry no added sounds, no wheezing or rhonchi    CVS: S1 + S2, no murmur, no heave  Abd: soft lax, no tenderness, no organomegaly, audible bowel sounds.   Skin: warm, no lesions, no rash.    Feet: sensory exam of the feet is present, tested with the monofilament. No ulcers or open wounds. Good peripheral pulses  Neuro: CN intact, muscle power normal  Psych: normal mood, and affect    Review of Laboratory Data:  I have reviewed the following:  Recent Labs     02/01/25 1714 02/02/25  0230 02/03/25  0340   WBC 12.3* 10.6 7.8   RBC 5.26 4.93 4.77   HGB 14.9 14.5 14.0   HCT 44.5 42.0 41.8   MCV 84.6 85.2 87.6   MCH 28.3 29.4 29.4   MCHC 33.5 34.5 33.5   RDW 11.8 12.1 11.9    249 202   MPV 11.3 10.9 10.6     Recent Labs     02/01/25  1715 02/01/25  2145 02/02/25  0230 02/03/25  0340   * 133 139 137   K 5.2* 4.3 4.0 3.9   CL 90* 97* 104 105   CO2 22 23 22 23   BUN 23 20 19 13   CREATININE 1.1 0.8 0.7 0.6*   GLUCOSE 666* 412* 195* 237*   CALCIUM 9.6 8.6 8.9 8.6   BILITOT 0.6  --   --  0.7   ALKPHOS 97  --   --  68   AST 18  --   --  24   ALT 24  --   --  19     No results found for: \"BHYDRXBUT\"  Lab Results   Component

## 2025-02-04 NOTE — DISCHARGE SUMMARY
[facility] Hospitalist Physician Discharge Summary       No follow-up provider specified.    Activity level: As tolerated     Dispo: home    Condition on discharge: Stable     Patient ID:  Celestino Whelan  [id number]  64 y.o.  1960    Admit date: 2/1/2025    Discharge date and time:  2/4/2025  6:58 PM    Admission Diagnoses: Principal Problem:    Type 2 diabetes mellitus with hyperglycemia (HCC)  Active Problems:    Hyperlipidemia    Poorly controlled type 2 diabetes mellitus (HCC)    Gout    Moderate persistent asthma    Obstructive sleep apnea syndrome    Resistant hypertension    Osteoarthritis    Class 1 obesity due to excess calories with body mass index (BMI) of 31.0 to 31.9 in adult    Hyperglycemia    Dietary noncompliance  Resolved Problems:    * No resolved hospital problems. *      Discharge Diagnoses: Principal Problem:    Type 2 diabetes mellitus with hyperglycemia (HCC)  Active Problems:    Hyperlipidemia    Poorly controlled type 2 diabetes mellitus (HCC)    Gout    Moderate persistent asthma    Obstructive sleep apnea syndrome    Resistant hypertension    Osteoarthritis    Class 1 obesity due to excess calories with body mass index (BMI) of 31.0 to 31.9 in adult    Hyperglycemia    Dietary noncompliance  Resolved Problems:    * No resolved hospital problems. *      Consults:  IP CONSULT TO ENDOCRINOLOGY  IP CONSULT TO DIABETES EDUCATOR    Procedures: none    Hospital Course:   Patient Celestino Whelan is a 64 y.o. presented with Hyperglycemia [R73.9]  Elevated lactic acid level [R79.89]   A 64 years old male who was admitted for evaluation of uncontrolled diabetes mellitus with hyperglycemia. Patient was evaluated by endocrinology and he was started on insulin regimen with improvement of his hyperglycemia. Today, patient is medically stable for discharge home and he needs to follow up with endocrinology as outpatient upon discharge.    Discharge Exam:    General

## 2025-02-04 NOTE — PLAN OF CARE
Problem: Chronic Conditions and Co-morbidities  Goal: Patient's chronic conditions and co-morbidity symptoms are monitored and maintained or improved  Outcome: Progressing  Flowsheets (Taken 2/3/2025 0820 by Garfield Walsh, RN)  Care Plan - Patient's Chronic Conditions and Co-Morbidity Symptoms are Monitored and Maintained or Improved: Monitor and assess patient's chronic conditions and comorbid symptoms for stability, deterioration, or improvement     Problem: Discharge Planning  Goal: Discharge to home or other facility with appropriate resources  Outcome: Progressing  Flowsheets (Taken 2/3/2025 0820 by Garfield Walsh, RN)  Discharge to home or other facility with appropriate resources: Identify barriers to discharge with patient and caregiver     Problem: Pain  Goal: Verbalizes/displays adequate comfort level or baseline comfort level  Outcome: Progressing

## 2025-02-04 NOTE — PLAN OF CARE
Problem: Chronic Conditions and Co-morbidities  Goal: Patient's chronic conditions and co-morbidity symptoms are monitored and maintained or improved  2/4/2025 1859 by Susy Bowman RN  Outcome: Completed  2/4/2025 1222 by Susy Bowman RN  Outcome: Progressing     Problem: Discharge Planning  Goal: Discharge to home or other facility with appropriate resources  2/4/2025 1859 by Susy Bowman RN  Outcome: Completed  2/4/2025 1222 by Susy Bowman RN  Outcome: Progressing     Problem: Pain  Goal: Verbalizes/displays adequate comfort level or baseline comfort level  2/4/2025 1859 by Susy Bowman RN  Outcome: Completed  2/4/2025 1222 by Susy Bowman RN  Outcome: Progressing

## 2025-02-05 NOTE — FLOWSHEET NOTE
02/04/25 1923   AVS Reviewed   AVS & discharge instructions reviewed with patient and/or representative? Yes   Reviewed instructions with Patient;Other (name and relationship in comment)  (Mika - son-in law)   Level of Understanding Questions answered;Teach back completed;Verbalized understanding     Discharge instructions given to patient family at length .Medication administration, follow up appointments and special instructions discussed. Patient thankful for the care received.

## 2025-03-11 ENCOUNTER — OFFICE VISIT (OUTPATIENT)
Dept: FAMILY MEDICINE CLINIC | Age: 65
End: 2025-03-11
Payer: MEDICAID

## 2025-03-11 VITALS
TEMPERATURE: 99 F | HEART RATE: 63 BPM | HEIGHT: 63 IN | SYSTOLIC BLOOD PRESSURE: 134 MMHG | BODY MASS INDEX: 31.36 KG/M2 | WEIGHT: 177 LBS | DIASTOLIC BLOOD PRESSURE: 81 MMHG | OXYGEN SATURATION: 95 % | RESPIRATION RATE: 17 BRPM

## 2025-03-11 DIAGNOSIS — I10 HYPERTENSION, UNSPECIFIED TYPE: ICD-10-CM

## 2025-03-11 DIAGNOSIS — Z76.0 MEDICATION REFILL: ICD-10-CM

## 2025-03-11 DIAGNOSIS — J45.40 MODERATE PERSISTENT ASTHMA WITHOUT COMPLICATION: ICD-10-CM

## 2025-03-11 DIAGNOSIS — Z79.4 TYPE 2 DIABETES MELLITUS WITHOUT COMPLICATION, WITH LONG-TERM CURRENT USE OF INSULIN (HCC): Primary | ICD-10-CM

## 2025-03-11 DIAGNOSIS — E11.9 TYPE 2 DIABETES MELLITUS WITHOUT COMPLICATION, WITH LONG-TERM CURRENT USE OF INSULIN (HCC): Primary | ICD-10-CM

## 2025-03-11 PROCEDURE — 1036F TOBACCO NON-USER: CPT

## 2025-03-11 PROCEDURE — G8428 CUR MEDS NOT DOCUMENT: HCPCS

## 2025-03-11 PROCEDURE — 99213 OFFICE O/P EST LOW 20 MIN: CPT

## 2025-03-11 PROCEDURE — 2022F DILAT RTA XM EVC RTNOPTHY: CPT

## 2025-03-11 PROCEDURE — 3017F COLORECTAL CA SCREEN DOC REV: CPT

## 2025-03-11 PROCEDURE — G8417 CALC BMI ABV UP PARAM F/U: HCPCS

## 2025-03-11 PROCEDURE — 3079F DIAST BP 80-89 MM HG: CPT

## 2025-03-11 PROCEDURE — 3075F SYST BP GE 130 - 139MM HG: CPT

## 2025-03-11 PROCEDURE — 3046F HEMOGLOBIN A1C LEVEL >9.0%: CPT

## 2025-03-11 RX ORDER — ALBUTEROL SULFATE 0.83 MG/ML
2.5 SOLUTION RESPIRATORY (INHALATION) EVERY 4 HOURS PRN
Qty: 75 ML | Refills: 1 | Status: SHIPPED | OUTPATIENT
Start: 2025-03-11 | End: 2025-06-09

## 2025-03-11 RX ORDER — CARVEDILOL 25 MG/1
TABLET ORAL
Qty: 60 TABLET | Refills: 3 | Status: SHIPPED | OUTPATIENT
Start: 2025-03-11

## 2025-03-11 RX ORDER — LISINOPRIL 40 MG/1
40 TABLET ORAL DAILY
Qty: 30 TABLET | Refills: 2 | Status: SHIPPED | OUTPATIENT
Start: 2025-03-11 | End: 2025-06-09

## 2025-03-11 RX ORDER — DILTIAZEM HYDROCHLORIDE 60 MG/1
2 TABLET, FILM COATED ORAL 2 TIMES DAILY
Qty: 10.2 G | Refills: 3 | Status: SHIPPED | OUTPATIENT
Start: 2025-03-11

## 2025-03-11 ASSESSMENT — ENCOUNTER SYMPTOMS
WHEEZING: 0
COUGH: 0
NAUSEA: 0
VOMITING: 0
STRIDOR: 0
BLOOD IN STOOL: 0
CONSTIPATION: 0
DIARRHEA: 0

## 2025-03-11 NOTE — PROGRESS NOTES
The patient is a 64 year old male presenting today for follow-up on chronic conditions: Hypertension, diabetes, and hyperlipidemia.    Hypertension  Patient is prescribed to be taking hydrochlorothiazide and Toprol XL.Patient is taking hydrochlorothiazide as prescribed however patient stopped taking Toprol-XL due to side effects.  Patient started to take again previous hypertensive medication: Lisinopril and Carvedilol.  Patient does not know what are the dose.  Medication were previously prescribed in his chart carvedilol was started on 6.25 mg then increased to 12.5 mg and then to 25 mg     Lisinopril was started on 40 mg.  Patient is checking blood pressure at home patient has been trying to be mindful of what he is eating and improve his diet. Blood pressure today is 134/81.    Diabetes  Patient used to be on metformin 500 mg daily.  Patient was admitted for abdominal pain and excessive sweating 2 weeks ago during the admission endocrinology was consulted and switch his regimen to insulin.  Currently patient is taking insulin lispro 8 units 3 times daily and insulin glargine once daily 20 units.  Denied any side effects or hypoglycemia events.  Patient is checking blood glucose at home values are between .    Hyperlipidemia  Patient is taking Lipitor as prescribed.    Asthma  Patient requesting refill on his inhaler.    Blood pressure 134/81, pulse 63, temperature 99 °F (37.2 °C), temperature source Temporal, resp. rate 17, height 1.6 m (5' 3\"), weight 80.3 kg (177 lb), SpO2 95%.    HEENT WNL     Heart regular    Lungs clear    abd non-tender      No edema    Pulses intact       Patient Active Problem List   Diagnosis    Gout    Moderate persistent asthma    Obstructive sleep apnea syndrome    Resistant hypertension    Phimosis    Need for shingles vaccine    Hyperlipidemia    Poorly controlled type 2 diabetes mellitus (HCC)    Osteoarthritis    Tinea pedis of both feet    Class 1 obesity due to excess 
  Psychiatric:         Mood and Affect: Mood normal.         Behavior: Behavior normal.         Thought Content: Thought content normal.         Judgment: Judgment normal.       ______________________________________________________________________    Assessment & Plan:  1. Type 2 diabetes mellitus without complication, with long-term current use of insulin (HCC)  - Follow-up as scheduled with Dr. Velásquez  - Discussed diet modification and encouraged regular aerobic and resistance exercise.    2. Hypertension, unspecified type  - Stop taking Toprol XL  - Restart taking: carvedilol (COREG) 25 MG tablet; TAKE 1 TABLET BY MOUTH TWICE DAILY  Dispense: 60 tablet; Refill: 3  - Restart taking: lisinopril (PRINIVIL;ZESTRIL) 40 MG tablet; Take 1 tablet by mouth daily  Dispense: 30 tablet; Refill: 2    3. Moderate persistent asthma without complication  - albuterol (PROVENTIL) (2.5 MG/3ML) 0.083% nebulizer solution; Take 3 mLs by nebulization every 4 hours as needed for Wheezing or Shortness of Breath  Dispense: 75 mL; Refill: 1  - SYMBICORT 80-4.5 MCG/ACT AERO; Inhale 2 puffs into the lungs 2 times daily  Dispense: 10.2 g; Refill: 3    4. Medication refill  - albuterol (PROVENTIL) (2.5 MG/3ML) 0.083% nebulizer solution; Take 3 mLs by nebulization every 4 hours as needed for Wheezing or Shortness of Breath  Dispense: 75 mL; Refill: 1  - SYMBICORT 80-4.5 MCG/ACT AERO; Inhale 2 puffs into the lungs 2 times daily  Dispense: 10.2 g; Refill: 3    Healthcare Management:   - Indications and proper use of medications reviewed. Potential side-effects and risks of medications also explained.   - Medication refills granted.       Patient verbalizes understanding and agrees with above counseling, assessment, and plan. All questions were answered.       Return to Office: Return in 6 months (on 9/11/2025) for Follow-up on HTN, HLD, and DM.      Kary Alcantar MD PGY-1  This patient was discussed with Dr. Morris

## 2025-03-13 ENCOUNTER — OFFICE VISIT (OUTPATIENT)
Dept: ENDOCRINOLOGY | Age: 65
End: 2025-03-13
Payer: MEDICAID

## 2025-03-13 VITALS
WEIGHT: 179 LBS | BODY MASS INDEX: 31.71 KG/M2 | OXYGEN SATURATION: 98 % | SYSTOLIC BLOOD PRESSURE: 143 MMHG | HEIGHT: 63 IN | RESPIRATION RATE: 18 BRPM | DIASTOLIC BLOOD PRESSURE: 77 MMHG | HEART RATE: 60 BPM

## 2025-03-13 DIAGNOSIS — Z91.119 DIETARY NONCOMPLIANCE: ICD-10-CM

## 2025-03-13 DIAGNOSIS — E11.65 TYPE 2 DIABETES MELLITUS WITH HYPERGLYCEMIA, UNSPECIFIED WHETHER LONG TERM INSULIN USE (HCC): Primary | ICD-10-CM

## 2025-03-13 DIAGNOSIS — E78.2 MIXED HYPERLIPIDEMIA: ICD-10-CM

## 2025-03-13 PROCEDURE — 3046F HEMOGLOBIN A1C LEVEL >9.0%: CPT | Performed by: INTERNAL MEDICINE

## 2025-03-13 PROCEDURE — G2211 COMPLEX E/M VISIT ADD ON: HCPCS | Performed by: INTERNAL MEDICINE

## 2025-03-13 PROCEDURE — 1036F TOBACCO NON-USER: CPT | Performed by: INTERNAL MEDICINE

## 2025-03-13 PROCEDURE — G8427 DOCREV CUR MEDS BY ELIG CLIN: HCPCS | Performed by: INTERNAL MEDICINE

## 2025-03-13 PROCEDURE — 99214 OFFICE O/P EST MOD 30 MIN: CPT | Performed by: INTERNAL MEDICINE

## 2025-03-13 PROCEDURE — 3078F DIAST BP <80 MM HG: CPT | Performed by: INTERNAL MEDICINE

## 2025-03-13 PROCEDURE — 3077F SYST BP >= 140 MM HG: CPT | Performed by: INTERNAL MEDICINE

## 2025-03-13 PROCEDURE — 2022F DILAT RTA XM EVC RTNOPTHY: CPT | Performed by: INTERNAL MEDICINE

## 2025-03-13 PROCEDURE — 3017F COLORECTAL CA SCREEN DOC REV: CPT | Performed by: INTERNAL MEDICINE

## 2025-03-13 PROCEDURE — G8417 CALC BMI ABV UP PARAM F/U: HCPCS | Performed by: INTERNAL MEDICINE

## 2025-03-13 RX ORDER — METFORMIN HYDROCHLORIDE 500 MG/1
500 TABLET, EXTENDED RELEASE ORAL
Qty: 60 TABLET | Refills: 5 | Status: SHIPPED | OUTPATIENT
Start: 2025-03-13

## 2025-03-13 NOTE — PROGRESS NOTES
[facility]  [address], [address]  [facility] [address]  Dept: [phone]  Loc: [phone]        Date of service: 3/13/2025  Primary Care Physician: Kary Alcantar MD  provider: Ryan Velásquez MD     Reason for the visit:  Uncontrolled DM    History of Present Illness:  The history is provided by the patient. Accuracy of the patient data is excellent    Celestino Whelan is a very pleasant 64 y.o. old male with PMH of type 2 diabetes, hypertension, hyperlipidemia, gout and other listed below seen today for  diabetes management.    The patient was diagnosed with type 2 diabetes few years ago.  He was recently admitted to the hospital and discharged home on insulin, currently on Lantus 20 daily, Humalog 8u with meals plus MDSS.  The patient has been more compliance with following diabetic diet since recent hospitalization.   .  The patient denies any microvascular or macrovascular diabetes complications.  He is overdue with diabetic eye exam.  Lab Results   Component Value Date/Time    LABA1C 12.3 02/01/2025 05:14 PM     Past medical history:   Past Medical History:   Diagnosis Date    Asthma     Chronic back pain     COPD (chronic obstructive pulmonary disease) (HCC)     Depression     Gout     Hypertension     Obesity (BMI 30.0-34.9)     Osteoarthritis     Right knee    Tinea pedis of both feet        Past surgical history:  Past Surgical History:   Procedure Laterality Date    CIRCUMCISION      COLONOSCOPY  2000    [place] for BRBPR    COLONOSCOPY  04/13/2017       Social history:   Tobacco:   reports that he has never smoked. He has never used smokeless tobacco.  Alcohol:   reports no history of alcohol use.  Drugs:   reports no history of drug use.    Family history:    Family History   Problem Relation Age of Onset    Arthritis Mother     Diabetes Mother     High Blood Pressure Mother     Arthritis Father     High Blood Pressure Father

## 2025-04-01 ENCOUNTER — TELEPHONE (OUTPATIENT)
Dept: ENDOCRINOLOGY | Age: 65
End: 2025-04-01

## 2025-04-29 ENCOUNTER — TELEPHONE (OUTPATIENT)
Dept: FAMILY MEDICINE CLINIC | Age: 65
End: 2025-04-29

## 2025-04-30 DIAGNOSIS — J45.40 MODERATE PERSISTENT ASTHMA WITHOUT COMPLICATION: ICD-10-CM

## 2025-04-30 DIAGNOSIS — Z76.0 MEDICATION REFILL: ICD-10-CM

## 2025-04-30 RX ORDER — ALBUTEROL SULFATE 0.83 MG/ML
2.5 SOLUTION RESPIRATORY (INHALATION) EVERY 4 HOURS PRN
Qty: 100 ML | Refills: 2 | Status: SHIPPED | OUTPATIENT
Start: 2025-04-30 | End: 2025-07-29

## 2025-04-30 NOTE — TELEPHONE ENCOUNTER
Spoke with patient. He stated that he is only using as needed. Informed if he is using several times a day, he needs to schedule appointment to discuss management. Patient understood.

## 2025-05-30 DIAGNOSIS — E11.65 TYPE 2 DIABETES MELLITUS WITH HYPERGLYCEMIA, UNSPECIFIED WHETHER LONG TERM INSULIN USE (HCC): ICD-10-CM

## 2025-07-01 RX ORDER — METFORMIN HYDROCHLORIDE 500 MG/1
500 TABLET, EXTENDED RELEASE ORAL
Qty: 60 TABLET | Refills: 5 | Status: SHIPPED | OUTPATIENT
Start: 2025-07-01

## 2025-07-06 DIAGNOSIS — I10 HYPERTENSION, UNSPECIFIED TYPE: ICD-10-CM

## 2025-07-07 RX ORDER — LISINOPRIL 40 MG/1
40 TABLET ORAL DAILY
Qty: 30 TABLET | Refills: 2 | Status: SHIPPED | OUTPATIENT
Start: 2025-07-07 | End: 2025-10-05

## 2025-07-07 NOTE — TELEPHONE ENCOUNTER
Name of Medication(s) Requested:  Requested Prescriptions     Pending Prescriptions Disp Refills    lisinopril (PRINIVIL;ZESTRIL) 40 MG tablet [Pharmacy Med Name: LISINOPRIL 40MG TABLETS] 30 tablet 2     Sig: TAKE 1 TABLET BY MOUTH DAILY       Medication is on current medication list Yes    Dosage and directions were verified? Yes    Quantity verified: 30 day supply     Pharmacy Verified?  Yes    Last Appointment:  3/11/2025    Future appts:  Future Appointments   Date Time Provider Department Center   8/7/2025 10:45 AM Ryan Velásquez MD [facility]        (If no appt send self scheduling link. .REFILLAPPT)  Scheduling request sent?     [] Yes  [x] No    Does patient need updated?  [] Yes  [x] No

## 2025-08-07 ENCOUNTER — OFFICE VISIT (OUTPATIENT)
Dept: ENDOCRINOLOGY | Age: 65
End: 2025-08-07
Payer: MEDICAID

## 2025-08-07 VITALS
BODY MASS INDEX: 29.59 KG/M2 | HEART RATE: 61 BPM | HEIGHT: 63 IN | OXYGEN SATURATION: 98 % | WEIGHT: 167 LBS | DIASTOLIC BLOOD PRESSURE: 77 MMHG | SYSTOLIC BLOOD PRESSURE: 167 MMHG

## 2025-08-07 DIAGNOSIS — Z91.119 DIETARY NONCOMPLIANCE: ICD-10-CM

## 2025-08-07 DIAGNOSIS — E11.65 TYPE 2 DIABETES MELLITUS WITH HYPERGLYCEMIA, UNSPECIFIED WHETHER LONG TERM INSULIN USE (HCC): Primary | ICD-10-CM

## 2025-08-07 DIAGNOSIS — E78.2 MIXED HYPERLIPIDEMIA: ICD-10-CM

## 2025-08-07 LAB — HBA1C MFR BLD: 6 %

## 2025-08-07 PROCEDURE — G8427 DOCREV CUR MEDS BY ELIG CLIN: HCPCS | Performed by: INTERNAL MEDICINE

## 2025-08-07 PROCEDURE — 2022F DILAT RTA XM EVC RTNOPTHY: CPT | Performed by: INTERNAL MEDICINE

## 2025-08-07 PROCEDURE — G8417 CALC BMI ABV UP PARAM F/U: HCPCS | Performed by: INTERNAL MEDICINE

## 2025-08-07 PROCEDURE — 3078F DIAST BP <80 MM HG: CPT | Performed by: INTERNAL MEDICINE

## 2025-08-07 PROCEDURE — 3017F COLORECTAL CA SCREEN DOC REV: CPT | Performed by: INTERNAL MEDICINE

## 2025-08-07 PROCEDURE — 83036 HEMOGLOBIN GLYCOSYLATED A1C: CPT | Performed by: INTERNAL MEDICINE

## 2025-08-07 PROCEDURE — 1036F TOBACCO NON-USER: CPT | Performed by: INTERNAL MEDICINE

## 2025-08-07 PROCEDURE — G2211 COMPLEX E/M VISIT ADD ON: HCPCS | Performed by: INTERNAL MEDICINE

## 2025-08-07 PROCEDURE — 3044F HG A1C LEVEL LT 7.0%: CPT | Performed by: INTERNAL MEDICINE

## 2025-08-07 PROCEDURE — 3077F SYST BP >= 140 MM HG: CPT | Performed by: INTERNAL MEDICINE

## 2025-08-07 PROCEDURE — 99214 OFFICE O/P EST MOD 30 MIN: CPT | Performed by: INTERNAL MEDICINE

## 2025-08-07 RX ORDER — METFORMIN HYDROCHLORIDE 500 MG/1
500 TABLET, EXTENDED RELEASE ORAL 2 TIMES DAILY WITH MEALS
Qty: 60 TABLET | Refills: 5 | Status: SHIPPED | OUTPATIENT
Start: 2025-08-07